# Patient Record
Sex: FEMALE | Race: WHITE | NOT HISPANIC OR LATINO | Employment: OTHER | URBAN - METROPOLITAN AREA
[De-identification: names, ages, dates, MRNs, and addresses within clinical notes are randomized per-mention and may not be internally consistent; named-entity substitution may affect disease eponyms.]

---

## 2017-05-03 ENCOUNTER — GENERIC CONVERSION - ENCOUNTER (OUTPATIENT)
Dept: OTHER | Facility: OTHER | Age: 62
End: 2017-05-03

## 2017-10-18 ENCOUNTER — ALLSCRIPTS OFFICE VISIT (OUTPATIENT)
Dept: OTHER | Facility: OTHER | Age: 62
End: 2017-10-18

## 2017-10-18 DIAGNOSIS — Z12.31 ENCOUNTER FOR SCREENING MAMMOGRAM FOR MALIGNANT NEOPLASM OF BREAST: ICD-10-CM

## 2017-10-18 DIAGNOSIS — Z00.00 ENCOUNTER FOR GENERAL ADULT MEDICAL EXAMINATION WITHOUT ABNORMAL FINDINGS: ICD-10-CM

## 2017-10-18 PROCEDURE — G0143 SCR C/V CYTO,THINLAYER,RESCR: HCPCS | Performed by: OBSTETRICS & GYNECOLOGY

## 2017-10-18 PROCEDURE — 88342 IMHCHEM/IMCYTCHM 1ST ANTB: CPT | Performed by: OBSTETRICS & GYNECOLOGY

## 2017-10-18 PROCEDURE — 88341 IMHCHEM/IMCYTCHM EA ADD ANTB: CPT | Performed by: OBSTETRICS & GYNECOLOGY

## 2017-10-18 PROCEDURE — 88305 TISSUE EXAM BY PATHOLOGIST: CPT | Performed by: OBSTETRICS & GYNECOLOGY

## 2017-10-18 PROCEDURE — 87624 HPV HI-RISK TYP POOLED RSLT: CPT | Performed by: OBSTETRICS & GYNECOLOGY

## 2017-10-19 ENCOUNTER — LAB REQUISITION (OUTPATIENT)
Dept: LAB | Facility: HOSPITAL | Age: 62
End: 2017-10-19
Payer: COMMERCIAL

## 2017-10-19 DIAGNOSIS — Z01.419 ENCOUNTER FOR GYNECOLOGICAL EXAMINATION WITHOUT ABNORMAL FINDING: ICD-10-CM

## 2017-10-19 DIAGNOSIS — R93.89 ABNORMAL FINDINGS ON DIAGNOSTIC IMAGING OF OTHER SPECIFIED BODY STRUCTURES: ICD-10-CM

## 2017-10-19 NOTE — PROGRESS NOTES
Plan  Encounter for gynecological examination without abnormal finding    · (1) THIN PREP PAP FOLLOW UP WITH IMAGING; Status: In Progress - Specimen/Data  Collected,Retrospective By Protocol Authorization;   Done: [id number]   Perform:[facility] In Office Collection; DBQ:[id number]; Last Updated By:Nadia Sanchez; 10/18/2017 11:16:34 AM;Ordered;For:Encounter for gynecological examination without abnormal finding; Ordered By:Julio C Robin;  Maturation index required? : No  :   HPV? : Regardless of Interpretation  Health Maintenance    · 1 Tara Brunner MD, [facility]  *  Status: Active -  Retrospective By Protocol Authorization  Requested for: [id number]   Ordered; For: Health Maintenance; Ordered By: Yany Collins Performed:  Due: [id number]; Last Updated By: Sadia Mahoney; 10/18/2017 11:19:26 AM  Care Summary provided  : Yes   · (1) HEP C ANTIBODY; Status:Active - Retrospective By Protocol Authorization; Requested  [facility]:[id number];    Perform:[facility]; Due:92Hov3871; Last Updated By:Nadia Sanchez; 10/18/2017 11:19:50 AM;Ordered;For:Health Maintenance; Ordered By:Octavia Robin;    Discussion/Summary  Discussion Summary: Thickened endometrial stripe with no postmenopausal bleeding  Patient has a colonoscopy pending  4 pass endometrial biopsy was performed  Short interval follow-up  Bilateral screening mammogram  Screening for hepatitis C  Chief Complaint  Chief Complaint Free Text Note Form: 58year old female here for a follow up appointment   History of Present Illness  HPI: Patient is a 59-year-old female with a one month history of left lower quadrant pain  Her primary care doctor suggested she go to the emergency room where CAT scan was performed and there was a finding of a thickened endometrial stripe at 1 3 cm and a 1 4 centimeter ovarian cyst  Taking Osphina  Diagnosis of the left lower quadrant pain was not made   She is scheduled for colonoscopy    She is here for a follow-up on her thickened endometrial stripe  Patient denies postmenopausal bleeding  Past medical history is significant for diabetes  Past surgical history is significant for breast reduction, transvaginal mesh, rotator cuff, and knee surgery  Current medications are Osphina  No known drug allergies  Family history significant for maternal grandmother with breast cancer  Review of Systems   Female ROS: no postmenopausal bleeding  Focused-Female:   Constitutional: not feeling poorly  Breasts: no complaints of breast pain, breast lump or nipple discharge  Gastrointestinal: abdominal pain  Active Problems  1  Encounter for gynecological examination without abnormal finding (V72 31) (Z01 419)   2  Encounter for mammogram to establish baseline mammogram (V76 12) (Z12 31)    Vitals  Vital Signs    Recorded: 86QQJ7057 65:98RF   Systolic 664, LUE, Sitting   Diastolic 82, LUE, Sitting   Height 5 ft 6 in   LMP      Physical Exam    Constitutional   General appearance: No acute distress, well appearing and well nourished  Neck   Neck: Normal, supple, trachea midline, no masses  Thyroid: Normal, no thyromegaly  Pulmonary   Respiratory effort: No increased work of breathing or signs of respiratory distress  Auscultation of lungs: Clear to auscultation  Cardiovascular   Auscultation of heart: Normal rate and rhythm, normal S1 and S2, no murmurs  Peripheral vascular exam: Normal pulses Throughout  Genitourinary   External genitalia: Normal and no lesions appreciated  Vagina: Normal, no lesions or dryness appreciated  Urethra: Normal     Urethral meatus: Normal     Bladder: Normal, soft, non-tender and no prolapse or masses appreciated  Cervix: Normal, no palpable masses  Uterus: Normal, non-tender, not enlarged, and no palpable masses  Adnexa/parametria: Normal, non-tender and no fullness or masses appreciated      Chest   Breasts: Normal and no dimpling or skin changes noted  -- Bilateral breast reduction scars  Abdomen   Abdomen: Normal, non-tender, and no organomegaly noted  Psychiatric   Orientation to person, place, and time: Normal     Mood and affect: Normal        Procedure    Procedure: Endometrial biopsy  Indication: abnormal ultrasound findings  Procedure Note:   The cervix was prepped with betadine  Anesthesia: none  The cervix allowed easy passage of a uterine sound without dilation  The uterus was anteverted-- and-- sounded to 9 cm  A Pipelle endometrial suction curette was inserted into the uterine cavity  Using a combination of suction and rotation, samples were obtained from  A moderate amount of blood and tissue were obtained  the specimen was placed in buffered formalin and sent for pathology  Patient Status: the patient tolerated the procedure well  Complications: there were no complications  The patient was instructed to follow up to review results  Future Appointments    Date/Time Provider Specialty Site   11/02/2017 11:20 AM KRYSTLE Butt   Obstetrics/Gynecology [facility]     Signatures   Electronically signed by : KRYSTLE Guzman ; Oct 18 2017 11:48AM EST                       (Author)

## 2017-10-21 ENCOUNTER — LAB CONVERSION - ENCOUNTER (OUTPATIENT)
Dept: OTHER | Facility: OTHER | Age: 62
End: 2017-10-21

## 2017-10-21 LAB
HEPATITIS C ANTIBODY (HISTORICAL): NORMAL
SIGNAL TO CUT-OFF (HISTORICAL): 0.89

## 2017-10-24 LAB — HPV RRNA GENITAL QL NAA+PROBE: NORMAL

## 2017-10-26 ENCOUNTER — GENERIC CONVERSION - ENCOUNTER (OUTPATIENT)
Dept: OTHER | Facility: OTHER | Age: 62
End: 2017-10-26

## 2017-10-26 PROCEDURE — 88305 TISSUE EXAM BY PATHOLOGIST: CPT | Performed by: OBSTETRICS & GYNECOLOGY

## 2017-10-28 LAB
LAB AP GYN PRIMARY INTERPRETATION: NORMAL
Lab: NORMAL

## 2017-10-30 ENCOUNTER — LAB REQUISITION (OUTPATIENT)
Dept: LAB | Facility: HOSPITAL | Age: 62
End: 2017-10-30
Payer: COMMERCIAL

## 2017-10-30 ENCOUNTER — GENERIC CONVERSION - ENCOUNTER (OUTPATIENT)
Dept: OTHER | Facility: OTHER | Age: 62
End: 2017-10-30

## 2017-10-30 DIAGNOSIS — Z00.00 ENCOUNTER FOR GENERAL ADULT MEDICAL EXAMINATION WITHOUT ABNORMAL FINDINGS: ICD-10-CM

## 2017-11-02 ENCOUNTER — GENERIC CONVERSION - ENCOUNTER (OUTPATIENT)
Dept: OTHER | Facility: OTHER | Age: 62
End: 2017-11-02

## 2017-11-20 RX ORDER — OMEPRAZOLE 20 MG/1
20 CAPSULE, DELAYED RELEASE ORAL DAILY
COMMUNITY

## 2017-11-20 RX ORDER — FENOFIBRATE 145 MG/1
145 TABLET, COATED ORAL DAILY
COMMUNITY

## 2017-11-20 NOTE — PRE-PROCEDURE INSTRUCTIONS
Pre-Surgery Instructions:   Medication Instructions    Diphenhydramine-APAP, sleep, (EXCEDRIN PM PO) Instructed patient per Anesthesia Guidelines   fenofibrate (TRICOR) 145 mg tablet Instructed patient per Anesthesia Guidelines   Multiple Vitamins-Minerals (EYE VITAMINS PO) Instructed patient per Anesthesia Guidelines   omeprazole (PriLOSEC) 20 mg delayed release capsule Instructed patient per Anesthesia Guidelines   Ospemifene (OSPHENA) 60 MG TABS Instructed patient per Anesthesia Guidelines  Pre procedure instructions given,verbalizes understanding

## 2017-11-30 ENCOUNTER — ANESTHESIA EVENT (OUTPATIENT)
Dept: PERIOP | Facility: HOSPITAL | Age: 62
End: 2017-11-30
Payer: COMMERCIAL

## 2017-11-30 NOTE — H&P
H&P Exam - Gynecology   Mp Mace 58 y o  female MRN: [id number]  Unit/Bed#:  Encounter: [id number]    Assessment/Plan     Assessment:  19-year-old Female with a thickened endometrial stripe  Plan:  Hysteroscopy, D&C    History of Present Illness    57 YO  female with an incidental finding of a thickened endometrial stripe  Patient has been on [address] [place] [address]  This thickened endometrium was discovered because the patient was having left lower quadrant pain  Patient denies history of postmenopausal bleeding  Endometrial biopsy showed focal cytologic atypia  Review of Systems   Respiratory: Negative for shortness of breath  Cardiovascular: Negative for chest pain  Historical Information   Past Medical History:   Diagnosis Date    CPAP (continuous positive airway pressure) dependence     does not use machine    GERD (gastroesophageal reflux disease)     History of transfusion     Hyperlipidemia     Sleep apnea      Past Surgical History:   Procedure Laterality Date    COLONOSCOPY      JOINT REPLACEMENT Bilateral     ROTATOR CUFF REPAIR Left     TUBAL LIGATION       OB/GYN History: Incidental finding of thickened endometrial stripe, uterus sounded to 9 cm  , Pap smear normal  HPV negative  History reviewed  No pertinent family history  Social History   History   Alcohol Use No     History   Drug Use No     History   Smoking Status    Never Smoker   Smokeless Tobacco    Never Used       Meds/Allergies   all current active meds have been reviewed  No Known Allergies    Objective   Vitals: Height 5' 8" (1 727 m), weight 111 kg (244 lb)  No intake or output data in the 24 hours ending 11/30/17 0902    Invasive Devices: Invasive Devices          No matching active lines, drains, or airways          Physical Exam   Chest: Clear to auscultation  Heart: Regular rhythm no murmurs  Abdomen: Soft, nontender  Pelvic: Anteverted mobile uterus, no adnexal masses      Lab Results: I have personally reviewed pertinent reports  Imaging: I have personally reviewed pertinent reports  EKG, Pathology, and Other Studies: I have personally reviewed pertinent reports  Code Status: No Order  Advance Directive and Living Will:      Power of :    POLST:      Counseling / Coordination of Care  Total floor / unit time spent today 20 minutes  Greater than 50% of total time was spent with the patient and / or family counseling and / or coordination of care  A description of the counseling / coordination of care: Thickened endometrial stripe

## 2017-12-01 ENCOUNTER — HOSPITAL ENCOUNTER (OUTPATIENT)
Facility: HOSPITAL | Age: 62
Setting detail: OUTPATIENT SURGERY
Discharge: HOME | End: 2017-12-01
Attending: OBSTETRICS & GYNECOLOGY | Admitting: OBSTETRICS & GYNECOLOGY
Payer: COMMERCIAL

## 2017-12-01 ENCOUNTER — ANESTHESIA (OUTPATIENT)
Dept: PERIOP | Facility: HOSPITAL | Age: 62
End: 2017-12-01
Payer: COMMERCIAL

## 2017-12-01 VITALS
HEART RATE: 76 BPM | OXYGEN SATURATION: 96 % | HEIGHT: 68 IN | DIASTOLIC BLOOD PRESSURE: 77 MMHG | WEIGHT: 242 LBS | TEMPERATURE: 96.5 F | RESPIRATION RATE: 18 BRPM | SYSTOLIC BLOOD PRESSURE: 130 MMHG | BODY MASS INDEX: 36.68 KG/M2

## 2017-12-01 DIAGNOSIS — R93.89 THICKENED ENDOMETRIUM: ICD-10-CM

## 2017-12-01 PROBLEM — N84.0 UTERINE POLYP: Status: ACTIVE | Noted: 2017-12-01

## 2017-12-01 PROBLEM — Z78.0 POSTMENOPAUSAL: Status: ACTIVE | Noted: 2017-12-01

## 2017-12-01 LAB
ALBUMIN SERPL BCP-MCNC: 4 G/DL (ref 3.5–5)
ALP SERPL-CCNC: 61 U/L (ref 46–116)
ALT SERPL W P-5'-P-CCNC: 21 U/L (ref 12–78)
ANION GAP SERPL CALCULATED.3IONS-SCNC: 5 MMOL/L (ref 4–13)
AST SERPL W P-5'-P-CCNC: 16 U/L (ref 5–45)
ATRIAL RATE: 74 BPM
BILIRUB SERPL-MCNC: 0.35 MG/DL (ref 0.2–1)
BUN SERPL-MCNC: 15 MG/DL (ref 5–25)
CALCIUM SERPL-MCNC: 9.3 MG/DL (ref 8.3–10.1)
CHLORIDE SERPL-SCNC: 106 MMOL/L (ref 100–108)
CO2 SERPL-SCNC: 29 MMOL/L (ref 21–32)
CREAT SERPL-MCNC: 0.77 MG/DL (ref 0.6–1.3)
GFR SERPL CREATININE-BSD FRML MDRD: 83 ML/MIN/1.73SQ M
GLUCOSE P FAST SERPL-MCNC: 102 MG/DL (ref 65–99)
GLUCOSE SERPL-MCNC: 102 MG/DL (ref 65–140)
P AXIS: 39 DEGREES
POTASSIUM SERPL-SCNC: 4.2 MMOL/L (ref 3.5–5.3)
PR INTERVAL: 146 MS
PROT SERPL-MCNC: 8.4 G/DL (ref 6.4–8.2)
QRS AXIS: -3 DEGREES
QRSD INTERVAL: 96 MS
QT INTERVAL: 420 MS
QTC INTERVAL: 466 MS
SODIUM SERPL-SCNC: 140 MMOL/L (ref 136–145)
T WAVE AXIS: 58 DEGREES
VENTRICULAR RATE: 74 BPM

## 2017-12-01 PROCEDURE — 80053 COMPREHEN METABOLIC PANEL: CPT | Performed by: OBSTETRICS & GYNECOLOGY

## 2017-12-01 PROCEDURE — 88305 TISSUE EXAM BY PATHOLOGIST: CPT | Performed by: OBSTETRICS & GYNECOLOGY

## 2017-12-01 PROCEDURE — 93005 ELECTROCARDIOGRAM TRACING: CPT | Performed by: OBSTETRICS & GYNECOLOGY

## 2017-12-01 RX ORDER — ONDANSETRON 2 MG/ML
4 INJECTION INTRAMUSCULAR; INTRAVENOUS ONCE AS NEEDED
Status: DISCONTINUED | OUTPATIENT
Start: 2017-12-01 | End: 2017-12-01 | Stop reason: HOSPADM

## 2017-12-01 RX ORDER — MEPERIDINE HYDROCHLORIDE 25 MG/ML
12.5 INJECTION INTRAMUSCULAR; INTRAVENOUS; SUBCUTANEOUS ONCE AS NEEDED
Status: DISCONTINUED | OUTPATIENT
Start: 2017-12-01 | End: 2017-12-01 | Stop reason: HOSPADM

## 2017-12-01 RX ORDER — FENTANYL CITRATE/PF 50 MCG/ML
50 SYRINGE (ML) INJECTION
Status: DISCONTINUED | OUTPATIENT
Start: 2017-12-01 | End: 2017-12-01 | Stop reason: HOSPADM

## 2017-12-01 RX ORDER — LIDOCAINE HYDROCHLORIDE 10 MG/ML
INJECTION, SOLUTION INFILTRATION; PERINEURAL AS NEEDED
Status: DISCONTINUED | OUTPATIENT
Start: 2017-12-01 | End: 2017-12-01 | Stop reason: SURG

## 2017-12-01 RX ORDER — PROPOFOL 10 MG/ML
INJECTION, EMULSION INTRAVENOUS AS NEEDED
Status: DISCONTINUED | OUTPATIENT
Start: 2017-12-01 | End: 2017-12-01 | Stop reason: SURG

## 2017-12-01 RX ORDER — ONDANSETRON 2 MG/ML
INJECTION INTRAMUSCULAR; INTRAVENOUS AS NEEDED
Status: DISCONTINUED | OUTPATIENT
Start: 2017-12-01 | End: 2017-12-01 | Stop reason: SURG

## 2017-12-01 RX ORDER — SODIUM CHLORIDE, SODIUM LACTATE, POTASSIUM CHLORIDE, CALCIUM CHLORIDE 600; 310; 30; 20 MG/100ML; MG/100ML; MG/100ML; MG/100ML
50 INJECTION, SOLUTION INTRAVENOUS CONTINUOUS
Status: DISCONTINUED | OUTPATIENT
Start: 2017-12-01 | End: 2017-12-01 | Stop reason: HOSPADM

## 2017-12-01 RX ORDER — METOCLOPRAMIDE HYDROCHLORIDE 5 MG/ML
10 INJECTION INTRAMUSCULAR; INTRAVENOUS ONCE AS NEEDED
Status: DISCONTINUED | OUTPATIENT
Start: 2017-12-01 | End: 2017-12-01 | Stop reason: HOSPADM

## 2017-12-01 RX ORDER — KETOROLAC TROMETHAMINE 30 MG/ML
INJECTION, SOLUTION INTRAMUSCULAR; INTRAVENOUS AS NEEDED
Status: DISCONTINUED | OUTPATIENT
Start: 2017-12-01 | End: 2017-12-01 | Stop reason: SURG

## 2017-12-01 RX ORDER — IBUPROFEN 600 MG/1
600 TABLET ORAL EVERY 6 HOURS PRN
Status: DISCONTINUED | OUTPATIENT
Start: 2017-12-01 | End: 2017-12-01 | Stop reason: HOSPADM

## 2017-12-01 RX ORDER — IBUPROFEN 600 MG/1
600 TABLET ORAL EVERY 6 HOURS PRN
Qty: 30 TABLET | Refills: 0 | Status: CANCELLED
Start: 2017-12-01

## 2017-12-01 RX ORDER — SODIUM CHLORIDE, SODIUM LACTATE, POTASSIUM CHLORIDE, CALCIUM CHLORIDE 600; 310; 30; 20 MG/100ML; MG/100ML; MG/100ML; MG/100ML
125 INJECTION, SOLUTION INTRAVENOUS CONTINUOUS
Status: DISCONTINUED | OUTPATIENT
Start: 2017-12-01 | End: 2017-12-01 | Stop reason: HOSPADM

## 2017-12-01 RX ORDER — MAGNESIUM HYDROXIDE 1200 MG/15ML
LIQUID ORAL AS NEEDED
Status: DISCONTINUED | OUTPATIENT
Start: 2017-12-01 | End: 2017-12-01 | Stop reason: HOSPADM

## 2017-12-01 RX ADMIN — IBUPROFEN 600 MG: 600 TABLET, FILM COATED ORAL at 10:56

## 2017-12-01 RX ADMIN — SODIUM CHLORIDE, SODIUM LACTATE, POTASSIUM CHLORIDE, AND CALCIUM CHLORIDE: .6; .31; .03; .02 INJECTION, SOLUTION INTRAVENOUS at 08:58

## 2017-12-01 RX ADMIN — FENTANYL CITRATE 50 MCG: 50 INJECTION INTRAMUSCULAR; INTRAVENOUS at 10:12

## 2017-12-01 RX ADMIN — ONDANSETRON 4 MG: 2 INJECTION INTRAMUSCULAR; INTRAVENOUS at 09:14

## 2017-12-01 RX ADMIN — SODIUM CHLORIDE, SODIUM LACTATE, POTASSIUM CHLORIDE, AND CALCIUM CHLORIDE 125 ML/HR: .6; .31; .03; .02 INJECTION, SOLUTION INTRAVENOUS at 08:41

## 2017-12-01 RX ADMIN — DEXAMETHASONE SODIUM PHOSPHATE 10 MG: 10 INJECTION INTRAMUSCULAR; INTRAVENOUS at 09:14

## 2017-12-01 RX ADMIN — KETOROLAC TROMETHAMINE 30 MG: 30 INJECTION, SOLUTION INTRAMUSCULAR at 09:30

## 2017-12-01 RX ADMIN — LIDOCAINE HYDROCHLORIDE 50 MG: 10 INJECTION, SOLUTION INFILTRATION; PERINEURAL at 09:03

## 2017-12-01 RX ADMIN — PROPOFOL 250 MG: 10 INJECTION, EMULSION INTRAVENOUS at 09:03

## 2017-12-01 NOTE — DISCHARGE INSTRUCTIONS
Hysteroscopy   WHAT YOU SHOULD KNOW:   A hysteroscopy is a procedure to look at the inside of your uterus  Other procedures may also be done during the hysteroscopy  AFTER YOU LEAVE:   Medicines:   · Acetaminophen  decreases pain  It is available without a doctor's order  Ask how much to take and how often to take it  Follow directions  Acetaminophen can cause liver damage if not taken correctly  · NSAIDs  help decrease swelling and pain  This medicine is available with or without a doctor's order  NSAIDs can cause stomach bleeding or kidney problems in certain people  If you take blood thinner medicine, always ask your primary healthcare provider (PHP) if NSAIDs are safe for you  Always read the medicine label and follow directions  · Take your medicine as directed  Contact your PHP if you think your medicine is not helping or if you have side effects  Tell him if you are allergic to any medicine  Keep a list of the medicines, vitamins, and herbs you take  Include the amounts, and when and why you take them  Bring the list or the pill bottles to follow-up visits  Carry your medicine list with you in case of an emergency  Follow up with your PHP or gynecologist as directed:  Write down your questions so you remember to ask them during your visits  Activity guidelines: You may feel like resting more after the procedure  Slowly start to do more each day  Rest when you feel it is needed  Ask your PHP when you can return to your daily activities  Self-care:   · Do not put anything into your vagina until your PHP says it is okay  Do not have sex, douche, or use tampons  · You may need to continue to wear a sanitary pad for up to a week  You may have light bleeding or spotting  Contact your PHP if:   · You have a fever  · You have to change your sanitary pad every hour  · You have chills, a cough, or feel weak and achy  · You have abdominal pain that does not go away      · You have abnormal or foul-smelling vaginal discharge  · Your skin is itchy, swollen, or you have a rash  · You have questions or concerns about your condition or care  © 2014 [address] is for End User's use only and may not be sold, redistributed or otherwise used for commercial purposes  All illustrations and images included in CareNotes® are the copyrighted property of A D A M , Inc  or Leonel Wang  The above information is an  only  It is not intended as medical advice for individual conditions or treatments  Talk to your doctor, nurse or pharmacist before following any medical regimen to see if it is safe and effective for you

## 2017-12-01 NOTE — OP NOTE
OPERATIVE REPORT  PATIENT NAME: Presley Boggs    :  1955  MRN: [id number]  Pt Location: BE OR ROOM 03    SURGERY DATE: 2017    Surgeon(s) and Role:     * Trudy Vargas MD - Primary     * Alex Noel MD - Assisting    Preop Diagnosis: Thickened endometrium [R93 8]    Post-Op Diagnosis Codes: * Thickened endometrium [r93 8]  Uterine polyp x2    Procedure(s) (LRB):  DILATATION AND CURETTAGE (D&C) WITH HYSTEROSCOPY; POLYPECTOMY (N/A)    Specimen(s):  ID Type Source Tests Collected by Time Destination   1 : emc with polyp Tissue Endometrium TISSUE EXAM Trudy Vargas MD 2017 0381        Estimated Blood Loss:   Minimal    Drains:  None     Anesthesia Type:   General LMA    Operative Indications: Thickened endometrium [R93 8]  Post menopausal bleeidng    Operative Findings:  1  Grossly normal size uterus with no adnexal masses palpated of by bimanual exam  2  Atrophic endometrium  3  Two 1 cm polyps at fundus and 1 cm away from fundus at 9 o'clock obliterating right ostia  4   Left ostia visualized and patent    Complications:   None    Procedure and Technique:  Brief History  58-year-old postmenopausal female for hysteroscopy D & C for a finding of thickened endometrium and on Osphena  All risks, benefits, and alternatives to the procedure were discussed with the patient and she had the opportunity to ask questions  Informed consent was obtained  Description of Procedure    Patient was taken to the operating room were a time out was performed to confirm correct patient and correct procedure  General LMA anesthesia (LMA) was administered and the patient was positioned on the OR table in the dorsal lithotomy position  All pressure points were padded and a jonatan hugger was placed to maintain control of core body temperature   A bimanual exam was performed and the uterus was noted to be midposition, normal in size and consistency with no palpable adnexal masses or fullness  The patient was vaginally prepped with Betadine and draped in the usual sterile fashion  Operative Technique    A Forbes retractor posteriorly and Divide retractor anteriorly was used to visualize the anterior lip of the cervix, which was then grasped with a single toothed tenaculum  The cervix was serially dilated to using Briggs dilators to allow size of hysteroscope  Hysteroscope was introduced under direct visualization using normal saline solution as the distention media  Hysteroscope was advanced to the uterine fundus and the entire uterine cavity was inspected in a systematic manner  There was noted to be a patent left ostia, atrophic endometrium, and two 1 cm polyps noted at the fundus at 9 o'clock  Hysteroscope was withdrawn and sharp curetting was performed, starting at the 12'oclock position and rotating a total of 360 degrees to cover all surfaces  Endometrial tissue and polyps were obtained and sent for pathology  The single toothed tenaculum was removed from the anterior lip of the cervix  Good hemostasis was confirmed at the tenaculum puncture sites  Weighted speculum was then removed from the vagina  At the conclusion of the procedure, all needle, sponge, and instrument counts were noted to be correct x2  Patient tolerated the procedure well and was transferred to PACU in stable condition prior to discharge with follow up in 1-2 weeks  Dr Maciel Culver was present and participated in all key portions of the case      Patient Disposition:  extubated and stable in PACU    SIGNATURE: Loretta Ang MD  DATE: December 1, 2017  TIME: 9:40 AM

## 2017-12-20 ENCOUNTER — GENERIC CONVERSION - ENCOUNTER (OUTPATIENT)
Dept: OTHER | Facility: OTHER | Age: 62
End: 2017-12-20

## 2018-01-11 ENCOUNTER — ALLSCRIPTS OFFICE VISIT (OUTPATIENT)
Dept: OTHER | Facility: OTHER | Age: 63
End: 2018-01-11

## 2018-01-11 ENCOUNTER — GENERIC CONVERSION - ENCOUNTER (OUTPATIENT)
Dept: OTHER | Facility: OTHER | Age: 63
End: 2018-01-11

## 2018-01-13 NOTE — CONSULTS
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 who was found to have thickened endometrium by imaging  She underwent multiple endometrial biopsies and ultimately a D&C in December 2017  initial endometrial biopsy in October of 2017 revealed focal cytologic atypia with aberrant P 53 staining in the foci of cytologic atypia  Subsequent evaluations were benign  Her performance status is 0  Plan   Atypical endometrial hyperplasia    · Schedule Surgery Treatment  Procedure  Status: Hold For - Scheduling  Requested for:    50IYP7512   Ordered; For: Atypical endometrial hyperplasia; Ordered By: Sandoval Pugh Performed:  Due: 89VZG8360         1  I discussed the risks and benefits of robotic assisted total laparoscopic hysterectomy, bilateral salpingo-oophorectomy, possible lymph node dissection and staging, possible exploratory laparotomy and all other indicated procedures  She understands the risks and benefits of the operation and agrees to proceed  Consent was obtained by me in the office  She understands that the best possible outcome would be no evidence of malignancy or hyperplasia in the endometrium but that the initial endometrial biopsy was concerning enough for underlying malignancy that hysterectomy is warranted  2   Stop osphena  Will reconsider hormone replacement therapy once final pathology is available  I discussed barrier protection on the vulva to reduce irritation from incontinence  Thank you for the courtesy of this consultation  All questions were answered by the end of the visit  Chief Complaint   Chief Complaint Free Text Note Form: Endometrial biopsy with atypical endometrial cells, aberrant P 53 staining      History of Present Illness   Problem [place]:    Endometrial biopsy with atypical endometrial cells, aberrant P 53 staining      Previous Therapy: 1  endometrial biopsy performed on 10/18/2017 for thickened endometrium revealing foci of cytologic atypia with aberrant P 53 staining  endometrial biopsy 10/26/2017-benign endometrium with metaplastic features no hyperplasia or malignancy D and C on 2017 with endometrial polyp, no carcinoma or hyperplasia  Free Text HPI: 79-year-old with obesity, BMI of 38 kilograms/meter squared who was found incidentally to have thickened endometrium on a CT scan performed for left lower quadrant pain  She then underwent 2 endometrial biopsies and a D and C as the 1st endometrial biopsy revealed atypical cells were with aberrant P 53 staining  She does not have postmenopausal bleeding or pelvic pain  She has been taking osphena for vulvodynia  She is referred as a consultation by Dr Jose Sánchez to discuss surgical treatment options for her initial endometrial biopsy demonstrating atypical cells  Review of Systems   Complete-Female Gyn Onc:      Eyes: dryness of the eyes  ENT: nosebleeds      Genitourinary: incontinence  Psychiatric: sleep disturbances  Endocrine: hot flashes  Other Symptoms: The remainder of the 14 point review of systems is negative  ROS Reviewed:    ROS reviewed  Active Problems   1  Anxiety (300 00) (F41 9)   2  Encounter for gynecological examination without abnormal finding (V72 31) (Z01 419)   3  Encounter for mammogram to establish baseline mammogram (V76 12) (Z12 31)   4  Thickened endometrium (793 5) (R93 8)   5  Vaginal burning (625 8) (N94 9)    Past Medical History   1  History of H/O colonoscopy (V45 89) (W83 891)   2  History of mammogram (V15 89) (Z92 89)  GYN Onc Past GYN History [facility]:      Patient GYN History:  2,-- Para 2,-- menopause at age 61-- and-- currently taking osphena for hormone replacement therapy, but-- no abnormal pap smears in the past-- and-- no history of STD(s)      Active Problems And Past Medical History Reviewed: The active problems and past medical history were reviewed and updated today  Surgical History   1  History of Breast Surgery Reduction Procedure   2  History of Cataract Surgery   3  History of Knee Replacement   4  History of Rotator Cuff Repair   5  History of Tubal Ligation   6  History of Vaginal Surg Insertion Of Mesh For Pelvic Floor Repair  Surgical History Reviewed: The surgical history was reviewed and updated today  Family History   Maternal Grandmother    1  Family history of malignant neoplasm of breast (V16 3) (Z80 3)  Maternal Uncle    2  Family history of malignant neoplasm of prostate (V16 42) (Z80 45)  Family History Reviewed: The family history was reviewed and updated today  Social History    · Never smoked   · No alcohol use   · Retired  Social History Reviewed: The social history was reviewed and updated today  Current Meds    1  Osphena 60 MG Oral Tablet; Take 1 tablet daily; Therapy: (Recorded:11Jan2018) to Recorded   2  Tricor 145 MG Oral Tablet; Therapy: (Recorded:11Jan2018) to Recorded   3  Zolpidem Tartrate 5 MG Oral Tablet; Take 1 tablet twice daily; Therapy: (Recorded:11Jan2018) to Recorded  Medication List Reviewed: The medication list was reviewed and updated today  Allergies   1  No Known Drug Allergies  2  No Known Environmental Allergies   3  No Known Food Allergies    Vitals   Vital Signs    Recorded: 94IXZ8516 10:55AM   Temperature 98 3 F, Oral   Heart Rate 82, R Radial   Pulse Quality Normal, R Radial   Respiration Quality Normal   Respiration 16   Systolic 272, LUE, Sitting   Diastolic 78, LUE, Sitting   Height 5 ft 6 5 in   Weight 238 lb 5 oz   BMI Calculated 37 89   BSA Calculated 2 17     Physical Exam        Constitutional      General appearance: No acute distress, well appearing and well nourished  Neck      Neck: Normal, supple, trachea midline, no masses  Thyroid: Normal, no thyromegaly         Pulmonary Respiratory effort: No increased work of breathing or signs of respiratory distress  Auscultation of lungs: Clear to auscultation  Cardiovascular      Auscultation of heart: Normal rate and rhythm, normal S1 and S2, no murmurs  Peripheral vascular exam: Normal pulses throughout  No edema noted in lower extremities  Genitourinary      External genitalia: Normal and no lesions appreciated  Vagina: Normal, no lesions or dryness appreciated  Urethra: Normal        Urethral meatus: Normal        Bladder: Normal, soft, non-tender and no prolapse or masses appreciated  Cervix: Normal, no palpable masses  Uterus: Abnormal  -- Nine weeks in size  Mobile  Adnexa/parametria: Normal, non-tender and no fullness or masses appreciated  Abdomen      Abdomen: Normal, soft, non-tender, and no organomegaly noted  Liver and spleen: No hepatomegaly or splenomegaly  Examination for hernias: No hernias appreciated  Lymphatic      Palpation of lymph nodes in neck, axillae, groin and/or other locations: No lymphadenopathy or masses noted  Skin      Skin and subcutaneous tissue: Normal skin turgor and no rashes         Palpation of skin and subcutaneous tissue: Normal        Psychiatric      Orientation to person, place, and time: Normal        Mood and affect: Normal        GOG performance status is: 0      Signatures    Electronically signed by : [place] MD; Jan 11 2018  7:28PM EST                       (Author)

## 2018-01-14 VITALS — DIASTOLIC BLOOD PRESSURE: 82 MMHG | HEIGHT: 66 IN | SYSTOLIC BLOOD PRESSURE: 138 MMHG

## 2018-01-15 ENCOUNTER — GENERIC CONVERSION - ENCOUNTER (OUTPATIENT)
Dept: OTHER | Facility: OTHER | Age: 63
End: 2018-01-15

## 2018-01-15 DIAGNOSIS — N85.02 ENDOMETRIAL INTRAEPITHELIAL NEOPLASIA (EIN): ICD-10-CM

## 2018-01-18 ENCOUNTER — TRANSCRIBE ORDERS (OUTPATIENT)
Dept: LAB | Facility: CLINIC | Age: 63
End: 2018-01-18

## 2018-01-18 ENCOUNTER — OFFICE VISIT (OUTPATIENT)
Dept: LAB | Facility: CLINIC | Age: 63
End: 2018-01-18
Payer: COMMERCIAL

## 2018-01-18 ENCOUNTER — GENERIC CONVERSION - ENCOUNTER (OUTPATIENT)
Dept: GYNECOLOGIC ONCOLOGY | Facility: CLINIC | Age: 63
End: 2018-01-18

## 2018-01-18 ENCOUNTER — ANESTHESIA EVENT (OUTPATIENT)
Dept: PERIOP | Facility: HOSPITAL | Age: 63
End: 2018-01-18
Payer: COMMERCIAL

## 2018-01-18 ENCOUNTER — APPOINTMENT (OUTPATIENT)
Dept: RADIOLOGY | Facility: CLINIC | Age: 63
End: 2018-01-18
Payer: COMMERCIAL

## 2018-01-18 DIAGNOSIS — N85.02 ENDOMETRIAL INTRAEPITHELIAL NEOPLASIA (EIN): ICD-10-CM

## 2018-01-18 DIAGNOSIS — N85.02 ENDOMETRIAL HYPERPLASIA WITH ATYPIA: ICD-10-CM

## 2018-01-18 DIAGNOSIS — N85.02 ENDOMETRIAL HYPERPLASIA WITH ATYPIA: Primary | ICD-10-CM

## 2018-01-18 PROCEDURE — 71046 X-RAY EXAM CHEST 2 VIEWS: CPT

## 2018-01-18 PROCEDURE — 93005 ELECTROCARDIOGRAM TRACING: CPT

## 2018-01-18 NOTE — PRE-PROCEDURE INSTRUCTIONS
Pre-Surgery Instructions:   Medication Instructions    DiphenhydrAMINE HCl, Sleep, (UNISOM SLEEPGELS PO) Instructed patient per Anesthesia Guidelines   fenofibrate (TRICOR) 145 mg tablet Instructed patient per Anesthesia Guidelines   LUTEIN PO Instructed patient per Anesthesia Guidelines   omeprazole (PriLOSEC) 20 mg delayed release capsule Instructed patient per Anesthesia Guidelines

## 2018-01-18 NOTE — PROGRESS NOTES
Preliminary Nursing Report                Patient Information    Initial Encounter Entry Date:   10/30/2017 1:47 PM EST (Automated Transmission Automated Transmission)       Last Modified:   {MoisesH  ModifiedOn}              Legal Name: Isacc Boles        Social Security Number:        YOB: 1955        Age (years): 58        Gender: F        Body Mass Index (BMI): 39 kg/m2        Height: 66 in  Weight: 244 lbs (111 kgs)           Address:   [address] [place] [id number]               Phone: [phone]   (consent to leave messages)        Email:        Ethnicity: Decline to State        Confucianism:        Marital Status:        Preferred Language: English        Race: Other Race                    Patient Insurance Information        Primary Insurance Information Carrier Name: {Primary  CarrierName}           Carrier Address:   {Primary  CarrierAddress}              Carrier Phone: {Primary  CarrierPhone}          Group Number: {Primary  GroupNumber}          Policy Number: {Primary  PolicyNumber}          Insured Name: {Primary  InsuredName}          Insured : {Primary  InsuredDOB}          Relationship to Insured: {Primary  RelationshiptoInsured}           Secondary Insurance Information Carrier Name: {Secondary  CarrierName}           Carrier Address:   {Secondary  CarrierAddress}              Carrier Phone: {Secondary  CarrierPhone}          Group Number: {Secondary  GroupNumber}          Policy Number: {Secondary  PolicyNumber}          Insured Name: {Secondary  InsuredName}          Insured : {Secondary  InsuredDOB}          Relationship to Insured: {Secondary  RelationshiptoInsured}                       Health Profile   Booking #:   Reginold Clink #: [phone]               DOS: 2017    Surgery : HYSTEROSCOPY BIOPSY    Add'l Procedures/Notes:     Surgery Risk: Minor          Precautions     BMI                   Allergies    Not Known       Clinical Comments: Reaction Type: , Reaction: , Severity:              Medications    None               Conditions    Encounter for gynecological examination without abnormal finding       Encounter for mammogram to establish baseline mammogram       Thickened endometrium               Family History    None             Surgical History    None             Social History    None                               Patient Instructions       Medical Procedure Risk  NPO Instructions   The day before surgery it is recommended to have a light dinner at your usual time and you are allowed a light snack early in the evening  Do not eat anything heavy or eat a big meal after 7pm  Do not eat or drink anything after midnight prior to your surgery  If you are supposed to take any of your medications, do so with a sip of water  Failure to follow these instructions can lead to an increased risk of lung complications and may result in a delay or cancellation of your procedure  If you have any questions, contact your institution for further instructions  No candy, no gum, no mints, no chewing tobacco                Testing Considerations       ? Pregnancy Test t  Consider a urine pregnancy test on the morning of surgery  Triggered by: Medical Procedure               Consultations       No recommendations for this classification  Miscellaneous Questions         Question: Are you able to walk up a flight of stairs, walk up a hill or do heavy housework WITHOUT having chest pain or shortness of breath? Answer: YES                   Allergies/Conditions/Medications Not Found        The following were not recognized by our system when generating the recommendations  Please consider if this would impact any preoperative protocols  ?  Encounter for mammogram to establish baseline mammogram                  Appointment Information         Date:    12/01/2017        Location:    [facility]        Address:           Directions: Footnotes revision 14      ?? Denotes a free-text entry  Legal Disclaimer: Any and all recommendations and services provided herein are designed to assist in the preoperative care of the patient  Nothing contained herein is designed to replace, eliminate or alleviate the responsibility of the attending physician to supervise and determine the patient?s preoperative care and course of treatment  Failure to provide complete, accurate information may negatively impact the system?s ability to recommend the proper preoperative protocol  THE ATTENDING PHYSICIAN IS RESPONSIBLE TO REVIEW THE SUGGESTED PREOPERATIVE PROTOCOLS/COURSE OF TREATMENT AND PRESCRIBE THE FINAL COURSE OF PREOPERATIVE TREATMENT IN CONSULTATION WITH THE PATIENT  THE ePREOP SYSTEM AND ITS MATERIALS ARE PROVIDED ? AS IS? WITHOUT WARRANTY OF ANY KIND, EXPRESS OR IMPLIED, INCLUDING, BUT NOT LIMITED TO, WARRANTIES OF PERFORMANCE OR MERCHANTABILITY OR FITNESS FOR A PARTICULAR PURPOSE  PATIENT AND PHYSICIANS HEREBY AGREE THAT THEIR USE OF THE MATERIALS AND RESOURCES ACT AS A CONSENT TO RELEASE AND WAIVE ePREOP FROM ANY AND ALL CLAIMS OF WARRANTY, TORT OR CONTRACT LAW OF ANY KIND

## 2018-01-19 ENCOUNTER — LAB CONVERSION - ENCOUNTER (OUTPATIENT)
Dept: OTHER | Facility: OTHER | Age: 63
End: 2018-01-19

## 2018-01-19 LAB
A/G RATIO (HISTORICAL): 1.3 (CALC) (ref 1–2.5)
AB SCRN, RBC W/RFLX ID,TITER,AG (HISTORICAL): NORMAL
ALBUMIN SERPL BCP-MCNC: 4.3 G/DL (ref 3.6–5.1)
ALP SERPL-CCNC: 54 U/L (ref 33–130)
ALT SERPL W P-5'-P-CCNC: 12 U/L (ref 6–29)
APTT PPP: 25 SEC (ref 22–34)
AST SERPL W P-5'-P-CCNC: 15 U/L (ref 10–35)
ATRIAL RATE: 73 BPM
BASOPHILS # BLD AUTO: 0.9 %
BASOPHILS # BLD AUTO: 84 CELLS/UL (ref 0–200)
BILIRUB SERPL-MCNC: 0.3 MG/DL (ref 0.2–1.2)
BUN SERPL-MCNC: 13 MG/DL (ref 7–25)
BUN/CREA RATIO (HISTORICAL): ABNORMAL (CALC) (ref 6–22)
CALCIUM SERPL-MCNC: 9.7 MG/DL (ref 8.6–10.4)
CHLORIDE SERPL-SCNC: 106 MMOL/L (ref 98–110)
CO2 SERPL-SCNC: 28 MMOL/L (ref 20–31)
CREAT SERPL-MCNC: 0.78 MG/DL (ref 0.5–0.99)
DEPRECATED RDW RBC AUTO: 13.1 % (ref 11–15)
EGFR AFRICAN AMERICAN (HISTORICAL): 94 ML/MIN/1.73M2
EGFR-AMERICAN CALC (HISTORICAL): 81 ML/MIN/1.73M2
EOSINOPHIL # BLD AUTO: 2.3 %
EOSINOPHIL # BLD AUTO: 214 CELLS/UL (ref 15–500)
GAMMA GLOBULIN (HISTORICAL): 3.2 G/DL (CALC) (ref 1.9–3.7)
GLUCOSE (HISTORICAL): 106 MG/DL (ref 65–99)
HBA1C MFR BLD HPLC: 6 % OF TOTAL HGB
HCT VFR BLD AUTO: 39.3 % (ref 35–45)
HGB BLD-MCNC: 12.5 G/DL (ref 11.7–15.5)
INR PPP: 1
LYMPHOCYTES # BLD AUTO: 2027 CELLS/UL (ref 850–3900)
LYMPHOCYTES # BLD AUTO: 21.8 %
MCH RBC QN AUTO: 28.1 PG (ref 27–33)
MCHC RBC AUTO-ENTMCNC: 31.8 G/DL (ref 32–36)
MCV RBC AUTO: 88.3 FL (ref 80–100)
MONOCYTES # BLD AUTO: 763 CELLS/UL (ref 200–950)
MONOCYTES (HISTORICAL): 8.2 %
NEUTROPHILS # BLD AUTO: 6212 CELLS/UL (ref 1500–7800)
NEUTROPHILS # BLD AUTO: 66.8 %
P AXIS: 29 DEGREES
PLATELET # BLD AUTO: 357 THOUSAND/UL (ref 140–400)
PMV BLD AUTO: 10.8 FL (ref 7.5–12.5)
POTASSIUM SERPL-SCNC: 4.1 MMOL/L (ref 3.5–5.3)
PR INTERVAL: 150 MS
PROTHROMBIN TIME: 10.4 SEC (ref 9–11.5)
QRS AXIS: 0 DEGREES
QRSD INTERVAL: 90 MS
QT INTERVAL: 402 MS
QTC INTERVAL: 442 MS
RBC # BLD AUTO: 4.45 MILLION/UL (ref 3.8–5.1)
SODIUM SERPL-SCNC: 142 MMOL/L (ref 135–146)
T WAVE AXIS: 43 DEGREES
TOTAL PROTEIN (HISTORICAL): 7.5 G/DL (ref 6.1–8.1)
VENTRICULAR RATE: 73 BPM
WBC # BLD AUTO: 9.3 THOUSAND/UL (ref 3.8–10.8)

## 2018-01-22 VITALS
BODY MASS INDEX: 39.05 KG/M2 | SYSTOLIC BLOOD PRESSURE: 130 MMHG | DIASTOLIC BLOOD PRESSURE: 70 MMHG | HEIGHT: 66 IN | WEIGHT: 243 LBS

## 2018-01-22 VITALS
WEIGHT: 244 LBS | RESPIRATION RATE: 16 BRPM | DIASTOLIC BLOOD PRESSURE: 80 MMHG | SYSTOLIC BLOOD PRESSURE: 132 MMHG | BODY MASS INDEX: 39.21 KG/M2 | HEIGHT: 66 IN

## 2018-01-23 ENCOUNTER — HOSPITAL ENCOUNTER (OUTPATIENT)
Facility: HOSPITAL | Age: 63
Setting detail: OBSERVATION
Discharge: HOME | End: 2018-01-24
Attending: OBSTETRICS & GYNECOLOGY | Admitting: OBSTETRICS & GYNECOLOGY
Payer: COMMERCIAL

## 2018-01-23 ENCOUNTER — ANESTHESIA (OUTPATIENT)
Dept: PERIOP | Facility: HOSPITAL | Age: 63
End: 2018-01-23
Payer: COMMERCIAL

## 2018-01-23 VITALS
SYSTOLIC BLOOD PRESSURE: 118 MMHG | WEIGHT: 238.31 LBS | HEIGHT: 67 IN | HEART RATE: 82 BPM | DIASTOLIC BLOOD PRESSURE: 78 MMHG | BODY MASS INDEX: 37.4 KG/M2 | RESPIRATION RATE: 16 BRPM | TEMPERATURE: 98.3 F

## 2018-01-23 DIAGNOSIS — N85.02 ENDOMETRIAL INTRAEPITHELIAL NEOPLASIA (EIN): ICD-10-CM

## 2018-01-23 LAB
ABO GROUP BLD: NORMAL
BLD GP AB SCN SERPL QL: NEGATIVE
GLUCOSE SERPL-MCNC: 116 MG/DL (ref 65–140)
RH BLD: POSITIVE
SPECIMEN EXPIRATION DATE: NORMAL

## 2018-01-23 PROCEDURE — 86900 BLOOD TYPING SEROLOGIC ABO: CPT | Performed by: OBSTETRICS & GYNECOLOGY

## 2018-01-23 PROCEDURE — 88112 CYTOPATH CELL ENHANCE TECH: CPT | Performed by: OBSTETRICS & GYNECOLOGY

## 2018-01-23 PROCEDURE — 88331 PATH CONSLTJ SURG 1 BLK 1SPC: CPT | Performed by: PATHOLOGY

## 2018-01-23 PROCEDURE — 88332 PATH CONSLTJ SURG EA ADD BLK: CPT | Performed by: PATHOLOGY

## 2018-01-23 PROCEDURE — 88305 TISSUE EXAM BY PATHOLOGIST: CPT | Performed by: OBSTETRICS & GYNECOLOGY

## 2018-01-23 PROCEDURE — 86850 RBC ANTIBODY SCREEN: CPT | Performed by: OBSTETRICS & GYNECOLOGY

## 2018-01-23 PROCEDURE — 82948 REAGENT STRIP/BLOOD GLUCOSE: CPT

## 2018-01-23 PROCEDURE — 88309 TISSUE EXAM BY PATHOLOGIST: CPT | Performed by: OBSTETRICS & GYNECOLOGY

## 2018-01-23 PROCEDURE — 86901 BLOOD TYPING SEROLOGIC RH(D): CPT | Performed by: OBSTETRICS & GYNECOLOGY

## 2018-01-23 RX ORDER — PROPOFOL 10 MG/ML
INJECTION, EMULSION INTRAVENOUS AS NEEDED
Status: DISCONTINUED | OUTPATIENT
Start: 2018-01-23 | End: 2018-01-23 | Stop reason: SURG

## 2018-01-23 RX ORDER — ROCURONIUM BROMIDE 10 MG/ML
INJECTION, SOLUTION INTRAVENOUS AS NEEDED
Status: DISCONTINUED | OUTPATIENT
Start: 2018-01-23 | End: 2018-01-23 | Stop reason: SURG

## 2018-01-23 RX ORDER — KETAMINE HYDROCHLORIDE 50 MG/ML
INJECTION, SOLUTION, CONCENTRATE INTRAMUSCULAR; INTRAVENOUS AS NEEDED
Status: DISCONTINUED | OUTPATIENT
Start: 2018-01-23 | End: 2018-01-23 | Stop reason: SURG

## 2018-01-23 RX ORDER — OXYCODONE HYDROCHLORIDE AND ACETAMINOPHEN 5; 325 MG/1; MG/1
1 TABLET ORAL EVERY 6 HOURS PRN
Qty: 28 TABLET | Refills: 0 | Status: SHIPPED | OUTPATIENT
Start: 2018-01-23 | End: 2018-01-30

## 2018-01-23 RX ORDER — FENTANYL CITRATE/PF 50 MCG/ML
25 SYRINGE (ML) INJECTION
Status: DISCONTINUED | OUTPATIENT
Start: 2018-01-23 | End: 2018-01-23 | Stop reason: HOSPADM

## 2018-01-23 RX ORDER — OXYCODONE HYDROCHLORIDE AND ACETAMINOPHEN 5; 325 MG/1; MG/1
1 TABLET ORAL EVERY 4 HOURS PRN
Status: DISCONTINUED | OUTPATIENT
Start: 2018-01-23 | End: 2018-01-24 | Stop reason: HOSPADM

## 2018-01-23 RX ORDER — GLYCOPYRROLATE 0.2 MG/ML
INJECTION INTRAMUSCULAR; INTRAVENOUS AS NEEDED
Status: DISCONTINUED | OUTPATIENT
Start: 2018-01-23 | End: 2018-01-23 | Stop reason: SURG

## 2018-01-23 RX ORDER — MAGNESIUM HYDROXIDE 1200 MG/15ML
LIQUID ORAL AS NEEDED
Status: DISCONTINUED | OUTPATIENT
Start: 2018-01-23 | End: 2018-01-23 | Stop reason: HOSPADM

## 2018-01-23 RX ORDER — SODIUM CHLORIDE, SODIUM LACTATE, POTASSIUM CHLORIDE, CALCIUM CHLORIDE 600; 310; 30; 20 MG/100ML; MG/100ML; MG/100ML; MG/100ML
125 INJECTION, SOLUTION INTRAVENOUS CONTINUOUS
Status: CANCELLED | OUTPATIENT
Start: 2018-01-23

## 2018-01-23 RX ORDER — EPHEDRINE SULFATE 50 MG/ML
INJECTION, SOLUTION INTRAVENOUS AS NEEDED
Status: DISCONTINUED | OUTPATIENT
Start: 2018-01-23 | End: 2018-01-23 | Stop reason: SURG

## 2018-01-23 RX ORDER — SODIUM CHLORIDE, SODIUM LACTATE, POTASSIUM CHLORIDE, CALCIUM CHLORIDE 600; 310; 30; 20 MG/100ML; MG/100ML; MG/100ML; MG/100ML
125 INJECTION, SOLUTION INTRAVENOUS CONTINUOUS
Status: DISCONTINUED | OUTPATIENT
Start: 2018-01-23 | End: 2018-01-23

## 2018-01-23 RX ORDER — ACETAMINOPHEN 325 MG/1
650 TABLET ORAL EVERY 6 HOURS SCHEDULED
Status: DISCONTINUED | OUTPATIENT
Start: 2018-01-23 | End: 2018-01-24 | Stop reason: HOSPADM

## 2018-01-23 RX ORDER — LIDOCAINE HYDROCHLORIDE 10 MG/ML
INJECTION, SOLUTION INFILTRATION; PERINEURAL AS NEEDED
Status: DISCONTINUED | OUTPATIENT
Start: 2018-01-23 | End: 2018-01-23 | Stop reason: SURG

## 2018-01-23 RX ORDER — SODIUM CHLORIDE 9 MG/ML
125 INJECTION, SOLUTION INTRAVENOUS CONTINUOUS
Status: DISCONTINUED | OUTPATIENT
Start: 2018-01-23 | End: 2018-01-24

## 2018-01-23 RX ORDER — ONDANSETRON 2 MG/ML
4 INJECTION INTRAMUSCULAR; INTRAVENOUS EVERY 6 HOURS PRN
Status: CANCELLED | OUTPATIENT
Start: 2018-01-23

## 2018-01-23 RX ORDER — PANTOPRAZOLE SODIUM 20 MG/1
20 TABLET, DELAYED RELEASE ORAL
Status: DISCONTINUED | OUTPATIENT
Start: 2018-01-24 | End: 2018-01-24 | Stop reason: HOSPADM

## 2018-01-23 RX ORDER — BUPIVACAINE HYDROCHLORIDE 2.5 MG/ML
INJECTION, SOLUTION INFILTRATION; PERINEURAL AS NEEDED
Status: DISCONTINUED | OUTPATIENT
Start: 2018-01-23 | End: 2018-01-23 | Stop reason: HOSPADM

## 2018-01-23 RX ORDER — IBUPROFEN 600 MG/1
600 TABLET ORAL EVERY 6 HOURS PRN
Status: DISCONTINUED | OUTPATIENT
Start: 2018-01-23 | End: 2018-01-24 | Stop reason: HOSPADM

## 2018-01-23 RX ORDER — ONDANSETRON 2 MG/ML
4 INJECTION INTRAMUSCULAR; INTRAVENOUS EVERY 4 HOURS PRN
Status: DISCONTINUED | OUTPATIENT
Start: 2018-01-23 | End: 2018-01-24 | Stop reason: HOSPADM

## 2018-01-23 RX ORDER — ONDANSETRON 2 MG/ML
INJECTION INTRAMUSCULAR; INTRAVENOUS AS NEEDED
Status: DISCONTINUED | OUTPATIENT
Start: 2018-01-23 | End: 2018-01-23 | Stop reason: SURG

## 2018-01-23 RX ORDER — FENOFIBRATE 145 MG/1
145 TABLET, COATED ORAL DAILY
Status: DISCONTINUED | OUTPATIENT
Start: 2018-01-24 | End: 2018-01-24 | Stop reason: HOSPADM

## 2018-01-23 RX ORDER — ONDANSETRON 2 MG/ML
4 INJECTION INTRAMUSCULAR; INTRAVENOUS ONCE AS NEEDED
Status: COMPLETED | OUTPATIENT
Start: 2018-01-23 | End: 2018-01-23

## 2018-01-23 RX ORDER — SODIUM CHLORIDE 9 MG/ML
INJECTION, SOLUTION INTRAVENOUS CONTINUOUS PRN
Status: DISCONTINUED | OUTPATIENT
Start: 2018-01-23 | End: 2018-01-23

## 2018-01-23 RX ORDER — FENTANYL CITRATE 50 UG/ML
INJECTION, SOLUTION INTRAMUSCULAR; INTRAVENOUS AS NEEDED
Status: DISCONTINUED | OUTPATIENT
Start: 2018-01-23 | End: 2018-01-23 | Stop reason: SURG

## 2018-01-23 RX ORDER — IBUPROFEN 200 MG
600 TABLET ORAL EVERY 6 HOURS PRN
Refills: 0
Start: 2018-01-23 | End: 2018-10-08

## 2018-01-23 RX ADMIN — SODIUM CHLORIDE 125 ML/HR: 0.9 INJECTION, SOLUTION INTRAVENOUS at 17:00

## 2018-01-23 RX ADMIN — EPHEDRINE SULFATE 5 MG: 50 INJECTION, SOLUTION INTRAMUSCULAR; INTRAVENOUS; SUBCUTANEOUS at 12:38

## 2018-01-23 RX ADMIN — ONDANSETRON 4 MG: 2 INJECTION INTRAMUSCULAR; INTRAVENOUS at 11:34

## 2018-01-23 RX ADMIN — KETAMINE HYDROCHLORIDE 30 MG: 50 INJECTION, SOLUTION INTRAMUSCULAR; INTRAVENOUS at 12:13

## 2018-01-23 RX ADMIN — HYDROMORPHONE HYDROCHLORIDE 0.2 MG: 1 INJECTION, SOLUTION INTRAMUSCULAR; INTRAVENOUS; SUBCUTANEOUS at 16:54

## 2018-01-23 RX ADMIN — ONDANSETRON 4 MG: 2 INJECTION INTRAMUSCULAR; INTRAVENOUS at 19:07

## 2018-01-23 RX ADMIN — LIDOCAINE HYDROCHLORIDE 80 MG: 10 INJECTION, SOLUTION INFILTRATION; PERINEURAL at 11:39

## 2018-01-23 RX ADMIN — FENTANYL CITRATE 25 MCG: 50 INJECTION, SOLUTION INTRAMUSCULAR; INTRAVENOUS at 14:43

## 2018-01-23 RX ADMIN — ONDANSETRON 4 MG: 2 INJECTION INTRAMUSCULAR; INTRAVENOUS at 13:54

## 2018-01-23 RX ADMIN — GLYCOPYRROLATE 0.2 MG: 0.2 INJECTION, SOLUTION INTRAMUSCULAR; INTRAVENOUS at 13:54

## 2018-01-23 RX ADMIN — ONDANSETRON 4 MG: 2 INJECTION INTRAMUSCULAR; INTRAVENOUS at 15:27

## 2018-01-23 RX ADMIN — PROPOFOL 180 MG: 10 INJECTION, EMULSION INTRAVENOUS at 11:39

## 2018-01-23 RX ADMIN — FENTANYL CITRATE 25 MCG: 50 INJECTION, SOLUTION INTRAMUSCULAR; INTRAVENOUS at 14:55

## 2018-01-23 RX ADMIN — NEOSTIGMINE METHYLSULFATE 4 MG: 1 INJECTION, SOLUTION INTRAMUSCULAR; INTRAVENOUS; SUBCUTANEOUS at 13:54

## 2018-01-23 RX ADMIN — CEFAZOLIN SODIUM 2000 MG: 2 SOLUTION INTRAVENOUS at 11:50

## 2018-01-23 RX ADMIN — ROCURONIUM BROMIDE 20 MG: 10 INJECTION INTRAVENOUS at 12:41

## 2018-01-23 RX ADMIN — FENTANYL CITRATE 25 MCG: 50 INJECTION, SOLUTION INTRAMUSCULAR; INTRAVENOUS at 15:30

## 2018-01-23 RX ADMIN — SODIUM CHLORIDE, SODIUM LACTATE, POTASSIUM CHLORIDE, AND CALCIUM CHLORIDE 125 ML/HR: .6; .31; .03; .02 INJECTION, SOLUTION INTRAVENOUS at 10:10

## 2018-01-23 RX ADMIN — HYDROMORPHONE HYDROCHLORIDE 0.2 MG: 1 INJECTION, SOLUTION INTRAMUSCULAR; INTRAVENOUS; SUBCUTANEOUS at 17:30

## 2018-01-23 RX ADMIN — ROCURONIUM BROMIDE 50 MG: 10 INJECTION INTRAVENOUS at 11:39

## 2018-01-23 RX ADMIN — ENOXAPARIN SODIUM 40 MG: 40 INJECTION SUBCUTANEOUS at 10:16

## 2018-01-23 RX ADMIN — ACETAMINOPHEN 650 MG: 325 TABLET, FILM COATED ORAL at 23:48

## 2018-01-23 RX ADMIN — FENTANYL CITRATE 100 MCG: 50 INJECTION, SOLUTION INTRAMUSCULAR; INTRAVENOUS at 11:39

## 2018-01-23 RX ADMIN — SODIUM CHLORIDE: 0.9 INJECTION, SOLUTION INTRAVENOUS at 11:40

## 2018-01-23 RX ADMIN — GLYCOPYRROLATE 0.2 MG: 0.2 INJECTION, SOLUTION INTRAMUSCULAR; INTRAVENOUS at 11:34

## 2018-01-23 RX ADMIN — FENTANYL CITRATE 25 MCG: 50 INJECTION, SOLUTION INTRAMUSCULAR; INTRAVENOUS at 15:04

## 2018-01-23 RX ADMIN — DEXAMETHASONE SODIUM PHOSPHATE 10 MG: 10 INJECTION, SOLUTION INTRAMUSCULAR; INTRAVENOUS at 12:11

## 2018-01-23 RX ADMIN — KETAMINE HYDROCHLORIDE 20 MG: 50 INJECTION, SOLUTION INTRAMUSCULAR; INTRAVENOUS at 13:11

## 2018-01-23 NOTE — DISCHARGE INSTRUCTIONS
Laparoscopic Hysterectomy   WHAT YOU SHOULD KNOW:   Laparoscopic hysterectomy [facility]) is surgery to remove your uterus only (partial hysterectomy), or your uterus and cervix (total hysterectomy)  Other organs, such as your ovaries and fallopian tubes, may also be removed  AFTER YOU LEAVE:   Medicines:   · Medicines  may be given to decrease pain or to treat or prevent a bacterial infection  Ask your primary healthcare provider [facility]) how to take prescription pain medicine safely  You may also need to take hormone medicine, such as estrogen  · Take your medicine as directed  Contact your PHP if you think your medicine is not helping or if you have side effects  Tell him if you are allergic to any medicine  Keep a list of the medicines, vitamins, and herbs you take  Include the amounts, and when and why you take them  Bring the list or the pill bottles to follow-up visits  Carry your medicine list with you in case of an emergency  Activity guidelines: You may feel like resting more after surgery  Slowly start to do more each day  Rest when you feel it is needed  Ask when you can return to your usual activities  What to expect after surgery:   · Ask your gynecologist or PHP about routine tests that you will need  · It is normal to have some bleeding from your vagina after certain types of hysterectomy surgery  Ask your gynecologist or PHP if you should expect bleeding, and how much bleeding to expect  Contact your gynecologist or PHP if:   · You have a fever  · You have pain that gets worse or does not decrease or go away with treatment  · You notice pus or a foul-smelling drainage coming from an incision, or it is red or swollen  · You have new or more bright red bleeding from your vagina or your incisions  · You have yellow, green, or foul-smelling discharge coming from your vagina  · You have trouble urinating, burning when you urinate, or feel a need to urinate often      · You have trouble having a bowel movement  · Your skin is itchy, swollen, or has a rash  · You have questions or concerns about your condition or care  Seek care immediately or call 911 if:   · You are bleeding from your vagina, or bleeding more than you were told to expect  · Your arm or leg feels warm, tender, and painful  It may look swollen and red  · You feel lightheaded, short of breath, and have chest pain  · You cough up blood  © 2014 [address] is for End User's use only and may not be sold, redistributed or otherwise used for commercial purposes  All illustrations and images included in CareNotes® are the copyrighted property of [employer]  or [facility]  The above information is an  only  It is not intended as medical advice for individual conditions or treatments  Talk to your doctor, nurse or pharmacist before following any medical regimen to see if it is safe and effective for you  You will have pain post-operatively while you recover  Please take the pain meds as needed  Nothing in the vagina for 4-6 weeks until cleared by your physician  This includes no intercourse or sexual activity  Do not carry anything heavier than a gallon of a milk for 1-2 weeks  No driving while requiring pain meds  You may have light spotting, but any heavy bleeding requiring 1 pad/hour is not normal   You have multiple small incisions on your abdomen  Daily soap and water will suffice for cleaning  Please monitor signs of infection like redness, pain, white discharge, bleeding from incision sites  Please call your Gyn for any worsening pain, fevers, nausea/vomiting, or signs of infection  Thank you

## 2018-01-23 NOTE — PERIOPERATIVE NURSING NOTE
Spoke with Dr Fazal Allan earlier regarding pts sleepiness and pain  Pt now being admitted overnight   in to see pt and is aware

## 2018-01-23 NOTE — ANESTHESIA POSTPROCEDURE EVALUATION
Post-Op Assessment Note      CV Status:  Stable    Mental Status:  Alert and awake    Hydration Status:  Euvolemic    PONV Controlled:  Controlled    Airway Patency:  Patent    Post Op Vitals Reviewed: Yes          Staff: Anesthesiologist, CRNA           BP   134/66   Temp   97 2   Pulse  68   Resp   12   SpO2   97%

## 2018-01-23 NOTE — ANESTHESIA PREPROCEDURE EVALUATION
Review of Systems/Medical History  Patient summary reviewed  Chart reviewed      Cardiovascular  EKG reviewed, Hyperlipidemia,    Pulmonary  Sleep apnea ,        GI/Hepatic    GERD well controlled,        Negative  ROS        Endo/Other  Diabetes ,   Comment: Pre DM    GYN      Comment: S/P BLT     Hematology  Negative hematology ROS      Musculoskeletal    Comment: S/P b/l TKR Arthritis     Neurology  Negative neurology ROS      Psychology   Negative psychology ROS              Physical Exam    Airway    Mallampati score: III  TM Distance: <3 FB  Neck ROM: full     Dental   Comment: BOTTOM TEETH LOST, upper dentures,     Cardiovascular  Cardiovascular exam normal    Pulmonary  Pulmonary exam normal     Other Findings        Anesthesia Plan  ASA Score- 3     Anesthesia Type- general with ASA Monitors  Additional Monitors:   Airway Plan: ETT  Comment: CAN NOT FIND cbc  Plan Factors-  Patient did not smoke on day of surgery  Induction- intravenous  Postoperative Plan-     Informed Consent- Anesthetic plan and risks discussed with patient  I personally reviewed this patient with the CRNA  Discussed and agreed on the Anesthesia Plan with the VIRGINIA Serra

## 2018-01-23 NOTE — PERIOPERATIVE NURSING NOTE
Pt c/o 7/10 upon arousing  VSS  Pt snoaring soundly  GYN resident paged to inform of pts post op status

## 2018-01-23 NOTE — OP NOTE
OPERATIVE REPORT  PATIENT NAME: Gricelda Zuniga    :  1955  MRN: [id number]  Pt Location: [facility] OR ROOM 14    SURGERY DATE: 2018    Surgeon(s) and Role:     * Saray Vidal MD - Assisting     * Queen Chad MD - Primary     * Adriana Newby, [address] [place] [address],[address] [facility], MD - Assisting    Preop Diagnosis:  Endometrial intraepithelial neoplasia (EIN) [N85 02]    Post-Op Diagnosis Codes:     * Endometrial intraepithelial neoplasia (EIN) [N85 02]    Procedure(s) (LRB):  ROBOTIC ASSISTED TOTAL LAPAROSCOPIC HYSTERECTOMY; BILATERAL SALPINGOOPHERECTOMY (N/A)  CYSTOSCOPY (N/A)    Specimen(s):  ID Type Source Tests Collected by Time Destination   1 :  Washing Pelvic Washing NON-GYNECOLOGIC CYTOLOGY Adriana Newby PA-C 2018 1228    2 : with cervix Tissue Uterus w/Bilateral Ovaries and Fallopian Tubes TISSUE EXAM Queen Chad MD 2018 1310        Estimated Blood Loss:   Minimal    Drains:       Anesthesia Type:   General    Operative Indications:  Endometrial intraepithelial neoplasia (EIN) []  58year-old with endometrial biopsy demonstrating atypical cells with abnormal P 53 staining  She presented for definitive operative management and potential surgical staging  Operative Findings:  1  Grossly normal cervix, mobile uterus measuring approximately 9 weeks  2  On laparoscopy, no evidence of upper abdominal disease  No peritoneal disease  Ovaries grossly normal bilaterally  Frozen section of the endometrial cavity revealed atypical hyperplasia within a polyp  There was no evidence of malignancy  3   Cystoscopy with bilateral jets and no bladder injury  Complications:   None    Procedure and Technique:  After informed consent was obtained, the patient was taken the operating room where general endotracheal anesthesia was administered without incident    She was then prepped and draped in normal sterile fashion in the low dorsal lithotomy position  Examination under anesthesia revealed the above-mentioned findings  A speculum was placed in the patient's vagina  The anterior lip of the cervix was grasped with single-tooth tenaculum  The cervix was dilated with Angel Netters dilators  A 0 Vicryl stay suture was placed at 3 o'clock on the cervix  This was used to secure the OSBALDO uterine manipulator and koh cup  A Brunner catheter was inserted  Tension was then turned to the abdomen  0 25% Marcaine was used to infiltrate the skin prior to placement of any trocar  The 1st insertion site was approximately 4 cm superior to the umbilicus in the midline  5 mm skin incision was made using 11 blade scalpel  Through this was passed a 5 mm trocar under direct visualization into the abdominal cavity  The abdomen is then insufflated to 15 mm of mercury using CO2 gas  Findings are noted as above  Additional trocars then placed under direct visualization  Two 8 mm trocars were placed on left side 1 on the right side  The 5 mm port the midline was exchanged for an 8 mm robotic trocar  A 12 mm trocar placed in the right upper quadrant as an assistant port  Peritoneal cytology was obtained  The retroperitoneal spaces were opened bilaterally using monopolar cautery  The round ligaments were cauterized and transected  The ureters were identified coursing normally within the medial leaf of the broad ligament  The infundibulopelvic ligaments were then skeletonized, cauterized and transected  The vesicovaginal space was opened using monopolar cautery as well as sharp dissection  The bladder was taken down below the level of the external os of the cervix  The uterine vessels were skeletonized they were cauterized and transected  The cardinal ligaments were then cauterized and transected  A circumferential colpotomy was then performed using monopolar cautery  The uterus, cervix, bilateral tubes and ovaries were then removed transvaginally  Frozen section results are as above  The vaginal apex was then secured using a running 2-0 stratafix suture with excellent hemostasis noted  The pelvis was irrigated  The pressure in the pelvis was brought down to 5 mm of mercury  There was a small amount of bleeding noted in the rectosigmoid mesentery that was controlled using a figure-of-eight 3-0 Vicryl suture  Hemostasis was excellent  The robot was undocked  The gas was removed from the abdominal cavity  The ports were removed under direct visualization  The skin was closed at all sites using 4-0 Monocryl followed by histoacryl  The Brunner catheter was removed  The suction irrigation device was used insufflate the bladder with 200 cc of normal saline  The 5 mm laparoscoped was then used as a cystoscope with findings noted as above  The Brunner catheter was then replaced to drain the bladder  It was then removed  The vagina was inspected  There was no evidence of bleeding noted  The patient was then awakened and transferred to recovery room stable condition  All instrument counts were correct x2 for the procedure  No complications  Estimated blood loss is 50 mL     I was present for the entire procedure    Patient Disposition:  PACU     SIGNATURE: Aubrey Pablo MD  DATE: January 23, 2018  TIME: 2:16 PM

## 2018-01-24 VITALS
HEART RATE: 77 BPM | HEIGHT: 68 IN | SYSTOLIC BLOOD PRESSURE: 124 MMHG | RESPIRATION RATE: 16 BRPM | DIASTOLIC BLOOD PRESSURE: 60 MMHG | BODY MASS INDEX: 36.68 KG/M2 | WEIGHT: 242 LBS | TEMPERATURE: 98.3 F | OXYGEN SATURATION: 93 %

## 2018-01-24 VITALS
SYSTOLIC BLOOD PRESSURE: 124 MMHG | HEIGHT: 66 IN | DIASTOLIC BLOOD PRESSURE: 80 MMHG | WEIGHT: 243.6 LBS | RESPIRATION RATE: 14 BRPM | BODY MASS INDEX: 39.15 KG/M2

## 2018-01-24 LAB
ANION GAP SERPL CALCULATED.3IONS-SCNC: 8 MMOL/L (ref 4–13)
BASOPHILS # BLD AUTO: 0.01 THOUSANDS/ΜL (ref 0–0.1)
BASOPHILS NFR BLD AUTO: 0 % (ref 0–1)
BUN SERPL-MCNC: 8 MG/DL (ref 5–25)
CALCIUM SERPL-MCNC: 8.7 MG/DL (ref 8.3–10.1)
CHLORIDE SERPL-SCNC: 108 MMOL/L (ref 100–108)
CO2 SERPL-SCNC: 25 MMOL/L (ref 21–32)
CREAT SERPL-MCNC: 0.73 MG/DL (ref 0.6–1.3)
EOSINOPHIL # BLD AUTO: 0 THOUSAND/ΜL (ref 0–0.61)
EOSINOPHIL NFR BLD AUTO: 0 % (ref 0–6)
ERYTHROCYTE [DISTWIDTH] IN BLOOD BY AUTOMATED COUNT: 13.7 % (ref 11.6–15.1)
GFR SERPL CREATININE-BSD FRML MDRD: 89 ML/MIN/1.73SQ M
GLUCOSE SERPL-MCNC: 129 MG/DL (ref 65–140)
HCT VFR BLD AUTO: 35.3 % (ref 34.8–46.1)
HGB BLD-MCNC: 10.9 G/DL (ref 11.5–15.4)
LYMPHOCYTES # BLD AUTO: 1 THOUSANDS/ΜL (ref 0.6–4.47)
LYMPHOCYTES NFR BLD AUTO: 7 % (ref 14–44)
MCH RBC QN AUTO: 28.1 PG (ref 26.8–34.3)
MCHC RBC AUTO-ENTMCNC: 30.9 G/DL (ref 31.4–37.4)
MCV RBC AUTO: 91 FL (ref 82–98)
MONOCYTES # BLD AUTO: 0.93 THOUSAND/ΜL (ref 0.17–1.22)
MONOCYTES NFR BLD AUTO: 6 % (ref 4–12)
NEUTROPHILS # BLD AUTO: 12.78 THOUSANDS/ΜL (ref 1.85–7.62)
NEUTS SEG NFR BLD AUTO: 87 % (ref 43–75)
NRBC BLD AUTO-RTO: 0 /100 WBCS
PLATELET # BLD AUTO: 338 THOUSANDS/UL (ref 149–390)
PMV BLD AUTO: 10.4 FL (ref 8.9–12.7)
POTASSIUM SERPL-SCNC: 3.9 MMOL/L (ref 3.5–5.3)
RBC # BLD AUTO: 3.88 MILLION/UL (ref 3.81–5.12)
SODIUM SERPL-SCNC: 141 MMOL/L (ref 136–145)
WBC # BLD AUTO: 14.77 THOUSAND/UL (ref 4.31–10.16)

## 2018-01-24 PROCEDURE — 85025 COMPLETE CBC W/AUTO DIFF WBC: CPT | Performed by: OBSTETRICS & GYNECOLOGY

## 2018-01-24 PROCEDURE — 99024 POSTOP FOLLOW-UP VISIT: CPT | Performed by: OBSTETRICS & GYNECOLOGY

## 2018-01-24 PROCEDURE — 80048 BASIC METABOLIC PNL TOTAL CA: CPT | Performed by: OBSTETRICS & GYNECOLOGY

## 2018-01-24 RX ORDER — DOCUSATE SODIUM 100 MG/1
100 CAPSULE, LIQUID FILLED ORAL 2 TIMES DAILY
Status: DISCONTINUED | OUTPATIENT
Start: 2018-01-24 | End: 2018-01-24 | Stop reason: HOSPADM

## 2018-01-24 RX ORDER — OXYCODONE HYDROCHLORIDE AND ACETAMINOPHEN 5; 325 MG/1; MG/1
1 TABLET ORAL EVERY 4 HOURS PRN
Refills: 0
Start: 2018-01-24 | End: 2018-02-03

## 2018-01-24 RX ADMIN — IBUPROFEN 600 MG: 600 TABLET ORAL at 04:51

## 2018-01-24 RX ADMIN — PANTOPRAZOLE SODIUM 20 MG: 20 TABLET, DELAYED RELEASE ORAL at 05:52

## 2018-01-24 RX ADMIN — DOCUSATE SODIUM 100 MG: 100 CAPSULE, LIQUID FILLED ORAL at 08:49

## 2018-01-24 RX ADMIN — ACETAMINOPHEN 650 MG: 325 TABLET, FILM COATED ORAL at 05:52

## 2018-01-24 RX ADMIN — ENOXAPARIN SODIUM 40 MG: 40 INJECTION SUBCUTANEOUS at 07:05

## 2018-01-24 RX ADMIN — SODIUM CHLORIDE 125 ML/HR: 0.9 INJECTION, SOLUTION INTRAVENOUS at 00:56

## 2018-01-24 RX ADMIN — FENOFIBRATE 145 MG: 145 TABLET ORAL at 08:49

## 2018-01-24 NOTE — CASE MANAGEMENT
Thank you,  [address] [facility] in the [facility] by Leonel Wang for 2017  Network Utilization Review Department  Phone: [phone]; Fax [phone]  ATTENTION: The Network Utilization Review Department is now centralized for our 7 Facilities  Make a note that we have a new phone and fax numbers for our Department  Please call with any questions or concerns to [phone] and carefully follow the prompts so that you are directed to the right person  All voicemails are confidential  Fax any determinations, approvals, denials, and requests for initial or continue stay review clinical to [phone]  Due to HIGH CALL volume, it would be easier if you could please send faxed requests to expedite your requests and in part, help us provide discharge notifications faster     =========================================================================    Initial Clinical Review    Age/Sex: 58 y o  female    Surgery Date: 01/23/2018    Procedure: ROBOTIC ASSISTED TOTAL LAPAROSCOPIC HYSTERECTOMY; BILATERAL SALPINGOOPHERECTOMY (N/A)  CYSTOSCOPY (N/A)    Anesthesia: General    Admission Orders: Date/Time/Statement: 01/23/2018 @ 1639  Orders Placed This Encounter   Procedures    Place in Observation     Standing Status:   Standing     Number of Occurrences:   1     Order Specific Question:   Admitting Physician     Answer:   Rochelle Hebert     Order Specific Question:   Level of Care     Answer:   Med Surg [16]       Vital Signs: /60 (BP Location: Right arm)   Pulse 77   Temp 98 3 °F (36 8 °C) (Oral)   Resp 16   Ht 5' 8" (1 727 m)   Wt 110 kg (242 lb)   SpO2 93%   BMI 36 80 kg/m²     Diet:        Diet Orders            Start     Ordered    01/23/18 1936  Diet Regular; Regular House;  Lo Cholesterol  Diet effective now     Question Answer Comment   Diet Type Regular    Regular Regular House    Other Restriction(s): Lo Cholesterol    RD to adjust diet per protocol?  Yes        01/23/18 1935          Mobility: Up & OOB as tolerated    DVT Prophylaxis: Ministerio SCDs    Pain Control:   Pain Medications            1x ibuprofen (MOTRIN) 200 mg tablet Take 3 tablets by mouth every 6 (six) hours as needed for mild pain    oxyCODONE-acetaminophen (PERCOCET) 5-325 mg per tablet Take 1 tablet by mouth every 6 (six) hours as needed for moderate pain for up to 7 days Max Daily Amount: 4 tablets    oxyCODONE-acetaminophen (PERCOCET) 5-325 mg per tablet Take 1 tablet by mouth every 4 (four) hours as needed for moderate pain for up to 10 days Max Daily Amount: 6 tablets        Scheduled Meds:  acetaminophen 650 mg Oral Q6H [address]   docusate sodium 100 mg Oral BID   fenofibrate 145 mg Oral Daily   pantoprazole 20 mg Oral Early Morning     Continuous Infusions: IV flds NSS @ 125 > DCed 1/24  PRN Meds: ibuprofen    ondansetron    oxyCODONE-acetaminophen

## 2018-01-24 NOTE — PROGRESS NOTES
Gyn Oncology Progress note   Michael Guadalupe 58 y o  female MRN: [id number]  Unit/Bed#: [facility] 345-01 Encounter: [id number]    Michael Guadalupe has no current complaints  Pain is present - adequately treated  Patient is currently voiding  She is ambulating  Patient is currently passing flatus and has had no bowel movement  She is tolerating PO, and denies nausea or vomitting  Patient denies fever, chills, chest pain, shortness of breath, or calf tenderness  /67 (BP Location: Left arm)   Pulse 65   Temp 98 2 °F (36 8 °C) (Oral)   Resp 18   Ht 5' 8" (1 727 m)   Wt 110 kg (242 lb)   SpO2 92%   BMI 36 80 kg/m²     I/O last 3 completed shifts: In: 2000 [I V :2000]  Out: 150 [Urine:150]  I/O this shift: In: 5 [P O :420]  Out: -     Lab Results   Component Value Date    WBC 14 77 (H) 01/24/2018    HGB 10 9 (L) 01/24/2018    HCT 35 3 01/24/2018    MCV 91 01/24/2018     01/24/2018       Lab Results   Component Value Date    GLUCOSE 102 12/01/2017    CALCIUM 9 3 12/01/2017     12/01/2017    K 4 2 12/01/2017    CO2 29 12/01/2017     12/01/2017    BUN 15 12/01/2017    CREATININE 0 77 12/01/2017       No results found for: MG    Lab Results   Component Value Date/Time    POCGLU 116 01/23/2018 02:23 PM       Physical Exam  Gen: AAOx3, NAD, comfortable in bed   CVS:  RRR, no murmurs or gallops  Lungs: CTA B/L, no rales or rhonchi,  normal respiratory effort and rate  Abdomen: soft, non tender, incisions C/D/I  Extremities: SCDs on and on, non tender, no edema, negative Homans       A/P: Michael Guadalupe s/p RATLH + BSO and cysto POD# 1, in stable condition   1)Surgery: frozen with benign findings, f/u final path   2) Pain: well controlled on current regimen, continue   3) FEN: will DC fluids, regular ADAT  4) Bowel regimen: colace PRN  5) PPX: SCD's, Encouraged incentive spirometry to reduce atelectasis and pneumonia risk, will give 1 dose of lovenox while inpatient,     Dispo: anticipate DC home today

## 2018-01-31 PROBLEM — Z98.890 POSTOPERATIVE STATE: Status: ACTIVE | Noted: 2018-01-31

## 2018-02-01 ENCOUNTER — OFFICE VISIT (OUTPATIENT)
Dept: GYNECOLOGIC ONCOLOGY | Facility: CLINIC | Age: 63
End: 2018-02-01

## 2018-02-01 VITALS
BODY MASS INDEX: 36.07 KG/M2 | TEMPERATURE: 97.9 F | SYSTOLIC BLOOD PRESSURE: 124 MMHG | DIASTOLIC BLOOD PRESSURE: 80 MMHG | WEIGHT: 238 LBS | HEIGHT: 68 IN | HEART RATE: 97 BPM | RESPIRATION RATE: 16 BRPM

## 2018-02-01 DIAGNOSIS — G89.18 POST-OPERATIVE PAIN: ICD-10-CM

## 2018-02-01 DIAGNOSIS — Z98.890 POSTOPERATIVE STATE: Primary | ICD-10-CM

## 2018-02-01 DIAGNOSIS — N85.02 COMPLEX ATYPICAL ENDOMETRIAL HYPERPLASIA: ICD-10-CM

## 2018-02-01 PROCEDURE — 99024 POSTOP FOLLOW-UP VISIT: CPT | Performed by: PHYSICIAN ASSISTANT

## 2018-02-01 RX ORDER — HYDROCODONE BITARTRATE AND ACETAMINOPHEN 5; 325 MG/1; MG/1
TABLET ORAL
Qty: 20 TABLET | Refills: 0
Start: 2018-02-01 | End: 2018-10-08

## 2018-02-01 NOTE — PROGRESS NOTES
Assessment/Plan:    Problem List Items Addressed This Visit        Genitourinary    Complex atypical endometrial hyperplasia       Other    Postoperative state - Primary     S/p robotic TLH, BSO on 1/23/18 for complex atypical hyperplasia of the endometrium  No malignancy identified  Counseled to refrain from heavy lifting, baths, vaginal intercourse  Return to the office in 4 weeks for continued post-operative evaluation and vaginal cuff exam           Post-operative pain     Persistent post-operative pain surrounding her umbilicus and mid-abdomen  No improvement in pain  Continues to require narcotic therapy as well as NSAIDs  Physical exam benign  Plan for CT abd/pelvis as well as cbc/diff and CMP  Prescribed additional prescription for norco 5/325 #20/0, as the patient felt percocet was too strong  The patient will call the office immediately with worsening pain, n/v or fever  Relevant Medications    HYDROcodone-acetaminophen (NORCO) 5-325 mg per tablet    Other Relevant Orders    CT abdomen pelvis w contrast    Comprehensive metabolic panel    CBC and differential            CHIEF COMPLAINT:  Post-operative evaluation    Problem: Thickened endometrium, atypical complex hyperplasia      Previous therapy:  1  Robotic assisted total laparoscopic hysterectomy, bilateral salpingo-oophorectomy and cystoscopy on 1/23/2018  A  Simplex and focal complex atypical hyperplasia involving a polyp  No malignancy  Patient ID: Eder Mcarthur is a 58 y o  female  Who underwent the above mentioned procedure and presents for initial post-operative evaluation  She has been afebrile  The patient is voiding and moving her bowels without difficulty  She is using colace  The patient notes persistent abdominal pain around her umbilicus, which is "deep and pulling"  She continues to use narcotics, as well as ibuprofen  She notes there has been no improvement in her pain  She denies nausea or vomiting   She is without vaginal bleeding  Review of Systems   Constitutional: Negative  Respiratory: Negative  Cardiovascular: Negative  Gastrointestinal: Positive for abdominal pain  Negative for constipation, diarrhea, nausea and vomiting  Genitourinary: Negative  Skin: Negative  Current Outpatient Prescriptions:     DiphenhydrAMINE HCl, Sleep, (UNISOM SLEEPGELS PO), Take by mouth daily at bedtime, Disp: , Rfl:     fenofibrate (TRICOR) 145 mg tablet, Take 145 mg by mouth daily, Disp: , Rfl:     ibuprofen (MOTRIN) 200 mg tablet, Take 3 tablets by mouth every 6 (six) hours as needed for mild pain, Disp: , Rfl: 0    LUTEIN PO, Take by mouth daily, Disp: , Rfl:     omeprazole (PriLOSEC) 20 mg delayed release capsule, Take 20 mg by mouth daily, Disp: , Rfl:     oxyCODONE-acetaminophen (PERCOCET) 5-325 mg per tablet, Take 1 tablet by mouth every 4 (four) hours as needed for moderate pain for up to 10 days Max Daily Amount: 6 tablets, Disp: , Rfl: 0    Objective:    Blood pressure 124/80, pulse 97, temperature 97 9 °F (36 6 °C), temperature source Oral, resp  rate 16, height 5' 8" (1 727 m), weight 108 kg (238 lb)  Physical Exam   Constitutional: She is oriented to person, place, and time  She appears well-developed and well-nourished  Pulmonary/Chest: Effort normal    Abdominal: Soft  She exhibits no distension  There is no tenderness  There is no rebound  Neurological: She is alert and oriented to person, place, and time  Skin: Skin is warm and dry  Surgical trocar sites are intact, clean and dry  No evidence of induration, erythema or purulent drainage  Psychiatric: She has a normal mood and affect   Her behavior is normal        Pathology Report:    Case Report   Surgical Pathology Report                         Case: [id number]                                    Authorizing Provider: Abner Hernandez MD        Collected:           01/23/2018 1310               Ordering Location:     [facility]      Received:            01/23/2018 1319                                      Hospital Operating Room                                                       Pathologist:           Celeste Verduzco MD                                                         Intraop:               Jazzy Coelho MD                                                               Specimen:    Uterus w/Bilateral Ovaries and Fallopian Tubes, with cervix                                Final Diagnosis   A  Uterus with cervix (85 5 g) and bilateral ovaries and fallopian tubes:     - Simple and focal complex atypical hyperplasia, involving an endometrial polyp and focally adjacent        endometrium (see note)  - Inactive endometrium with cystic atrophy  - Adenomyosis  - Leiomyomata (0 5 cm in greatest dimension)  - Right ovary: Benign serous cyst      - Left ovary: Corpora albicantia  - Bilateral fallopian tubes: No histologic abnormality  Electronically signed by Celeste Verduzco MD on 1/26/2018 at  2:28 PM   Comments: This is an appended report  These results have been appended to a previously preliminary verified report  Comments: This is an appended report  These results have been appended to a previously preliminary verified report  Note   Grossly a 2 cm endometrial polyp was identified  The entire polyp as well as endometrium are submitted for   histologic evaluation  Simple and focal atypical complex hyperplasia are seen focally involving the polyp as   well as focally the adjacent endometrium  No definite carcinoma is identified      Intradepartmental consultation is in agreement      Interpretation performed at [facility]      Comments: This is an appended report  These results have been appended to a previously preliminary verified report

## 2018-02-01 NOTE — ASSESSMENT & PLAN NOTE
Persistent post-operative pain surrounding her umbilicus and mid-abdomen  No improvement in pain  Continues to require narcotic therapy as well as NSAIDs  Physical exam benign  Plan for CT abd/pelvis as well as cbc/diff and CMP  Prescribed additional prescription for norco 5/325 #20/0, as the patient felt percocet was too strong  The patient will call the office immediately with worsening pain, n/v or fever

## 2018-02-01 NOTE — ASSESSMENT & PLAN NOTE
S/p robotic TLH, BSO on 1/23/18 for complex atypical hyperplasia of the endometrium  No malignancy identified  Counseled to refrain from heavy lifting, baths, vaginal intercourse       Return to the office in 4 weeks for continued post-operative evaluation and vaginal cuff exam

## 2018-02-14 LAB
ALBUMIN SERPL-MCNC: 4.3 G/DL (ref 3.6–5.1)
ALBUMIN/GLOB SERPL: 1.5 (CALC) (ref 1–2.5)
ALP SERPL-CCNC: 62 U/L (ref 33–130)
ALT SERPL-CCNC: 16 U/L (ref 6–29)
AST SERPL-CCNC: 16 U/L (ref 10–35)
BASOPHILS # BLD AUTO: 86 CELLS/UL (ref 0–200)
BASOPHILS NFR BLD AUTO: 1.1 %
BILIRUB SERPL-MCNC: 0.2 MG/DL (ref 0.2–1.2)
BUN SERPL-MCNC: 10 MG/DL (ref 7–25)
BUN/CREAT SERPL: ABNORMAL (CALC) (ref 6–22)
CALCIUM SERPL-MCNC: 9.5 MG/DL (ref 8.6–10.4)
CHLORIDE SERPL-SCNC: 106 MMOL/L (ref 98–110)
CO2 SERPL-SCNC: 27 MMOL/L (ref 20–31)
CREAT SERPL-MCNC: 0.76 MG/DL (ref 0.5–0.99)
EOSINOPHIL # BLD AUTO: 242 CELLS/UL (ref 15–500)
EOSINOPHIL NFR BLD AUTO: 3.1 %
ERYTHROCYTE [DISTWIDTH] IN BLOOD BY AUTOMATED COUNT: 12.7 % (ref 11–15)
GLOBULIN SER CALC-MCNC: 2.9 G/DL (CALC) (ref 1.9–3.7)
GLUCOSE SERPL-MCNC: 125 MG/DL (ref 65–99)
HCT VFR BLD AUTO: 36.2 % (ref 35–45)
HGB BLD-MCNC: 12 G/DL (ref 11.7–15.5)
LYMPHOCYTES # BLD AUTO: 1981 CELLS/UL (ref 850–3900)
LYMPHOCYTES NFR BLD AUTO: 25.4 %
MCH RBC QN AUTO: 28.7 PG (ref 27–33)
MCHC RBC AUTO-ENTMCNC: 33.1 G/DL (ref 32–36)
MCV RBC AUTO: 86.6 FL (ref 80–100)
MONOCYTES # BLD AUTO: 764 CELLS/UL (ref 200–950)
MONOCYTES NFR BLD AUTO: 9.8 %
NEUTROPHILS # BLD AUTO: 4727 CELLS/UL (ref 1500–7800)
NEUTROPHILS NFR BLD AUTO: 60.6 %
PLATELET # BLD AUTO: 327 THOUSAND/UL (ref 140–400)
PMV BLD REES-ECKER: 10.8 FL (ref 7.5–12.5)
POTASSIUM SERPL-SCNC: 4.1 MMOL/L (ref 3.5–5.3)
PROT SERPL-MCNC: 7.2 G/DL (ref 6.1–8.1)
RBC # BLD AUTO: 4.18 MILLION/UL (ref 3.8–5.1)
SL AMB EGFR AFRICAN AMERICAN: 97 ML/MIN/1.73M2
SL AMB EGFR NON AFRICAN AMERICAN: 84 ML/MIN/1.73M2
SODIUM SERPL-SCNC: 142 MMOL/L (ref 135–146)
WBC # BLD AUTO: 7.8 THOUSAND/UL (ref 3.8–10.8)

## 2018-02-21 ENCOUNTER — HOSPITAL ENCOUNTER (OUTPATIENT)
Dept: CT IMAGING | Facility: HOSPITAL | Age: 63
Discharge: HOME | End: 2018-02-21
Payer: COMMERCIAL

## 2018-02-21 DIAGNOSIS — G89.18 POST-OPERATIVE PAIN: ICD-10-CM

## 2018-02-21 PROCEDURE — 74177 CT ABD & PELVIS W/CONTRAST: CPT

## 2018-02-21 RX ADMIN — IOHEXOL 100 ML: 350 INJECTION, SOLUTION INTRAVENOUS at 08:54

## 2018-03-01 ENCOUNTER — OFFICE VISIT (OUTPATIENT)
Dept: GYNECOLOGIC ONCOLOGY | Facility: CLINIC | Age: 63
End: 2018-03-01

## 2018-03-01 VITALS
HEIGHT: 68 IN | DIASTOLIC BLOOD PRESSURE: 82 MMHG | SYSTOLIC BLOOD PRESSURE: 162 MMHG | HEART RATE: 78 BPM | TEMPERATURE: 98.2 F | WEIGHT: 242 LBS | RESPIRATION RATE: 18 BRPM | BODY MASS INDEX: 36.68 KG/M2

## 2018-03-01 DIAGNOSIS — Z98.890 POSTOPERATIVE STATE: ICD-10-CM

## 2018-03-01 DIAGNOSIS — R30.0 DYSURIA: Primary | ICD-10-CM

## 2018-03-01 DIAGNOSIS — R93.5 ABNORMAL CT OF THE ABDOMEN: ICD-10-CM

## 2018-03-01 PROCEDURE — 99024 POSTOP FOLLOW-UP VISIT: CPT | Performed by: PHYSICIAN ASSISTANT

## 2018-03-01 RX ORDER — ZOLPIDEM TARTRATE 5 MG/1
TABLET ORAL
COMMUNITY
Start: 2018-02-27 | End: 2019-07-27

## 2018-03-01 NOTE — ASSESSMENT & PLAN NOTE
Hazy small bowel mesentery without associated lymphadenopathy on post-op CT scan  Plan for repeat in 6 months to document stability

## 2018-03-01 NOTE — ASSESSMENT & PLAN NOTE
S/p robotic TLH, BSO on 1/23/18 for complex atypical hyperplasia  No malignancy identified  Post-op pain resolved  Vaginal cuff is well healed  New dysuria  Check urinalysis with C&S  The patient will return to our office on an as needed basis  Follow-up with Dr Hollis Gasca for routine gynecologic care

## 2018-03-01 NOTE — PROGRESS NOTES
Assessment/Plan:    Problem List Items Addressed This Visit        Other    Postoperative state     S/p robotic TLH, BSO on 1/23/18 for complex atypical hyperplasia  No malignancy identified  Post-op pain resolved  Vaginal cuff is well healed  New dysuria  Check urinalysis with C&S  The patient will return to our office on an as needed basis  Follow-up with Dr Fran Forde for routine gynecologic care  Abnormal CT of the abdomen     Hazy small bowel mesentery without associated lymphadenopathy on post-op CT scan  Plan for repeat in 6 months to document stability  Relevant Orders    CT abdomen pelvis w contrast    BUN    Creatinine, serum      Other Visit Diagnoses     Dysuria    -  Primary    Relevant Orders    UA (URINE) with reflex to Microscopic            CHIEF COMPLAINT:  Post-operative evaluation     Problem:  1  Thickened endometrium, complex atypical hyperplasia  2  Abnormal CT abdomen on 2/21/18 with hazy appearance of small bowel mesentery without associated lymphadenopathy  Previous therapy:  1  Robotic assisted total laparoscopic hysterectomy, bilateral salpingo-oophorectomy and cystoscopy on 1/23/2018  A  Simplex and focal complex atypical hyperplasia involving a polyp  No malignancy  Patient ID: Nena Cash is a 58 y o  female  who presents to the office for continued post-operative evaluation  She has been afebrile  The patient is moving her bowels without difficulty  She notes new onset dysuria  The patient notes her post-operative abdominal pain has completely resolved  She denies vaginal bleeding  She underwent CT imaging on 2/21/18 to evaluate post-operative pain  No post-operative complications noted  Hazy small bowel mesentery noted, which was non-specific  The following portions of the patient's history were reviewed and updated as appropriate: allergies, current medications and problem list     Review of Systems   Constitutional: Negative      Respiratory: Negative  Cardiovascular: Negative  Gastrointestinal: Negative  Genitourinary: Positive for dysuria  Negative for vaginal bleeding  Skin: Negative  Current Outpatient Prescriptions:     fenofibrate (TRICOR) 145 mg tablet, Take 145 mg by mouth daily, Disp: , Rfl:     HYDROcodone-acetaminophen (NORCO) 5-325 mg per tablet, Take 1 tablet PO every 4-6 hours prn post-operative pain, Disp: 20 tablet, Rfl: 0    ibuprofen (MOTRIN) 200 mg tablet, Take 3 tablets by mouth every 6 (six) hours as needed for mild pain, Disp: , Rfl: 0    LUTEIN PO, Take by mouth daily, Disp: , Rfl:     omeprazole (PriLOSEC) 20 mg delayed release capsule, Take 20 mg by mouth daily, Disp: , Rfl:     zolpidem (AMBIEN) 5 mg tablet, , Disp: , Rfl:     Objective:    Blood pressure 162/82, pulse 78, temperature 98 2 °F (36 8 °C), resp  rate 18, height 5' 8" (1 727 m), weight 110 kg (242 lb)  Body mass index is 36 8 kg/m²  Body surface area is 2 22 meters squared  Physical Exam   Constitutional: She is oriented to person, place, and time  She appears well-developed and well-nourished  Pulmonary/Chest: Effort normal    Abdominal: Soft  She exhibits no mass  There is no tenderness  Genitourinary:   Genitourinary Comments: The external female genitalia is normal  The urethral meatus is normal  Speculum exam reveals a grossly normal vagina  The vaginal cuff is intact  No active bleeding or discharge  Neurological: She is alert and oriented to person, place, and time  Skin: Skin is warm and dry  No rash noted  Surgical trocar sites are well healed  Psychiatric: She has a normal mood and affect  Her behavior is normal  Thought content normal        IMAGING:     Study Result     CT ABDOMEN AND PELVIS WITH IV CONTRAST     INDICATION:  Pain status post hysterectomy  G89 18: Other acute postprocedural pain     COMPARISON: None      TECHNIQUE:  CT examination of the abdomen and pelvis was performed   Axial, sagittal, and coronal 2D reformatted images were created from the source data and submitted for interpretation      Radiation dose length product (DLP) for this visit:  1183 mGy-cm   This examination, like all CT scans performed in the [facility], was performed utilizing techniques to minimize radiation dose exposure, including the use of iterative   reconstruction and automated exposure control      IV Contrast:  100 mL of iohexol (OMNIPAQUE)  Enteric Contrast:  Enteric contrast was administered      FINDINGS:     ABDOMEN     LOWER CHEST:  There is a small hiatal hernia      LIVER/BILIARY TREE:  There is mild hepatic steatosis      GALLBLADDER:  There are gallstone(s) within the gallbladder, without pericholecystic inflammatory changes      SPLEEN:  Unremarkable      PANCREAS:  Unremarkable      ADRENAL GLANDS:  Unremarkable      KIDNEYS/URETERS:  Unremarkable  No hydronephrosis      STOMACH AND BOWEL:  Unremarkable      APPENDIX:  No findings to suggest appendicitis      ABDOMINOPELVIC CAVITY:  No ascites or free intraperitoneal air  No lymphadenopathy  There is mild haziness in the small bowel mesentery without lymphadenopathy      VESSELS:  Unremarkable for patient's age      PELVIS     REPRODUCTIVE ORGANS:  Unremarkable for patient's age      URINARY BLADDER:  Unremarkable      ABDOMINAL WALL/INGUINAL REGIONS:  Unremarkable      OSSEOUS STRUCTURES:  No acute fracture or destructive osseous lesion      IMPRESSION:     1  Unremarkable post hysterectomy appearance without acute abnormality      2  Cholelithiasis      3  Hepatic steatosis      4    Hazy appearance of the small bowel mesentery without associated lymphadenopathy  This is nonspecific and 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    However, early lymphoma can have a similar appearance and follow-up CT in 6 months is recommended   to confirm stability            Workstation performed: [id number]      Imaging     CT abdomen pelvis w contrast (Order #[id number]) on 2/21/2018 - Imaging Information   Result History     CT abdomen pelvis w contrast (Order #[id number]) on 2/25/2018 - Order Result History Report   Result Notes     Notes Recorded by Zack Reinoso PA-C on 2/27/2018 at 3:43 PM EST  Reviewed with NT  Plan to f/u imaging  LMOM for patient 2/27/18, 1543   ------    Notes Recorded by Alejandro Beck MD on 2/26/2018 at 7:23 PM EST  Lizabeth mesentery  Is she on any NSAIDs?  Yes, we should follow it up            Signed by     Signed Date/Time  Phone Pager   [facility], [address] [place] [address] 2/25/2018 12:00 [phone]

## 2018-03-19 ENCOUNTER — HOSPITAL ENCOUNTER (OUTPATIENT)
Dept: CT IMAGING | Facility: HOSPITAL | Age: 63
Discharge: HOME | End: 2018-03-19
Payer: COMMERCIAL

## 2018-03-19 DIAGNOSIS — R10.84 ABDOMINAL PAIN, ACUTE, GENERALIZED: ICD-10-CM

## 2018-03-19 DIAGNOSIS — R10.84 ABDOMINAL PAIN, ACUTE, GENERALIZED: Primary | ICD-10-CM

## 2018-03-19 PROCEDURE — 74177 CT ABD & PELVIS W/CONTRAST: CPT

## 2018-03-19 RX ADMIN — IOHEXOL 50 ML: 240 INJECTION, SOLUTION INTRATHECAL; INTRAVASCULAR; INTRAVENOUS; ORAL at 12:00

## 2018-03-19 RX ADMIN — IOHEXOL 100 ML: 350 INJECTION, SOLUTION INTRAVENOUS at 13:55

## 2018-03-20 DIAGNOSIS — K65.4 MESENTERIC PANNICULITIS (HCC): Primary | ICD-10-CM

## 2018-03-20 DIAGNOSIS — R10.84 DIFFUSE ABDOMINAL PAIN: ICD-10-CM

## 2018-03-21 LAB
ALBUMIN SERPL BCP-MCNC: 4.1 G/DL (ref 3.5–5.7)
ALP SERPL-CCNC: 54 IU/L (ref 55–165)
ALT SERPL W P-5'-P-CCNC: 38 IU/L (ref 10–30)
AMYLASE (HISTORICAL): 23 U/L (ref 29–103)
ANION GAP SERPL CALCULATED.3IONS-SCNC: 14 MM/L
AST SERPL W P-5'-P-CCNC: 34 U/L (ref 7–26)
BACTERIA UR QL AUTO: ABNORMAL /HPF
BASOPHILS # BLD AUTO: 0.1 X3/UL (ref 0–0.3)
BASOPHILS # BLD AUTO: 1.1 % (ref 0–2)
BILIRUB SERPL-MCNC: 0.4 MG/DL (ref 0.3–1)
BILIRUB UR QL STRIP: NEGATIVE
BUN SERPL-MCNC: 14 MG/DL (ref 7–25)
CALCIUM SERPL-MCNC: 9.1 MG/DL (ref 8.6–10.5)
CHLORIDE SERPL-SCNC: 108 MM/L (ref 98–107)
CLARITY UR: CLEAR
CO2 SERPL-SCNC: 25 MM/L (ref 21–31)
COLOR UR: YELLOW
CREAT SERPL-MCNC: 0.67 MG/DL (ref 0.6–1.2)
DEPRECATED RDW RBC AUTO: 13.9 %
EGFR (HISTORICAL): > 60 GFR
EGFR AFRICAN AMERICAN (HISTORICAL): > 60 GFR
EOSINOPHIL # BLD AUTO: 0.2 X3/UL (ref 0–0.5)
EOSINOPHIL NFR BLD AUTO: 2.7 % (ref 0–5)
GLUCOSE (HISTORICAL): 77 MG/DL (ref 65–99)
GLUCOSE UR STRIP-MCNC: NEGATIVE MG/DL
HCT VFR BLD AUTO: 38.7 % (ref 37–47)
HGB BLD-MCNC: 12.8 G/DL (ref 12–16)
HGB UR QL STRIP.AUTO: NEGATIVE
HYALINE CASTS #/AREA URNS LPF: ABNORMAL /LPF
KETONES UR STRIP-MCNC: NEGATIVE MG/DL
LEUKOCYTE ESTERASE UR QL STRIP: ABNORMAL
LIPASE SERPL-CCNC: 34 U/L (ref 11–82)
LYMPHOCYTES # BLD AUTO: 1.5 X3/UL (ref 1.2–4.2)
LYMPHOCYTES NFR BLD AUTO: 25.3 % (ref 20.5–51.1)
MAGNESIUM SERPL-MCNC: 2 MG/DL (ref 1.9–2.7)
MCH RBC QN AUTO: 28.1 PG (ref 26–34)
MCHC RBC AUTO-ENTMCNC: 33.1 G/DL (ref 31–37)
MCV RBC AUTO: 85 FL (ref 81–99)
MONOCYTES # BLD AUTO: 0.8 X3/UL (ref 0–1)
MONOCYTES NFR BLD AUTO: 14 % (ref 1.7–12)
MUCUS THREADS (HISTORICAL): PRESENT
NEUTROPHILS # BLD AUTO: 3.4 X3/UL (ref 1.4–6.5)
NEUTS SEG NFR BLD AUTO: 56.9 % (ref 42.2–75.2)
NITRITE UR QL STRIP: NEGATIVE
NON-SQ EPI CELLS URNS QL MICRO: ABNORMAL /LPF
OSMOLALITY, SERUM (HISTORICAL): 284 MOSM (ref 262–291)
PH UR STRIP.AUTO: 5.5 [PH] (ref 4.5–8)
PHOSPHATE SERPL-MCNC: 3.2 MG/DL (ref 3–5.5)
PLATELET # BLD AUTO: 296 X3/UL (ref 130–400)
PMV BLD AUTO: 9.4 FL
POTASSIUM SERPL-SCNC: 4 MM/L (ref 3.5–5.5)
PROT UR STRIP-MCNC: NEGATIVE MG/DL
RBC # BLD AUTO: 4.55 X6/UL (ref 3.9–5.2)
RBC #/AREA URNS AUTO: ABNORMAL /HPF
SODIUM SERPL-SCNC: 143 MM/L (ref 134–143)
SP GR UR STRIP.AUTO: 1.02 (ref 1–1.03)
TOTAL PROTEIN (HISTORICAL): 7.1 G/DL (ref 6.4–8.9)
UROBILINOGEN UR QL STRIP.AUTO: 0.2 EU/DL (ref 0.2–8)
WBC # BLD AUTO: 6 X3/UL (ref 4.8–10.8)
WBC #/AREA URNS AUTO: ABNORMAL /HPF
YEAST (HISTORICAL): ABNORMAL

## 2018-03-22 LAB
C-REACTIVE PROTEIN (HISTORICAL): 9.9 MG/L
ERYTHROCYTE SEDIMENTATION RATE (HISTORICAL): 22 MM/HR

## 2018-06-01 ENCOUNTER — OFFICE VISIT (OUTPATIENT)
Dept: PHYSICAL THERAPY | Facility: CLINIC | Age: 63
End: 2018-06-01
Payer: COMMERCIAL

## 2018-09-07 ENCOUNTER — HOSPITAL ENCOUNTER (OUTPATIENT)
Dept: CT IMAGING | Facility: HOSPITAL | Age: 63
Discharge: HOME | End: 2018-09-07
Payer: COMMERCIAL

## 2018-09-07 DIAGNOSIS — R93.5 ABNORMAL CT OF THE ABDOMEN: ICD-10-CM

## 2018-09-07 PROCEDURE — 74177 CT ABD & PELVIS W/CONTRAST: CPT

## 2018-09-07 RX ADMIN — IOHEXOL 100 ML: 350 INJECTION, SOLUTION INTRAVENOUS at 19:45

## 2018-09-07 RX ADMIN — IOHEXOL 50 ML: 240 INJECTION, SOLUTION INTRATHECAL; INTRAVASCULAR; INTRAVENOUS; ORAL at 19:45

## 2018-10-08 ENCOUNTER — OFFICE VISIT (OUTPATIENT)
Dept: OBGYN CLINIC | Facility: MEDICAL CENTER | Age: 63
End: 2018-10-08
Payer: COMMERCIAL

## 2018-10-08 VITALS — DIASTOLIC BLOOD PRESSURE: 68 MMHG | SYSTOLIC BLOOD PRESSURE: 124 MMHG | WEIGHT: 241 LBS | BODY MASS INDEX: 36.64 KG/M2

## 2018-10-08 DIAGNOSIS — N89.8 VAGINAL IRRITATION: ICD-10-CM

## 2018-10-08 DIAGNOSIS — B96.89 BACTERIAL VAGINITIS: Primary | ICD-10-CM

## 2018-10-08 DIAGNOSIS — N76.0 BACTERIAL VAGINITIS: Primary | ICD-10-CM

## 2018-10-08 PROCEDURE — 87660 TRICHOMONAS VAGIN DIR PROBE: CPT | Performed by: PHYSICIAN ASSISTANT

## 2018-10-08 PROCEDURE — 99214 OFFICE O/P EST MOD 30 MIN: CPT | Performed by: PHYSICIAN ASSISTANT

## 2018-10-08 PROCEDURE — 87510 GARDNER VAG DNA DIR PROBE: CPT | Performed by: PHYSICIAN ASSISTANT

## 2018-10-08 PROCEDURE — 87480 CANDIDA DNA DIR PROBE: CPT | Performed by: PHYSICIAN ASSISTANT

## 2018-10-08 RX ORDER — METRONIDAZOLE 7.5 MG/G
1 GEL VAGINAL
Qty: 70 G | Refills: 0 | Status: SHIPPED | OUTPATIENT
Start: 2018-10-08 | End: 2018-10-13

## 2018-10-08 NOTE — ASSESSMENT & PLAN NOTE
BV on wet mount, affirm sent out r/o co-infection of yeast  rx metrogel sent via EMR  Call or RTO if symptoms worsen or do not improve

## 2018-10-08 NOTE — PROGRESS NOTES
ASSESSMENT/PLAN:  Problem List Items Addressed This Visit     Bacterial vaginitis - Primary     BV on wet mount, affirm sent out r/o co-infection of yeast  rx metrogel sent via EMR  Call or RTO if symptoms worsen or do not improve         Relevant Medications    metroNIDAZOLE (METROGEL) 0 75 % vaginal gel      Other Visit Diagnoses     Vaginal irritation        Relevant Orders    VAGINOSIS DNA PROBE (AFFIRM)          SUBJECTIVE:   Yelena Portillo is a 61 y o  female who presents for evaluation of vaginal symptoms of burning and itching  Symptoms have been present for 2 weeks  Symptoms include burning, local irritation and vulvar itching  Patient denies discharge, odor, fever, chills, dysuria, or abdominal pain  She was on amoxil last week for UTI but symptoms started before that  She was on lotrisone in the past with some relief then on osphena but dx with endometrial hyperplasia with atypia so Dr Madan Colindres discontinues  She s/p robotic assisted MANNY 1/2018  She denies any aggravating or alleviating factors and has not used any OTC products  The following portions of the patient's history were reviewed and updated as appropriate: allergies, current medications, past family history, past medical history, past social history, past surgical history and problem list     REVIEW OF SYSTEMS:  Review of Systems   Constitutional: Negative for chills and fever  Gastrointestinal: Negative for abdominal pain  Genitourinary: Positive for vaginal pain  Negative for dysuria, genital sores, vaginal bleeding and vaginal discharge  OBJECTIVE:  /68 (BP Location: Right arm)   Wt 109 kg (241 lb)   BMI 36 64 kg/m²     Physical Exam   Constitutional: She is oriented to person, place, and time  She appears well-developed and well-nourished  No distress  Abdominal: Soft  She exhibits no distension  There is no tenderness  Genitourinary: There is no rash, tenderness or lesion on the right labia   There is no rash, tenderness or lesion on the left labia  No bleeding in the vagina  Vaginal discharge (thin white d/c, some clue cells on wet mount, no yeast, pH 6 0; affirm sent out) found  Neurological: She is alert and oriented to person, place, and time  Psychiatric: She has a normal mood and affect

## 2018-10-09 LAB
CANDIDA RRNA VAG QL PROBE: NEGATIVE
G VAGINALIS RRNA GENITAL QL PROBE: NEGATIVE
T VAGINALIS RRNA GENITAL QL PROBE: NEGATIVE

## 2018-10-16 ENCOUNTER — TELEPHONE (OUTPATIENT)
Dept: OBGYN CLINIC | Facility: MEDICAL CENTER | Age: 63
End: 2018-10-16

## 2018-10-16 NOTE — TELEPHONE ENCOUNTER
Called pt back - advised her since the affirm was negative to use the cream that was prescribed to her - will take a month of using to feel the relief   Advised advised her melvin recommended she use Desitin cream or Lotrisone - stated she will try the Lotrisone cream

## 2018-10-16 NOTE — TELEPHONE ENCOUNTER
Patient daughter Garth Poisson called - pt is stating that her "vagina is on fire"  Advised pt daughter that pt could be seen in a ER or urgent care  Tried to call pt back - it went to [facility] for pt to cb       Ext: [phone]

## 2018-10-16 NOTE — TELEPHONE ENCOUNTER
Affirm was negative for BV and yeast, likely from atrophy rec estrogen cream but will take a month of using for benefit, rec desitin or lotrisone cream until it works

## 2018-10-16 NOTE — TELEPHONE ENCOUNTER
Pt called stating she used the Metrogel that was prescribed to her on 10/08  She said her bacterial vaginitis still never went away  Please advise

## 2018-10-17 ENCOUNTER — TELEPHONE (OUTPATIENT)
Dept: OBGYN CLINIC | Facility: CLINIC | Age: 63
End: 2018-10-17

## 2018-10-17 ENCOUNTER — TELEPHONE (OUTPATIENT)
Dept: OBGYN CLINIC | Facility: MEDICAL CENTER | Age: 63
End: 2018-10-17

## 2018-10-17 DIAGNOSIS — N95.2 ATROPHIC VAGINITIS: Primary | ICD-10-CM

## 2018-10-17 NOTE — TELEPHONE ENCOUNTER
There is another note about this - I was unaware - I did personally speak with LS  PT HAD COMPLEX ENDOMETRIAL HYPERPLASIA AND HAD A HYSTERECTOMY WITH Kyle Daily 1 YR AGO   cAN SHE EVEN HAVE ESTRACE CREAM

## 2018-10-17 NOTE — TELEPHONE ENCOUNTER
There are 2 tasks on this pt  I spoke to pt for a long time  She was crying and said this is ruining her life  She cannot have sex and  very unhappy  Pt said she is exactly the same - sx wise - as before she used the metronidazole  I spoke with LS - pt may use estrogen cream - 1 gm hs for 2 weeks and then 2 to 3 x wk  Use coconut oil as lubricant and desitin on outside as lotrisone too drying  I am to call daughter Linda Alaniz about [phone]  This pt saw a previous doc and he did not help her at all  I spoke with daughter ( pt said to call her) and explained in detail about how to use estrace cream , desitin on outside and coconut oil  No sex for 2 weeks  Daughter also said pt did try estrace cream over 1 yr ago - no help  I suggested to daughter, Mom might want to see pcp for depression

## 2018-10-17 NOTE — TELEPHONE ENCOUNTER
Patient's daughter called and had questions about a prescription for vaginal get prescribed for the patient  Patient is still having a burning sensation  Patient states she was told to use Desitin, but does not want to put Desitin in her vagina area

## 2018-10-18 DIAGNOSIS — N95.2 ATROPHIC VAGINITIS: Primary | ICD-10-CM

## 2018-10-18 RX ORDER — ESTRADIOL 0.1 MG/G
CREAM VAGINAL
Qty: 42.5 G | Refills: 3 | Status: ON HOLD | OUTPATIENT
Start: 2018-10-18 | End: 2019-01-19

## 2018-10-18 NOTE — TELEPHONE ENCOUNTER
Pts daughter called - no rx at pharm for estrace vag cream  I called in estrace vag cream, 1 tube 42 5 gm sig 1 gm hs into vagina every night for 2 weeks, then 1 gm hs 2 to 3 x wk thereafter  I tube and 3 refills

## 2018-10-23 RX ORDER — ESTRADIOL 0.1 MG/G
CREAM VAGINAL
Qty: 42.5 G | Refills: 2 | Status: ON HOLD | OUTPATIENT
Start: 2018-10-23 | End: 2019-01-19

## 2018-11-29 ENCOUNTER — OFFICE VISIT (OUTPATIENT)
Dept: OBGYN CLINIC | Facility: MEDICAL CENTER | Age: 63
End: 2018-11-29
Payer: COMMERCIAL

## 2018-11-29 VITALS
RESPIRATION RATE: 14 BRPM | DIASTOLIC BLOOD PRESSURE: 80 MMHG | SYSTOLIC BLOOD PRESSURE: 140 MMHG | HEIGHT: 68 IN | WEIGHT: 237 LBS | BODY MASS INDEX: 35.92 KG/M2

## 2018-11-29 DIAGNOSIS — N95.2 ATROPHIC VAGINITIS: Primary | ICD-10-CM

## 2018-11-29 PROCEDURE — 99213 OFFICE O/P EST LOW 20 MIN: CPT | Performed by: OBSTETRICS & GYNECOLOGY

## 2018-11-29 RX ORDER — ESTRADIOL 0.1 MG/G
1 CREAM VAGINAL 2 TIMES WEEKLY
Qty: 42.5 G | Refills: 3 | Status: SHIPPED | OUTPATIENT
Start: 2018-11-29 | End: 2020-01-30

## 2018-11-29 NOTE — PROGRESS NOTES
Assessment/Plan:      Diagnoses and all orders for this visit:    Atrophic vaginitis  -     estradiol (ESTRACE) 0 1 mg/g vaginal cream; Insert 1 g into the vagina 2 (two) times a week          Subjective:     Patient ID: Reina Gillespie is a 61 y o  female  Patient is a 59-year-old female who recently underwent a robotic hysterectomy for endometrial atypia on an endometrial biopsy  Patient stopped taking Shirlie Maria Alejandra with her diagnosis  Patient also recently was seen for vaginal irritation and affirm was done which was negative  Patient complains of vaginal burning  Patient has a history of mesh placed in her anterior compartment of her vagina  Vaginal Pain         Review of Systems   Genitourinary: Positive for vaginal pain  Negative for vaginal bleeding  Objective:     Physical Exam   Genitourinary:   Genitourinary Comments: Vaginal erythema, loss of rugae,  no erosions noted

## 2019-01-19 ENCOUNTER — APPOINTMENT (EMERGENCY)
Dept: RADIOLOGY | Facility: HOSPITAL | Age: 64
DRG: 195 | End: 2019-01-19
Payer: COMMERCIAL

## 2019-01-19 ENCOUNTER — HOSPITAL ENCOUNTER (EMERGENCY)
Facility: HOSPITAL | Age: 64
DRG: 195 | End: 2019-01-19
Attending: FAMILY MEDICINE | Admitting: FAMILY MEDICINE
Payer: COMMERCIAL

## 2019-01-19 ENCOUNTER — APPOINTMENT (EMERGENCY)
Dept: CT IMAGING | Facility: HOSPITAL | Age: 64
DRG: 195 | End: 2019-01-19
Payer: COMMERCIAL

## 2019-01-19 ENCOUNTER — HOSPITAL ENCOUNTER (INPATIENT)
Facility: HOSPITAL | Age: 64
LOS: 6 days | Discharge: HOME | DRG: 195 | End: 2019-01-25
Attending: FAMILY MEDICINE | Admitting: INTERNAL MEDICINE
Payer: COMMERCIAL

## 2019-01-19 VITALS
HEART RATE: 70 BPM | SYSTOLIC BLOOD PRESSURE: 102 MMHG | RESPIRATION RATE: 17 BRPM | DIASTOLIC BLOOD PRESSURE: 41 MMHG | OXYGEN SATURATION: 98 % | TEMPERATURE: 100 F | HEIGHT: 68 IN | WEIGHT: 240 LBS | BODY MASS INDEX: 36.37 KG/M2

## 2019-01-19 DIAGNOSIS — K52.9 GASTROENTERITIS: ICD-10-CM

## 2019-01-19 DIAGNOSIS — J18.9 PNEUMONIA DUE TO INFECTIOUS ORGANISM: Primary | ICD-10-CM

## 2019-01-19 DIAGNOSIS — J18.9 PNEUMONIA: Primary | ICD-10-CM

## 2019-01-19 DIAGNOSIS — R53.1 WEAKNESS: ICD-10-CM

## 2019-01-19 LAB
ANION GAP SERPL CALCULATED.3IONS-SCNC: 11 MMOL/L (ref 4–13)
BASOPHILS # BLD AUTO: 0.1 THOUSANDS/ΜL (ref 0–0.1)
BASOPHILS NFR BLD AUTO: 1 % (ref 0–1)
BUN SERPL-MCNC: 21 MG/DL (ref 7–25)
CALCIUM SERPL-MCNC: 8.8 MG/DL (ref 8.6–10.5)
CHLORIDE SERPL-SCNC: 105 MMOL/L (ref 98–107)
CO2 SERPL-SCNC: 24 MMOL/L (ref 21–31)
CREAT SERPL-MCNC: 1.07 MG/DL (ref 0.6–1.2)
EOSINOPHIL # BLD AUTO: 0.1 THOUSAND/ΜL (ref 0–0.61)
EOSINOPHIL NFR BLD AUTO: 1 % (ref 0–6)
ERYTHROCYTE [DISTWIDTH] IN BLOOD BY AUTOMATED COUNT: 15.1 % (ref 11.6–15.1)
FLUAV AG SPEC QL IA: NEGATIVE
FLUAV AG SPEC QL: NORMAL
FLUBV AG SPEC QL IA: NEGATIVE
FLUBV AG SPEC QL: NORMAL
GFR SERPL CREATININE-BSD FRML MDRD: 55 ML/MIN/1.73SQ M
GLUCOSE SERPL-MCNC: 109 MG/DL (ref 65–140)
HCT VFR BLD AUTO: 39 % (ref 37–47)
HGB BLD-MCNC: 12.7 G/DL (ref 11.5–15.4)
INR PPP: 1.28 (ref 0.9–1.5)
L PNEUMO1 AG UR QL IA.RAPID: NEGATIVE
LACTATE SERPL-SCNC: 1.1 MMOL/L (ref 0.5–2)
LIPASE SERPL-CCNC: 13 U/L (ref 11–82)
LYMPHOCYTES # BLD AUTO: 0.8 THOUSANDS/ΜL (ref 0.6–4.47)
LYMPHOCYTES NFR BLD AUTO: 7 % (ref 14–44)
MCH RBC QN AUTO: 27.6 PG (ref 26.8–34.3)
MCHC RBC AUTO-ENTMCNC: 32.6 G/DL (ref 31.4–37.4)
MCV RBC AUTO: 85 FL (ref 82–98)
MONOCYTES # BLD AUTO: 1.1 THOUSAND/ΜL (ref 0.17–1.22)
MONOCYTES NFR BLD AUTO: 10 % (ref 4–12)
NEUTROPHILS # BLD AUTO: 9.9 THOUSANDS/ΜL (ref 1.85–7.62)
NEUTS SEG NFR BLD AUTO: 83 % (ref 43–75)
NRBC BLD AUTO-RTO: 0 /100 WBCS
PLATELET # BLD AUTO: 256 THOUSANDS/UL (ref 149–390)
PMV BLD AUTO: 8.8 FL (ref 8.9–12.7)
POTASSIUM SERPL-SCNC: 3.6 MMOL/L (ref 3.5–5.5)
PROTHROMBIN TIME: 14.7 SECONDS (ref 10.2–13)
RBC # BLD AUTO: 4.61 MILLION/UL (ref 3.81–5.12)
RSV B RNA SPEC QL NAA+PROBE: NORMAL
SODIUM SERPL-SCNC: 140 MMOL/L (ref 134–143)
WBC # BLD AUTO: 12 THOUSAND/UL (ref 4.31–10.16)

## 2019-01-19 PROCEDURE — C9113 INJ PANTOPRAZOLE SODIUM, VIA: HCPCS | Performed by: FAMILY MEDICINE

## 2019-01-19 PROCEDURE — 87493 C DIFF AMPLIFIED PROBE: CPT | Performed by: FAMILY MEDICINE

## 2019-01-19 PROCEDURE — 87631 RESP VIRUS 3-5 TARGETS: CPT | Performed by: FAMILY MEDICINE

## 2019-01-19 PROCEDURE — 96365 THER/PROPH/DIAG IV INF INIT: CPT

## 2019-01-19 PROCEDURE — 83605 ASSAY OF LACTIC ACID: CPT | Performed by: FAMILY MEDICINE

## 2019-01-19 PROCEDURE — 87449 NOS EACH ORGANISM AG IA: CPT | Performed by: FAMILY MEDICINE

## 2019-01-19 PROCEDURE — 96361 HYDRATE IV INFUSION ADD-ON: CPT

## 2019-01-19 PROCEDURE — 80048 BASIC METABOLIC PNL TOTAL CA: CPT | Performed by: FAMILY MEDICINE

## 2019-01-19 PROCEDURE — 83690 ASSAY OF LIPASE: CPT | Performed by: FAMILY MEDICINE

## 2019-01-19 PROCEDURE — 74177 CT ABD & PELVIS W/CONTRAST: CPT

## 2019-01-19 PROCEDURE — 87040 BLOOD CULTURE FOR BACTERIA: CPT | Performed by: FAMILY MEDICINE

## 2019-01-19 PROCEDURE — 85025 COMPLETE CBC W/AUTO DIFF WBC: CPT | Performed by: FAMILY MEDICINE

## 2019-01-19 PROCEDURE — 96375 TX/PRO/DX INJ NEW DRUG ADDON: CPT

## 2019-01-19 PROCEDURE — 85610 PROTHROMBIN TIME: CPT | Performed by: FAMILY MEDICINE

## 2019-01-19 PROCEDURE — 71045 X-RAY EXAM CHEST 1 VIEW: CPT

## 2019-01-19 PROCEDURE — 36415 COLL VENOUS BLD VENIPUNCTURE: CPT | Performed by: FAMILY MEDICINE

## 2019-01-19 PROCEDURE — 99285 EMERGENCY DEPT VISIT HI MDM: CPT

## 2019-01-19 RX ORDER — LEVOFLOXACIN 5 MG/ML
750 INJECTION, SOLUTION INTRAVENOUS ONCE
Status: COMPLETED | OUTPATIENT
Start: 2019-01-19 | End: 2019-01-19

## 2019-01-19 RX ORDER — PANTOPRAZOLE SODIUM 20 MG/1
20 TABLET, DELAYED RELEASE ORAL
Status: DISCONTINUED | OUTPATIENT
Start: 2019-01-20 | End: 2019-01-19

## 2019-01-19 RX ORDER — GUAIFENESIN 100 MG/5ML
200 SOLUTION ORAL EVERY 4 HOURS PRN
Status: DISCONTINUED | OUTPATIENT
Start: 2019-01-19 | End: 2019-01-25 | Stop reason: HOSPADM

## 2019-01-19 RX ORDER — PANTOPRAZOLE SODIUM 40 MG/1
40 TABLET, DELAYED RELEASE ORAL
Status: DISCONTINUED | OUTPATIENT
Start: 2019-01-20 | End: 2019-01-25 | Stop reason: HOSPADM

## 2019-01-19 RX ORDER — ONDANSETRON 2 MG/ML
4 INJECTION INTRAMUSCULAR; INTRAVENOUS ONCE
Status: COMPLETED | OUTPATIENT
Start: 2019-01-19 | End: 2019-01-19

## 2019-01-19 RX ORDER — LEVOFLOXACIN 5 MG/ML
500 INJECTION, SOLUTION INTRAVENOUS EVERY 24 HOURS
Status: DISCONTINUED | OUTPATIENT
Start: 2019-01-19 | End: 2019-01-19 | Stop reason: SDUPTHER

## 2019-01-19 RX ORDER — NAPROXEN 500 MG/1
500 TABLET ORAL EVERY 12 HOURS SCHEDULED
Status: DISCONTINUED | OUTPATIENT
Start: 2019-01-19 | End: 2019-01-19

## 2019-01-19 RX ORDER — ESTRADIOL 0.1 MG/G
1 CREAM VAGINAL 2 TIMES WEEKLY
Status: DISCONTINUED | OUTPATIENT
Start: 2019-01-20 | End: 2019-01-25 | Stop reason: HOSPADM

## 2019-01-19 RX ORDER — ZOLPIDEM TARTRATE 5 MG/1
5 TABLET ORAL
Status: DISCONTINUED | OUTPATIENT
Start: 2019-01-19 | End: 2019-01-25 | Stop reason: HOSPADM

## 2019-01-19 RX ORDER — PANTOPRAZOLE SODIUM 40 MG/1
40 INJECTION, POWDER, FOR SOLUTION INTRAVENOUS ONCE
Status: COMPLETED | OUTPATIENT
Start: 2019-01-19 | End: 2019-01-19

## 2019-01-19 RX ORDER — LEVOFLOXACIN 5 MG/ML
500 INJECTION, SOLUTION INTRAVENOUS EVERY 24 HOURS
Status: DISCONTINUED | OUTPATIENT
Start: 2019-01-20 | End: 2019-01-25

## 2019-01-19 RX ORDER — DEXTROSE, SODIUM CHLORIDE, AND POTASSIUM CHLORIDE 5; .2; .15 G/100ML; G/100ML; G/100ML
125 INJECTION INTRAVENOUS CONTINUOUS
Status: DISCONTINUED | OUTPATIENT
Start: 2019-01-19 | End: 2019-01-19 | Stop reason: SDUPTHER

## 2019-01-19 RX ORDER — ACETAMINOPHEN 325 MG/1
650 TABLET ORAL EVERY 6 HOURS PRN
Status: DISCONTINUED | OUTPATIENT
Start: 2019-01-19 | End: 2019-01-25 | Stop reason: HOSPADM

## 2019-01-19 RX ORDER — FENOFIBRATE 145 MG/1
145 TABLET, COATED ORAL DAILY
Status: DISCONTINUED | OUTPATIENT
Start: 2019-01-19 | End: 2019-01-25 | Stop reason: HOSPADM

## 2019-01-19 RX ORDER — KETOROLAC TROMETHAMINE 30 MG/ML
30 INJECTION, SOLUTION INTRAMUSCULAR; INTRAVENOUS ONCE
Status: COMPLETED | OUTPATIENT
Start: 2019-01-19 | End: 2019-01-19

## 2019-01-19 RX ORDER — NAPROXEN 500 MG/1
500 TABLET ORAL 2 TIMES DAILY PRN
Status: DISCONTINUED | OUTPATIENT
Start: 2019-01-19 | End: 2019-01-21

## 2019-01-19 RX ORDER — ACETAMINOPHEN 325 MG/1
650 TABLET ORAL ONCE
Status: COMPLETED | OUTPATIENT
Start: 2019-01-19 | End: 2019-01-19

## 2019-01-19 RX ADMIN — ACETAMINOPHEN 650 MG: 325 TABLET ORAL at 23:58

## 2019-01-19 RX ADMIN — LEVOFLOXACIN 750 MG: 5 INJECTION, SOLUTION INTRAVENOUS at 11:31

## 2019-01-19 RX ADMIN — IOHEXOL 100 ML: 350 INJECTION, SOLUTION INTRAVENOUS at 10:11

## 2019-01-19 RX ADMIN — KETOROLAC TROMETHAMINE 30 MG: 30 INJECTION, SOLUTION INTRAMUSCULAR; INTRAVENOUS at 09:34

## 2019-01-19 RX ADMIN — ACETAMINOPHEN 650 MG: 325 TABLET ORAL at 09:30

## 2019-01-19 RX ADMIN — VANCOMYCIN HYDROCHLORIDE 1250 MG: 1 INJECTION, POWDER, LYOPHILIZED, FOR SOLUTION INTRAVENOUS at 16:05

## 2019-01-19 RX ADMIN — ONDANSETRON 4 MG: 2 INJECTION INTRAMUSCULAR; INTRAVENOUS at 09:30

## 2019-01-19 RX ADMIN — FENOFIBRATE 145 MG: 145 TABLET, FILM COATED ORAL at 16:14

## 2019-01-19 RX ADMIN — POTASSIUM CHLORIDE: 2 INJECTION, SOLUTION, CONCENTRATE INTRAVENOUS at 16:15

## 2019-01-19 RX ADMIN — PANTOPRAZOLE SODIUM 40 MG: 40 INJECTION, POWDER, FOR SOLUTION INTRAVENOUS at 09:32

## 2019-01-19 RX ADMIN — ZOLPIDEM TARTRATE 5 MG: 5 TABLET, FILM COATED ORAL at 22:15

## 2019-01-19 RX ADMIN — SODIUM CHLORIDE 1000 ML: 0.9 INJECTION, SOLUTION INTRAVENOUS at 09:28

## 2019-01-19 NOTE — PLAN OF CARE
Problem: PAIN - ADULT  Goal: Verbalizes/displays adequate comfort level or baseline comfort level  Interventions:  - Encourage patient to monitor pain and request assistance  - Assess pain using appropriate pain scale  - Administer analgesics based on type and severity of pain and evaluate response  - Implement non-pharmacological measures as appropriate and evaluate response  - Consider cultural and social influences on pain and pain management  - Notify physician/advanced practitioner if interventions unsuccessful or patient reports new pain  Outcome: Progressing  Pt rates her pain at a 7/10 in her abdomen, non-pharmacological measures initiated  Will continue to monitor

## 2019-01-19 NOTE — EMTALA/ACUTE CARE TRANSFER
[facility] EMERGENCY DEPARTMENT  [address] [place] [id number]  [phone]  Dept: [facility] CONSENT    NAME Jenny Larsen                                         1955                              MRN [id number]    I have been informed of my rights regarding examination, treatment, and transfer   by Dr Weston Lopez MD    Benefits: Specialized equipment and/or services available at the receiving facility (Include comment)________________________    Risks: Potential for delay in receiving treatment, Potential deterioration of medical condition, Loss of IV, Possible worsening of condition or death during transfer, Increased discomfort during transfer      Consent for Transfer:  I acknowledge that my medical condition has been evaluated and explained to me by the emergency department physician or other qualified medical person and/or my attending physician, who has recommended that I be transferred to the service of  Accepting Physician: Marilee Hamm at [address] Name, [address] : Gwen Coma   The above potential benefits of such transfer, the potential risks associated with such transfer, and the probable risks of not being transferred have been explained to me, and I fully understand them  The doctor has explained that, in my case, the benefits of transfer outweigh the risks  I agree to be transferred  I authorize the performance of emergency medical procedures and treatments upon me in both transit and upon arrival at the receiving facility  Additionally, I authorize the release of any and all medical records to the receiving facility and request they be transported with me, if possible  I understand that the safest mode of transportation during a medical emergency is an ambulance and that the Hospital advocates the use of this mode of transport   Risks of traveling to the receiving facility by car, including absence of medical control, life sustaining equipment, such as oxygen, and medical personnel has been explained to me and I fully understand them  (YI CORRECT BOX BELOW)  [  ]  I consent to the stated transfer and to be transported by ambulance/helicopter  [  ]  I consent to the stated transfer, but refuse transportation by ambulance and accept full responsibility for my transportation by car  I understand the risks of non-ambulance transfers and I exonerate the Hospital and its staff from any deterioration in my condition that results from this refusal     X___________________________________________    DATE  19  TIME________  Signature of patient or legally responsible individual signing on patient behalf           RELATIONSHIP TO PATIENT_________________________          Provider [id number] [facility] 1955                              MRN [id number]    A medical screening exam was performed on the above named patient  Based on the examination:    Condition Necessitating Transfer The primary encounter diagnosis was Pneumonia  Diagnoses of Gastroenteritis and Weakness were also pertinent to this visit      Patient Condition: The patient has been stabilized such that within reasonable medical probability, no material deterioration of the patient condition or the condition of the unborn child(gwyn) is likely to result from the transfer    Reason for Transfer: Level of Care needed not available at this facility    Transfer Requirements: [facility]    · Space available and qualified personnel available for treatment as acknowledged by    · Agreed to accept transfer and to provide appropriate medical treatment as acknowledged by       Dr Rainey  · Appropriate medical records of the examination and treatment of the patient are provided at the time of transfer   [address], [address] _______  · Transfer will be performed by qualified personnel from    and appropriate transfer equipment as required, including the use of necessary and appropriate life support measures  Provider Certification: I have examined the patient and explained the following risks and benefits of being transferred/refusing transfer to the patient/family:         Based on these reasonable risks and benefits to the patient and/or the unborn child(gwyn), and based upon the information available at the time of the patients examination, I certify that the medical benefits reasonably to be expected from the provision of appropriate medical treatments at another medical facility outweigh the increasing risks, if any, to the individuals medical condition, and in the case of labor to the unborn child, from effecting the transfer      X____________________________________________ DATE 01/19/19        TIME_______      ORIGINAL - SEND TO MEDICAL RECORDS   COPY - SEND WITH PATIENT DURING TRANSFER

## 2019-01-19 NOTE — H&P
Jennifer Mace#  [id number]  [id number]:[id number]    QKB:[id number]  Adm Date: 1/19/2019 1256  12:56 PM   ATT PHY: Candi Corona, [facility]         Chief Complaint:  Fever, Productive cough, hypoxia and left lung pneumonia    History of Presenting Illness: Mario Angulo is a(n) 61y o  year old female who was admitted through emergency department which she presented with 2 weeks of productive cough, off and on fever T-max around 101° F, generalized weakness and decreased oral intake  In the emergency department workup patient was noticed to have evidence of left lung lingular pneumonia along with dehydration  Patient was examined at bedside  Patient reports that she was having difficulty breathing mainly due to productive cough  Patient admitted that she has not eaten anything solid over the past 1 week  Patient called [facility] office on Monday and Z-Alexi was called in for the patient along with the cough medicine  Patient reports that her condition got worse as she continues to spike fever      No Known Allergies    Current Facility-Administered Medications on File Prior to Encounter   Medication Dose Route Frequency Provider Last Rate Last Dose    [COMPLETED] acetaminophen (TYLENOL) tablet 650 mg  650 mg Oral Once Ryan Johnson MD   650 mg at 01/19/19 0930    [COMPLETED] iohexol (OMNIPAQUE) 350 MG/ML injection (SINGLE-DOSE) 100 mL  100 mL Intravenous Once in imaging Immanuel Munguia MD   100 mL at 01/19/19 1011    [COMPLETED] ketorolac (TORADOL) injection 30 mg  30 mg Intravenous Once Ryan Johnson MD   30 mg at 01/19/19 0934    [COMPLETED] levofloxacin (LEVAQUIN) IVPB (premix) 750 mg  750 mg Intravenous Once Ryan Johnson MD   Stopped at 01/19/19 1202    [COMPLETED] ondansetron (ZOFRAN) injection 4 mg  4 mg Intravenous Once Ryan Johnson MD   4 mg at 01/19/19 0930    [COMPLETED] pantoprazole (PROTONIX) injection 40 mg  40 mg Intravenous Once Alex Seen, MD   40 mg at 01/19/19 0932    [COMPLETED] sodium chloride 0 9 % bolus 1,000 mL  1,000 mL Intravenous Once Alex Seen, MD   Stopped at 01/19/19 1202     Current Outpatient Prescriptions on File Prior to Encounter   Medication Sig Dispense Refill    estradiol (ESTRACE) 0 1 mg/g vaginal cream Insert 1 g into the vagina 2 (two) times a week (Patient taking differently: Insert 1 g into the vagina 2 (two) times a week Thursday and Sunday ) 42 5 g 3    fenofibrate (TRICOR) 145 mg tablet Take 145 mg by mouth daily      omeprazole (PriLOSEC) 20 mg delayed release capsule Take 20 mg by mouth daily      zolpidem (AMBIEN) 5 mg tablet       [DISCONTINUED] estradiol (ESTRACE) 0 1 mg/g vaginal cream Sig 1 gm in vagina hs for 2 weeks, then 2 to 3 x week 42 5 g 2    [DISCONTINUED] estradiol (ESTRACE) 0 1 mg/g vaginal cream Sig 1 gm hs in vagina for 14 nights, then 1 gm hs, 2 to 3 x a week 42 5 g 3       Active Ambulatory Problems     Diagnosis Date Noted    Thickened endometrium 12/01/2017    Postmenopausal 12/01/2017    Uterine polyp 12/01/2017    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    Postoperative state 01/31/2018    Post-operative pain 02/01/2018    Abnormal CT of the abdomen 03/01/2018    Bacterial vaginitis 10/08/2018     Resolved Ambulatory Problems     Diagnosis Date Noted    No Resolved Ambulatory Problems     Past Medical History:   Diagnosis Date    CPAP (continuous positive airway pressure) dependence     DJD (degenerative joint disease)     Gait abnormality     GERD (gastroesophageal reflux disease)     History of transfusion     Hyperlipidemia     Obesity (BMI 35 0-39 9 without comorbidity)     Sleep apnea        Past Surgical History:   Procedure Laterality Date    BLADDER SUSPENSION      CHOLECYSTECTOMY      COLONOSCOPY      CYSTOSCOPY N/A 1/23/2018    Procedure: CYSTOSCOPY;  Surgeon: Lien Smith MD;  [facility];  Service: Gynecology Oncology    JOINT REPLACEMENT Bilateral     AR HYSTEROSCOPY,W/ENDO BX N/A 12/1/2017    Procedure: DILATATION AND CURETTAGE (D&C) WITH HYSTEROSCOPY; POLYPECTOMY;  Surgeon: Mi Nicole MD;  [facility];  Service: Gynecology    AR LAPAROSCOPY W TOT HYSTERECTUTERUS <=250 Suzzanne Spina  W TUBE/OVARY N/A 1/23/2018    Procedure: ROBOTIC ASSISTED TOTAL LAPAROSCOPIC HYSTERECTOMY; BILATERAL SALPINGOOPHERECTOMY;  Surgeon: Malia Shay MD;  [facility];  Service: Gynecology Oncology    REPLACEMENT TOTAL KNEE BILATERAL      ROTATOR CUFF REPAIR Left     TUBAL LIGATION         Social History:   Social History     Social History    Marital status: /Civil Union     Spouse name: N/A    Number of children: N/A    Years of education: N/A     Social History Main Topics    Smoking status: Never Smoker    Smokeless tobacco: Never Used    Alcohol use No    Drug use: No    Sexual activity: Yes     Partners: Male      Comment: with      Other Topics Concern    None     Social History Narrative    None       Family History: History reviewed  No pertinent family history  Review of Systems   Respiratory: Positive for cough  Gastrointestinal: Positive for diarrhea  Musculoskeletal: Positive for arthralgias, gait problem and neck pain  Neurological: Positive for weakness  All other systems reviewed and are negative  Physical Exam   Constitutional: Awake and Alert  Well-developed and well-nourished  No distress  HENT: PERR,EOMI, conjunctiva normal  Head: Normocephalic and atraumatic  Mouth/Throat: Oropharynx is clear and moist     Eyes: Conjunctivae and EOM are normal  Pupils are equal, round, and reactive to light  Right and left eye exhibits no discharge  Neck: Neck supple  No tracheal deviation present  No thyromegaly present  Cardiovascular: Normal rate, regular rhythm and normal heart sounds  Exam reveals no friction rub  No murmur heard    Pulmonary/Chest: Effort normal and breath sounds normal  No respiratory distress  She has no wheezes  Abdominal: Soft  Bowel sounds are normal  She exhibits no distension  There is no tenderness  There is no rebound and no guarding  Neurological: Cranial Nerves grossly intact  No sensory deficit  Coordination normal    Musculoskeletal:   Nontender spine  Skin: Skin is warm and dry  No rash noted  No diaphoresis  No erythema  No edema  No cyanosis  Assessment     Darryn Schlutz is a(n) 61y o  year old female with left lung lingular pneumonia with dehydration and generalized weakness  1  Left lung lingular pneumonia with productive cough  Patient will be put on Levaquin 500 mg IV daily along with vancomycin 1500 mg IV 2 times daily  Pharmacy has been consulted to manage vancomycin dosage  I will put the patient on guaifenesin for cough  Patient is on oxygen protocol  I will send the sputum for culture and sensitivity  Patient may get Tylenol on as needed basis for fever  2  Mild dehydration  Patient has been put on D5 normal saline at 125 cc an hour  3  Gastroesophageal reflux disease  I will put the patient on Protonix  4  Acute diarrhea with recent history of Z-Alexi  I will get stool for C diff toxins to rule out C diff colitis  5  Cardiac with history of dyslipidemia  Patient is on Tricor for  6  Insomnia  Patient is on Ambien on as-needed basis  7  Degenerative joint disease/osteoarthritis with history of chronic neck pain and headaches  Patient may get naproxen every 12 hr on as-needed basis  Patient has renal insufficiency  We will keep a close watch on patient's renal functions  8  Gait disturbance with generalized weakness  PT OT has been consulted  9  History of obstructive sleep apnea  Patient does not use CPAP at home because of the mask  We will keep the patient on oxygen by nasal cannula at night  Prognosis: fair  Discharge Plan: in progress      Advanced Directives: I have discussed in detail the patient the advanced directives  Patient is , Baldev Rape is patient's legal power of  and his phone number is [phone]  Patient has no living will  On review of pulmonary and cardiac resuscitation status, The patient wishes to be FULL CODE  I have spent more than 50 minutes gathering data, doing physical examination, and discussing the advanced directives, which was witnessed by caring staff

## 2019-01-19 NOTE — PROGRESS NOTES
Vancomycin Assessment    Juanita Aranda is a 61 y o  female who is currently receiving vancomycin 1500mg IV q12 hours for Pneumonia     Relevant clinical data and objective history reviewed:  Creatinine   Date Value Ref Range Status   01/19/2019 1 07 0 60 - 1 20 mg/dL Final     Comment:     Standardized to IDMS reference method   03/21/2018 0 67 0 6 - 1 2 MG/DL Final     Comment:     IDMS method performed  The drugs Metamizole, Sulfasalazine, and Sulfapyridine may interfere and  result in false low results  01/24/2018 0 73 0 60 - 1 30 mg/dL Final     Comment:     Standardized to IDMS reference method   01/18/2018 0 78 0 50 - 0 99 mg/dL Final     Comment:     Result Comment: For patients >52years of age, the reference limit  for Creatinine is approximately 13% higher for people  identified as -American      12/01/2017 0 77 0 60 - 1 30 mg/dL Final     Comment:     Standardized to IDMS reference method     /63 (BP Location: Left arm)   Pulse 82   Temp 98 7 °F (37 1 °C) (Temporal)   Resp 18   Ht 5' 8" (1 727 m)   Wt 106 kg (234 lb)   SpO2 95%   BMI 35 58 kg/m²   No intake/output data recorded  Lab Results   Component Value Date/Time    BUN 21 01/19/2019 09:22 AM    BUN 14 03/21/2018 01:46 PM    BUN 10 02/13/2018 10:29 AM    WBC 12 00 (H) 01/19/2019 09:22 AM    WBC 6 0 03/21/2018 01:46 PM    HGB 12 7 01/19/2019 09:22 AM    HGB 12 8 03/21/2018 01:46 PM    HCT 39 0 01/19/2019 09:22 AM    HCT 38 7 03/21/2018 01:46 PM    MCV 85 01/19/2019 09:22 AM    MCV 85 0 03/21/2018 01:46 PM     01/19/2019 09:22 AM     03/21/2018 01:46 PM     Temp Readings from Last 3 Encounters:   01/19/19 98 7 °F (37 1 °C) (Temporal)   01/19/19 100 °F (37 8 °C)   03/01/18 98 2 °F (36 8 °C)     Vancomycin Days of Therapy: 1    Assessment/Plan  The patient is currently on vancomycin utilizing scheduled dosing based on adjusted body weight (due to obesity)  Baseline risks associated with therapy include: pre-existing renal impairment and advanced age  The patient is currently receiving 1500mg IV q12 hours and after clinical evaluation will be changed to 1250mg IV q12 hours  Pharmacy will also follow closely for s/sx of nephrotoxicity, infusion reactions and appropriateness of therapy  BMP and CBC will be ordered per protocol  Plan for trough as patient approaches steady state, prior to the 5th  dose at approximately 1530 on 1/21/2019  Due to infection severity, will target a trough of 15-20 (appropriate for most indications)   Pharmacy will continue to follow the patients culture results and clinical progress daily      Raymundo Jackson, Pharmacist

## 2019-01-19 NOTE — NURSING NOTE
Pt admitted to room 112 2 from the er, oriented times 4, pt oriented to the room and the callbell, all questions answered and the pt verbalized understanding, dr Jaycee Dias was in to see and assess the pt, orders noted, stool and urine sent to the lab, pt presently in bed in no acute distress watching tv, callbell in reach of the pt, encouraged to ring with any needs and the pt agrees

## 2019-01-19 NOTE — PLAN OF CARE
Problem: DISCHARGE PLANNING - CARE MANAGEMENT  Goal: Discharge to post-acute care or home with appropriate resources  INTERVENTIONS:  - Conduct assessment to determine patient/family and health care team treatment goals, and need for post-acute services based on payer coverage, community resources, and patient preferences, and barriers to discharge  - Address psychosocial, clinical, and financial barriers to discharge as identified in assessment in conjunction with the patient/family and health care team  - Arrange appropriate level of post-acute services according to patient's   needs and preference and payer coverage in collaboration with the physician and health care team  - Communicate with and update the patient/family, physician, and health care team regarding progress on the discharge plan  - Arrange appropriate transportation to post-acute venues  Discharge home with spouse without any services  Spouse will transport home    Outcome: Progressing

## 2019-01-19 NOTE — SOCIAL WORK
Evaluated the pt at the bedside  Pt states she lives with her spouse in a one story home with 3 steps outside  Pt indicated she is independent with ADL's and IADl's and is able to drive  Pt states she has a CPAP machine at home  , however has not worn it in a long time  Pt gets her medications at [facility] in [facility]  Pt states she will have no needs upon discharge  Spouse will transport home upon discharge  CM reviewed d/c planning process including the following: identifying help at home, patient preference for d/c planning needs, availability of treatment team to discuss questions or concerns patient and/or family may have regarding understanding medications and recognizing signs and symptoms once discharged  CM also encouraged patient to follow up with all recommended appointments after discharge  Patient advised of importance for patient and family to participate in managing patients medical well being

## 2019-01-19 NOTE — ED PROVIDER NOTES
History  Chief Complaint   Patient presents with    Loss of Appetite    Vomiting     on and off for over a week   Diarrhea    Cough    Weakness - Generalized     HPI  This is a 63-year-old female presented to ED with complain of cough congestion x2 weak  Patient states that she went to see her PCP was started her on Z-Alexi and promethazine for cough on Tuesday and since then she has been having nausea vomiting and diarrhea  Patient states she is having due to 3 loose watery diarrhea and 2-3 episode of vomiting  Patient states she is able to keep liquids down  She states she did not eat solid meal since Tuesday she has no appetite  Patient also complained of abdominal pain mainly located in the lower abdomen area nonradiating nothing makes the pain better or worse  She states she did not take anything for pain  She is rating her pain 7/10 at this time  She also complained of subjective fever and chills  Patient does have fever in the ED  Patient also complain of burning on urination which is chronic  Patient also complained of generalized weakness  She denies any chest pain shortness of breath at this time  Prior to Admission Medications   Prescriptions Last Dose Informant Patient Reported?  Taking?   estradiol (ESTRACE) 0 1 mg/g vaginal cream   No No   Sig: Insert 1 g into the vagina 2 (two) times a week   Patient taking differently: Insert 1 g into the vagina 2 (two) times a week Thursday and Sunday    fenofibrate (TRICOR) 145 mg tablet  Self Yes No   Sig: Take 145 mg by mouth daily   omeprazole (PriLOSEC) 20 mg delayed release capsule  Self Yes No   Sig: Take 20 mg by mouth daily   zolpidem (AMBIEN) 5 mg tablet  Self Yes No      Facility-Administered Medications: None       Past Medical History:   Diagnosis Date    CPAP (continuous positive airway pressure) dependence     does not use machine    DJD (degenerative joint disease)     Gait abnormality     GERD (gastroesophageal reflux disease)  History of transfusion     Hyperlipidemia     Obesity (BMI 35 0-39 9 without comorbidity)     Sleep apnea        Past Surgical History:   Procedure Laterality Date    BLADDER SUSPENSION      CHOLECYSTECTOMY      COLONOSCOPY      CYSTOSCOPY N/A 1/23/2018    Procedure: CYSTOSCOPY;  Surgeon: Lindsay Morocho MD;  [facility];  Service: Gynecology Oncology    JOINT REPLACEMENT Bilateral     SD HYSTEROSCOPY,W/ENDO BX N/A 12/1/2017    Procedure: DILATATION AND CURETTAGE (D&C) WITH HYSTEROSCOPY; POLYPECTOMY;  Surgeon: Teodoro Bynum MD;  [facility];  Service: Gynecology    SD LAPAROSCOPY W TOT HYSTERECTUTERUS <=250 Lenore Cramp  W TUBE/OVARY N/A 1/23/2018    Procedure: ROBOTIC ASSISTED TOTAL LAPAROSCOPIC HYSTERECTOMY; BILATERAL SALPINGOOPHERECTOMY;  Surgeon: Lindsay Morocho MD;  [facility];  Service: Gynecology Oncology    REPLACEMENT TOTAL KNEE BILATERAL      ROTATOR CUFF REPAIR Left     TUBAL LIGATION         History reviewed  No pertinent family history  I have reviewed and agree with the history as documented  Social History   Substance Use Topics    Smoking status: Never Smoker    Smokeless tobacco: Never Used    Alcohol use No        Review of Systems   Constitutional: Positive for appetite change, chills, fatigue and fever  HENT: Positive for congestion and sore throat  Eyes: Negative  Respiratory: Positive for cough  Negative for shortness of breath, wheezing and stridor  Cardiovascular: Negative  Gastrointestinal: Positive for abdominal pain, diarrhea, nausea and vomiting  Genitourinary: Negative  Musculoskeletal: Negative  Skin: Negative  Neurological: Positive for weakness  Negative for light-headedness and headaches  Psychiatric/Behavioral: Negative  Physical Exam  Physical Exam   Constitutional: She is oriented to person, place, and time  She appears well-developed and well-nourished  She appears ill     HENT:   Head: Normocephalic and atraumatic  Eyes: Pupils are equal, round, and reactive to light  EOM are normal    Neck: Normal range of motion  Neck supple  Cardiovascular: Normal rate, regular rhythm and normal heart sounds  Pulmonary/Chest: Effort normal    Decreased breath sounds   Abdominal: Soft  Bowel sounds are normal  She exhibits no mass  There is tenderness (Lower abdomen)  There is no rebound and no guarding  Musculoskeletal: Normal range of motion  Neurological: She is alert and oriented to person, place, and time  Skin: Skin is warm  Psychiatric: She has a normal mood and affect  Nursing note and vitals reviewed        Vital Signs  ED Triage Vitals [01/19/19 0901]   Temperature Pulse Respirations Blood Pressure SpO2   (!) 101 9 °F (38 8 °C) 95 16 144/68 95 %      Temp Source Heart Rate Source Patient Position - Orthostatic VS BP Location FiO2 (%)   Tympanic Monitor Sitting Left arm --      Pain Score       No Pain           Vitals:    01/19/19 0901 01/19/19 1132 01/19/19 1233   BP: 144/68 105/51 (!) 102/41   Pulse: 95 71 70   Patient Position - Orthostatic VS: Sitting Lying        Visual Acuity      ED Medications  Medications   sodium chloride 0 9 % bolus 1,000 mL (0 mL Intravenous Stopped 1/19/19 1202)   acetaminophen (TYLENOL) tablet 650 mg (650 mg Oral Given 1/19/19 0930)   pantoprazole (PROTONIX) injection 40 mg (40 mg Intravenous Given 1/19/19 0932)   ketorolac (TORADOL) injection 30 mg (30 mg Intravenous Given 1/19/19 0934)   ondansetron (ZOFRAN) injection 4 mg (4 mg Intravenous Given 1/19/19 0930)   iohexol (OMNIPAQUE) 350 MG/ML injection (SINGLE-DOSE) 100 mL (100 mL Intravenous Given 1/19/19 1011)   levofloxacin (LEVAQUIN) IVPB (premix) 750 mg (0 mg Intravenous Stopped 1/19/19 1202)       Diagnostic Studies  Results Reviewed     Procedure Component Value Units Date/Time    Lactic acid, plasma [[id number]]  (Normal) Collected:  01/19/19 0922    Lab Status:  Final result Specimen:  Blood from Arm, Left Updated:  01/19/19 0954     LACTIC ACID 1 1 mmol/L     Narrative:         Result may be elevated if tourniquet was used during collection  Lipase [[id number]]  (Normal) Collected:  01/19/19 0922    Lab Status:  Final result Specimen:  Blood from Arm, Left Updated:  01/19/19 0953     Lipase 13 u/L     Basic metabolic panel [[id number]] Collected:  01/19/19 1481    Lab Status:  Final result Specimen:  Blood from Arm, Left Updated:  01/19/19 0748     Sodium 140 mmol/L      Potassium 3 6 mmol/L      Chloride 105 mmol/L      CO2 24 mmol/L      ANION GAP 11 mmol/L      BUN 21 mg/dL      Creatinine 1 07 mg/dL      Glucose 109 mg/dL      Calcium 8 8 mg/dL      eGFR 55 ml/min/1 73sq m     Narrative:         National Kidney Disease Education Program recommendations are as follows:  GFR calculation is accurate only with a steady state creatinine  Chronic Kidney disease less than 60 ml/min/1 73 sq  meters  Kidney failure less than 15 ml/min/1 73 sq  meters  Blood culture #2 [[id number]] Collected:  01/19/19 0941    Lab Status:   In process Specimen:  Blood from Arm, Right Updated:  01/19/19 0944    Protime-INR [[id number]]  (Abnormal) Collected:  01/19/19 0922    Lab Status:  Final result Specimen:  Blood from Arm, Left Updated:  01/19/19 0943     Protime 14 7 (H) seconds      INR 1 28    CBC and differential [[id number]]  (Abnormal) Collected:  01/19/19 0922    Lab Status:  Final result Specimen:  Blood from Arm, Left Updated:  01/19/19 0935     WBC 12 00 (H) Thousand/uL      RBC 4 61 Million/uL      Hemoglobin 12 7 g/dL      Hematocrit 39 0 %      MCV 85 fL      MCH 27 6 pg      MCHC 32 6 g/dL      RDW 15 1 %      MPV 8 8 (L) fL      Platelets 799 Thousands/uL      nRBC 0 /100 WBCs      Neutrophils Relative 83 (H) %      Lymphocytes Relative 7 (L) %      Monocytes Relative 10 %      Eosinophils Relative 1 %      Basophils Relative 1 %      Neutrophils Absolute 9 90 (H) Thousands/µL      Lymphocytes Absolute 0 80 Thousands/µL      Monocytes Absolute 1 10 Thousand/µL      Eosinophils Absolute 0 10 Thousand/µL      Basophils Absolute 0 10 Thousands/µL     Rapid Influenza Screen with Reflex PCR [[id number]]  (Normal) Collected:  01/19/19 0911    Lab Status:  Final result Specimen:  Nasopharyngeal from Nasopharyngeal Swab Updated:  01/19/19 0929     Rapid Influenza A Ag Negative     Rapid Influenza B Ag Negative    INFLUENZA A/B AND RSV, PCR [[id number]] Collected:  01/19/19 0911    Lab Status: In process Specimen:  Nasopharyngeal from Nasopharyngeal Swab Updated:  01/19/19 0929    Blood culture #1 [[id number]] Collected:  01/19/19 0922    Lab Status: In process Specimen:  Blood from Arm, Left Updated:  01/19/19 0926                 CT abdomen pelvis with contrast   Final Result by Talib Alfred MD (01/19 1040)      1  No acute findings in the abdomen or pelvis  2   Patchy consolidation is noted in the left lower lobe compatible with pneumonia  There is mild patchy opacity in the lingula as well  Recommend follow up chest x-ray to resolution  3  Slight haziness to the central mesenteric fat  A few scattered mildly prominent mesenteric lymph nodes  Findings again are nonspecific and 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  This could be followed up with CT in 6 to 12 months  The study was marked in EPIC for significant notification  Workstation performed: [id number]         XR chest 1 view portable   Final Result by Papo Sanders MD (01/19 2241)      Pneumonia on the left            Workstation performed: [id number]                    Procedures  Procedures       Phone Contacts  ED Phone Contact    ED Course  ED Course as of Jan 19 1644   Sat Jan 19, 2019   1005 Result are discussed with the patient and  was by bedside, she states she is not feeling any better after the medication  Chest x-ray shows the pneumonia on the left side  pending x-ray      [phone] Patient failed outpatient therapy presented with generalized weakness found to have left lung infiltrate unable to tolerate p o  Intake will admit                                  MDM  CritCare Time    Disposition  Final diagnoses:   Pneumonia   Gastroenteritis   Weakness     Time reflects when diagnosis was documented in both MDM as applicable and the Disposition within this note     Time User Action Codes Description Comment    1/19/2019 11:27 AM Glenora Greaser Add [J18 9] Pneumonia     1/19/2019 11:28 AM Glenora Greaser Add [K52 9] Gastroenteritis     1/19/2019 11:28 AM Glenora Greaser Add [R53 1] Weakness       ED Disposition     ED Disposition Condition Comment    Transfer to Another [facility] should be transferred out to Replaced by [facility]      MD Documentation      Most Recent Value   Patient Condition  The patient has been stabilized such that within reasonable medical probability, no material deterioration of the patient condition or the condition of the unborn child(gwyn) is likely to result from the transfer   Reason for Transfer  Level of Care needed not available at this facility   Benefits of Transfer  Specialized equipment and/or services available at the receiving facility (Include comment)________________________   Risks of Transfer  Potential for delay in receiving treatment, Potential deterioration of medical condition, Loss of IV, Possible worsening of condition or death during transfer, Increased discomfort during transfer   Accepting Physician  [address] Name, Bernardo  [address] by Nabil and Unit #)  Jumana Stein   Sending MD  [address] Name, SUMMER Bassett 19    Bed Assignment  112-1   Report Given to  RN   Transport Mode  Ambulance   Transported by Nabil and Unit #)  [facility]   Level of Care  Advanced life support   Copies of Medical Records Sent  History and Physical   Patient Belongings Disposition  Sent with patient      Follow-up Information    None         Discharge Medication List as of 1/19/2019 12:35 PM      CONTINUE these medications which have NOT CHANGED    Details   !! estradiol (ESTRACE) 0 1 mg/g vaginal cream Insert 1 g into the vagina 2 (two) times a week, Starting Thu 11/29/2018, Normal      fenofibrate (TRICOR) 145 mg tablet Take 145 mg by mouth daily, Historical Med      omeprazole (PriLOSEC) 20 mg delayed release capsule Take 20 mg by mouth daily, Historical Med      zolpidem (AMBIEN) 5 mg tablet Starting Tue 2/27/2018, Historical Med      !! estradiol (ESTRACE) 0 1 mg/g vaginal cream Sig 1 gm hs in vagina for 14 nights, then 1 gm hs, 2 to 3 x a week, Phone In       !! - Potential duplicate medications found  Please discuss with provider  No discharge procedures on file      ED Provider  Electronically Signed by           Tejal Montanez MD  01/19/19 5852

## 2019-01-20 LAB
ALBUMIN SERPL BCP-MCNC: 3.4 G/DL (ref 3.5–5.7)
ALP SERPL-CCNC: 42 U/L (ref 55–165)
ALT SERPL W P-5'-P-CCNC: 25 U/L (ref 7–52)
ANION GAP SERPL CALCULATED.3IONS-SCNC: 8 MMOL/L (ref 4–13)
AST SERPL W P-5'-P-CCNC: 32 U/L (ref 13–39)
BASOPHILS # BLD AUTO: 0 THOUSANDS/ΜL (ref 0–0.1)
BASOPHILS NFR BLD AUTO: 0 % (ref 0–2)
BILIRUB SERPL-MCNC: 0.3 MG/DL (ref 0.2–1)
BUN SERPL-MCNC: 14 MG/DL (ref 7–25)
C DIFF TOX GENS STL QL NAA+PROBE: NORMAL
CALCIUM SERPL-MCNC: 8.4 MG/DL (ref 8.6–10.5)
CHLORIDE SERPL-SCNC: 106 MMOL/L (ref 98–107)
CO2 SERPL-SCNC: 23 MMOL/L (ref 21–31)
CREAT SERPL-MCNC: 0.92 MG/DL (ref 0.6–1.2)
EOSINOPHIL # BLD AUTO: 0.1 THOUSAND/ΜL (ref 0–0.61)
EOSINOPHIL NFR BLD AUTO: 1 % (ref 0–5)
ERYTHROCYTE [DISTWIDTH] IN BLOOD BY AUTOMATED COUNT: 15.5 % (ref 11.5–14.5)
GFR SERPL CREATININE-BSD FRML MDRD: 66 ML/MIN/1.73SQ M
GLUCOSE SERPL-MCNC: 96 MG/DL (ref 65–99)
HCT VFR BLD AUTO: 34.9 % (ref 34.8–46.1)
HGB BLD-MCNC: 11.4 G/DL (ref 12–16)
LYMPHOCYTES # BLD AUTO: 1.3 THOUSANDS/ΜL (ref 0.6–4.47)
LYMPHOCYTES NFR BLD AUTO: 13 % (ref 21–51)
MCH RBC QN AUTO: 27.4 PG (ref 26–34)
MCHC RBC AUTO-ENTMCNC: 32.7 G/DL (ref 31–37)
MCV RBC AUTO: 84 FL (ref 81–99)
MONOCYTES # BLD AUTO: 1 THOUSAND/ΜL (ref 0.17–1.22)
MONOCYTES NFR BLD AUTO: 11 % (ref 2–12)
NEUTROPHILS # BLD AUTO: 7.3 THOUSANDS/ΜL (ref 1.4–6.5)
NEUTS SEG NFR BLD AUTO: 75 % (ref 42–75)
NRBC BLD AUTO-RTO: 0 /100 WBCS
PLATELET # BLD AUTO: 222 THOUSANDS/UL (ref 149–390)
PMV BLD AUTO: 8.9 FL (ref 8.6–11.7)
POTASSIUM SERPL-SCNC: 3.6 MMOL/L (ref 3.5–5.5)
PROT SERPL-MCNC: 6.8 G/DL (ref 6.4–8.9)
RBC # BLD AUTO: 4.15 MILLION/UL (ref 3.9–5.2)
SODIUM SERPL-SCNC: 137 MMOL/L (ref 134–143)
WBC # BLD AUTO: 9.8 THOUSAND/UL (ref 4.8–10.8)

## 2019-01-20 PROCEDURE — G8980 MOBILITY D/C STATUS: HCPCS

## 2019-01-20 PROCEDURE — G8978 MOBILITY CURRENT STATUS: HCPCS

## 2019-01-20 PROCEDURE — 85025 COMPLETE CBC W/AUTO DIFF WBC: CPT | Performed by: FAMILY MEDICINE

## 2019-01-20 PROCEDURE — 80053 COMPREHEN METABOLIC PANEL: CPT | Performed by: FAMILY MEDICINE

## 2019-01-20 PROCEDURE — G8979 MOBILITY GOAL STATUS: HCPCS

## 2019-01-20 PROCEDURE — 97161 PT EVAL LOW COMPLEX 20 MIN: CPT

## 2019-01-20 RX ADMIN — FENOFIBRATE 145 MG: 145 TABLET, FILM COATED ORAL at 09:32

## 2019-01-20 RX ADMIN — LEVOFLOXACIN 500 MG: 5 INJECTION, SOLUTION INTRAVENOUS at 11:47

## 2019-01-20 RX ADMIN — VANCOMYCIN HYDROCHLORIDE 1250 MG: 1 INJECTION, POWDER, LYOPHILIZED, FOR SOLUTION INTRAVENOUS at 15:51

## 2019-01-20 RX ADMIN — GUAIFENESIN 200 MG: 200 SOLUTION ORAL at 09:39

## 2019-01-20 RX ADMIN — PANTOPRAZOLE SODIUM 40 MG: 40 TABLET, DELAYED RELEASE ORAL at 05:51

## 2019-01-20 RX ADMIN — ACETAMINOPHEN 650 MG: 325 TABLET ORAL at 23:22

## 2019-01-20 RX ADMIN — VANCOMYCIN HYDROCHLORIDE 1250 MG: 1 INJECTION, POWDER, LYOPHILIZED, FOR SOLUTION INTRAVENOUS at 04:22

## 2019-01-20 RX ADMIN — POTASSIUM CHLORIDE: 2 INJECTION, SOLUTION, CONCENTRATE INTRAVENOUS at 10:56

## 2019-01-20 RX ADMIN — ZOLPIDEM TARTRATE 5 MG: 5 TABLET, FILM COATED ORAL at 22:20

## 2019-01-20 NOTE — UTILIZATION REVIEW
Initial Clinical Review    TRANSFER FROM [facility]    Admission: Date/Time/Statement: 1/19/19 @ 1256   Orders Placed This Encounter   Procedures    Inpatient Admission     Standing Status:   Standing     Number of Occurrences:   1     Order Specific Question:   Admitting Physician     Answer:   Payal Wilson     Order Specific Question:   Level of Care     Answer:   Med Surg [16]     Order Specific Question:   Estimated length of stay     Answer:   More than 2 Midnights     Order Specific Question:   Certification     Answer:   I certify that inpatient services are medically necessary for this patient for a duration of greater than two midnights  See H&P and MD Progress Notes for additional information about the patient's course of treatment  Chief Complaint: No chief complaint on file  History of Illness: Craig Dubin is a(n) 61y o  year old female who was admitted through emergency department which she presented with 2 weeks of productive cough, off and on fever T-max around 101° F, generalized weakness and decreased oral intake  In the emergency department workup patient was noticed to have evidence of left lung lingular pneumonia along with dehydration      Patient was examined at bedside  Patient reports that she was having difficulty breathing mainly due to productive cough  Patient admitted that she has not eaten anything solid over the past 1 week  [facility] Z-Alexi was called in for the patient along with the cough medicine    Patient reports that her condition got worse as she continues to spike fever        ED Vital Signs:   ED Triage Vitals   Temperature Pulse Respirations Blood Pressure SpO2   01/19/19 1359 01/19/19 1359 01/19/19 1359 01/19/19 1359 01/19/19 1357   98 7 °F (37 1 °C) 82 18 100/63 95 %      Temp Source Heart Rate Source Patient Position - Orthostatic VS BP Location FiO2 (%)   01/19/19 1359 01/19/19 1359 01/19/19 1359 01/19/19 1359 --   Temporal Monitor Sitting Left arm       Pain Score       01/19/19 1307       7        Wt Readings from Last 1 Encounters:   01/19/19 106 kg (234 lb)     Vital Signs (abnormal):       Temp  Max   103 8    Pertinent Labs/Diagnostic Test Results:    CT  Abd/pelvis:    No acute findings in the abdomen or pelvis  2   Patchy consolidation is noted in the left lower lobe compatible with pneumonia   There is mild patchy opacity in the lingula as well   Recommend follow up chest x-ray to resolution  3  Slight haziness to the central mesenteric fat   A few scattered mildly prominent mesenteric lymph nodes   Findings again are nonspecific and 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   This could be followed up with CT in 6 to 12 months  CXR:    L  Sided  PNA  WBC:   12 00  Abs neutro   9 90  Albumin  3 4     Past Medical/Surgical History: Active Ambulatory Problems     Diagnosis Date Noted    Thickened endometrium 12/01/2017    Postmenopausal 12/01/2017    Uterine polyp 12/01/2017    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    Postoperative state 01/31/2018    Post-operative pain 02/01/2018    Abnormal CT of the abdomen 03/01/2018    Bacterial vaginitis 10/08/2018     Resolved Ambulatory Problems     Diagnosis Date Noted    No Resolved Ambulatory Problems     Past Medical History:   Diagnosis Date    CPAP (continuous positive airway pressure) dependence     DJD (degenerative joint disease)     Gait abnormality     GERD (gastroesophageal reflux disease)     History of transfusion     Hyperlipidemia     Obesity (BMI 35 0-39 9 without comorbidity)     Sleep apnea      Admitting Diagnosis: Pneumonia [J18 9]     Assessment/Plan: Sukumar Chandler is a(n) 61y o  year old female with left lung lingular pneumonia with dehydration and generalized weakness      1  Left lung lingular pneumonia with productive cough    Patient will be put on Levaquin 500 mg IV daily along with vancomycin 1500 mg IV 2 times daily  Pharmacy has been consulted to manage vancomycin dosage  I will put the patient on guaifenesin for cough  Patient is on oxygen protocol  I will send the sputum for culture and sensitivity  Patient may get Tylenol on as needed basis for fever  2  Mild dehydration  Patient has been put on D5 normal saline at 125 cc an hour  3  Gastroesophageal reflux disease  I will put the patient on Protonix  4  Acute diarrhea with recent history of Z-Alexi  I will get stool for C diff toxins to rule out C diff colitis  5  Cardiac with history of dyslipidemia  Patient is on Tricor for  6  Insomnia  Patient is on Ambien on as-needed basis  7  Degenerative joint disease/osteoarthritis with history of chronic neck pain and headaches  Patient may get naproxen every 12 hr on as-needed basis  Patient has renal insufficiency  We will keep a close watch on patient's renal functions  8  Gait disturbance with generalized weakness  PT OT has been consulted  9  History of obstructive sleep apnea  Patient does not use CPAP at home because of the mask  We will keep the patient on oxygen by nasal cannula at night        Admission Orders:   IP  1/19  @    1432  Scheduled Meds:   Current Facility-Administered Medications:  acetaminophen 650 mg Oral Q6H PRN Merlene Hemphill MD    estradiol 1 g Vaginal Once per day on Sun Thu Merlene Hemphill MD    fenofibrate 145 mg Oral Daily Merlene Hemphill MD    guaiFENesin 200 mg Oral Q4H PRN Merlene Hemphill MD    levofloxacin 500 mg Intravenous Q24H Merlene Hemphill MD    naproxen 500 mg Oral BID PRN Merlene Hemphill MD    pantoprazole 40 mg Oral Early Morning Merlene Hemphill MD    IV infusion builder  Intravenous Continuous Merlene Hemphill MD Last Rate: 125 mL/hr at 01/19/19 1615   vancomycin 15 mg/kg (Adjusted) Intravenous Q12H Merlene Hemphill MD Last Rate: 1,250 mg (01/20/19 0422)   zolpidem 5 mg Oral HS Merlene Hemphill MD      Continuous Infusions:   IV infusion builder Last Rate: 125 mL/hr at 01/19/19 1615     PRN Meds:   acetaminophen    guaiFENesin    naproxen     Reg diet  PT/OT  Sputum c/s  Stool c/diff  O2  3L    [address] Utilization Review Department  Phone: [phone]; Fax [phone]  [email or web address]  ATTENTION: Please call with any questions or concerns to [phone]  and carefully listen to the prompts so that you are directed to the right person  Send all requests for admission clinical reviews, approved or denied determinations and any other requests to fax [phone]   All voicemails are confidential

## 2019-01-20 NOTE — PROGRESS NOTES
Progress Note - Gissel Tan 61 y o  female MRN: [id number]    Unit/Bed#: -02 Encounter: [id number]        Subjective:   Patient seen and examined at bedside after reviewing the chart and discussing the case with the caring staff  On examination patient has no acute issues  Patient spiked a fever of 103° F last night around midnight since then patient is afebrile  Patient's Legionella test was found to be negative  Gardnerella vaginalis test is also negative  Patient's blood cultures are negative for now  Patient denied any shortness of breath and chest pain  Physical Exam   Vitals: Blood pressure 103/64, pulse 90, temperature 98 6 °F (37 °C), temperature source Temporal, resp  rate 18, height 5' 8" (1 727 m), weight 106 kg (234 lb), SpO2 93 %  ,Body mass index is 35 58 kg/m²  Constitutional: He appears well-developed  HEENT: PERR, EOMI, MMM  Cardiovascular: Normal rate and regular rhythm  Pulmonary/Chest: Effort normal and breath sounds normal    Abdomen: Soft, + BS, NT    Assessment/Plan:  Gissel Tan is a(n) 61y o  year old female with left lung lingular pneumonia with dehydration and generalized weakness      1  Left lung lingular pneumonia with productive cough  Patient will be put on Levaquin 500 mg IV daily along with vancomycin 1500 mg IV 2 times daily Day # 2/10  Pharmacy has been consulted to manage vancomycin dosage  I will put the patient on guaifenesin for cough  Patient is on oxygen protocol  I will send the sputum for culture and sensitivity  Patient may get Tylenol on as needed basis for fever  2  Mild dehydration  Patient has been put on D5 normal saline at 125 cc an hour  3  Gastroesophageal reflux disease  I will put the patient on Protonix  4  Acute diarrhea with recent history of Z-Alexi  I will get stool for C diff toxins to rule out C diff colitis  5  Cardiac with history of dyslipidemia  Patient is on Tricor for  6  Insomnia    Patient is on Ambien on as-needed basis  7  Degenerative joint disease/osteoarthritis with history of chronic neck pain and headaches  Patient may get naproxen every 12 hr on as-needed basis  Patient has renal insufficiency  We will keep a close watch on patient's renal functions  8  Gait disturbance with generalized weakness  PT OT has been consulted  9  History of obstructive sleep apnea  Patient does not use CPAP at home because of the mask  We will keep the patient on oxygen by nasal cannula at night

## 2019-01-20 NOTE — PHYSICAL THERAPY NOTE
Physical Therapy Evaluation     Patient's Name: Alok Flynn    Admitting Diagnosis  Pneumonia [J18 9]    Problem List  Patient Active Problem List   Diagnosis    Thickened endometrium    Postmenopausal    Uterine polyp    Complex atypical endometrial hyperplasia    Postoperative state    Post-operative pain    Abnormal CT of the abdomen    Bacterial vaginitis       Past Medical History  Past Medical History:   Diagnosis Date    CPAP (continuous positive airway pressure) dependence     does not use machine    DJD (degenerative joint disease)     Gait abnormality     GERD (gastroesophageal reflux disease)     History of transfusion     Hyperlipidemia     Obesity (BMI 35 0-39 9 without comorbidity)     Sleep apnea        Past Surgical History  Past Surgical History:   Procedure Laterality Date    BLADDER SUSPENSION      CHOLECYSTECTOMY      COLONOSCOPY      CYSTOSCOPY N/A 1/23/2018    Procedure: CYSTOSCOPY;  Surgeon: Juarez Maza MD;  [facility];  Service: Gynecology Oncology    JOINT REPLACEMENT Bilateral     VA HYSTEROSCOPY,W/ENDO BX N/A 12/1/2017    Procedure: DILATATION AND CURETTAGE (D&C) WITH HYSTEROSCOPY; POLYPECTOMY;  Surgeon: Vincent Melendez MD;  [facility];  Service: Gynecology    VA LAPAROSCOPY W TOT HYSTERECTUTERUS <=[address]  W TUBE/OVARY N/A 1/23/2018    Procedure: ROBOTIC ASSISTED TOTAL LAPAROSCOPIC HYSTERECTOMY; BILATERAL SALPINGOOPHERECTOMY;  Surgeon: Juarez Maza MD;  [facility];  Service: Gynecology Oncology    REPLACEMENT TOTAL KNEE BILATERAL      ROTATOR CUFF REPAIR Left     TUBAL LIGATION          01/20/19 0843   Note Type   Note type Eval only   Pain Assessment   Pain Assessment No/denies pain   Home Living   Type of [address] [place] [address] One level;Stairs to enter with rails  (3 ROSALINE)   Bathroom Shower/Tub Walk-in shower   Bathroom Equipment Grab bars in shower; Shower chair   Prior Function   Level of [place] Independent with ADLs and functional mobility  (+ )   Lives With Spouse   ADL Assistance Independent   IADLs Independent   Falls in the last 6 months 0   Restrictions/Precautions   Other Precautions O2;Multiple lines;Contact/isolation   General   Family/Caregiver Present No   Cognition   Overall Cognitive Status WFL   Arousal/Participation Alert   Orientation Level Oriented X4   Memory Within functional limits   Following Commands Follows all commands and directions without difficulty   RLE Assessment   RLE Assessment WFL   LLE Assessment   LLE Assessment WFL   Coordination   Movements are Fluid and Coordinated 1   Sensation WFL   Bed Mobility   Supine to Sit 7  Independent   Sit to Supine 7  Independent   Transfers   Sit to Stand 7  Independent   Stand to Sit 7  Independent   Ambulation/Elevation   Gait pattern WNL   Gait Assistance 7  Independent   Assistive Device None   Distance 40 ft (limited due to contact precautions)   Balance   Static Sitting Normal   Dynamic Sitting Normal   Static Standing Good   Dynamic Standing Good   Ambulatory Good   Endurance Deficit   Endurance Deficit No   Activity Tolerance   Activity Tolerance Patient tolerated treatment well   Assessment   Prognosis Excellent   Assessment Pt is 61 y o  female seen for PT evaluation s/p admit to [facility] [facility] on 1/19/2019 w/ L lung pneumonia  PT consulted to assess pt's functional mobility and d/c needs  Order placed for PT eval and tx, w/ up as tolerated order  Comorbidities affecting pt's physical performance at time of assessment include: obesity and sleep apnea  PTA, pt was ambulates unrestricted distances and all terrain, has 3 ROSALINE and lives w/  in 2 level house  Personal factors affecting pt at time of IE include: stairs to enter home  Please find objective findings from PT assessment regarding body systems outlined above with impairments and limitations including none   The following objective measures performed on IE also reveal limitations: Barthel Index: 100/100  Pt's clinical presentation is currently stable  seen in pt's presentation of lack of impairments  From PT/mobility standpoint, recommendation at time of d/c would be anticipate no needs pending progress in order to facilitate return to PLOF     Goals   Patient Goals to go home   Recommendation   Recommendation D/C PT   PT - OK to Discharge Yes   Barthel Index   Feeding 10   Bathing 5   Grooming Score 5   Dressing Score 10   Bladder Score 10   Bowels Score 10   Toilet Use Score 10   Transfers (Bed/Chair) Score 15   Mobility (Level Surface) Score 15   Stairs Score 10   Barthel Index Score 100             Bailey Irving, PT

## 2019-01-21 PROBLEM — J18.9 PNEUMONIA DUE TO INFECTIOUS ORGANISM: Status: ACTIVE | Noted: 2019-01-21

## 2019-01-21 LAB — VANCOMYCIN TROUGH SERPL-MCNC: 8.2 UG/ML (ref 5–12)

## 2019-01-21 PROCEDURE — G8989 SELF CARE D/C STATUS: HCPCS

## 2019-01-21 PROCEDURE — 97165 OT EVAL LOW COMPLEX 30 MIN: CPT

## 2019-01-21 PROCEDURE — G8987 SELF CARE CURRENT STATUS: HCPCS

## 2019-01-21 PROCEDURE — 80202 ASSAY OF VANCOMYCIN: CPT | Performed by: FAMILY MEDICINE

## 2019-01-21 PROCEDURE — G8988 SELF CARE GOAL STATUS: HCPCS

## 2019-01-21 RX ADMIN — PANTOPRAZOLE SODIUM 40 MG: 40 TABLET, DELAYED RELEASE ORAL at 06:21

## 2019-01-21 RX ADMIN — ENOXAPARIN SODIUM 40 MG: 40 INJECTION SUBCUTANEOUS at 21:38

## 2019-01-21 RX ADMIN — VANCOMYCIN HYDROCHLORIDE 1250 MG: 1 INJECTION, POWDER, LYOPHILIZED, FOR SOLUTION INTRAVENOUS at 16:28

## 2019-01-21 RX ADMIN — FENOFIBRATE 145 MG: 145 TABLET, FILM COATED ORAL at 09:02

## 2019-01-21 RX ADMIN — LEVOFLOXACIN 500 MG: 5 INJECTION, SOLUTION INTRAVENOUS at 11:48

## 2019-01-21 RX ADMIN — POTASSIUM CHLORIDE: 2 INJECTION, SOLUTION, CONCENTRATE INTRAVENOUS at 13:32

## 2019-01-21 RX ADMIN — VANCOMYCIN HYDROCHLORIDE 1250 MG: 1 INJECTION, POWDER, LYOPHILIZED, FOR SOLUTION INTRAVENOUS at 04:23

## 2019-01-21 RX ADMIN — ZOLPIDEM TARTRATE 5 MG: 5 TABLET, FILM COATED ORAL at 21:38

## 2019-01-21 RX ADMIN — POTASSIUM CHLORIDE: 2 INJECTION, SOLUTION, CONCENTRATE INTRAVENOUS at 01:30

## 2019-01-21 NOTE — PROGRESS NOTES
Vancomycin Assessment    Gissel Tan is a 61 y o  female who is currently receiving vancomycin 1250mg IV q12 hours for Pneumonia     Relevant clinical data and objective history reviewed:  Creatinine   Date Value Ref Range Status   01/20/2019 0 92 0 60 - 1 20 mg/dL Final     Comment:     Standardized to IDMS reference method   01/19/2019 1 07 0 60 - 1 20 mg/dL Final     Comment:     Standardized to IDMS reference method   03/21/2018 0 67 0 6 - 1 2 MG/DL Final     Comment:     IDMS method performed  The drugs Metamizole, Sulfasalazine, and Sulfapyridine may interfere and  result in false low results  01/24/2018 0 73 0 60 - 1 30 mg/dL Final     Comment:     Standardized to IDMS reference method   01/18/2018 0 78 0 50 - 0 99 mg/dL Final     Comment:     Result Comment: For patients >52years of age, the reference limit  for Creatinine is approximately 13% higher for people  identified as -American  /70 (BP Location: Left arm)   Pulse 72   Temp (!) 97 4 °F (36 3 °C) (Temporal)   Resp 18   Ht 5' 8" (1 727 m)   Wt 106 kg (234 lb)   SpO2 94%   BMI 35 58 kg/m²   I/O last 3 completed shifts: In: 2020 [P O :1020; I V :1000]  Out: -   Lab Results   Component Value Date/Time    BUN 14 01/20/2019 05:03 AM    BUN 14 03/21/2018 01:46 PM    BUN 10 02/13/2018 10:29 AM    WBC 9 80 01/20/2019 05:03 AM    WBC 6 0 03/21/2018 01:46 PM    HGB 11 4 (L) 01/20/2019 05:03 AM    HGB 12 8 03/21/2018 01:46 PM    HCT 34 9 01/20/2019 05:03 AM    HCT 38 7 03/21/2018 01:46 PM    MCV 84 01/20/2019 05:03 AM    MCV 85 0 03/21/2018 01:46 PM     01/20/2019 05:03 AM     03/21/2018 01:46 PM     Temp Readings from Last 3 Encounters:   01/21/19 (!) 97 4 °F (36 3 °C) (Temporal)   01/19/19 100 °F (37 8 °C)   03/01/18 98 2 °F (36 8 °C)     Vancomycin Days of Therapy: 3    Assessment/Plan  The patient is currently on vancomycin utilizing scheduled dosing  Baseline risks associated with therapy include: pre-existing renal impairment and advanced age  The patient is receiving 1250mg IV q12 hours with the most recent vancomycin level being at steady-state and sub-therapeutic based on a goal of 15-20 (appropriate for most indications) ; therefore, after clinical evaluation will be changed to 1250mg IV q8 hours   Pharmacy will continue to follow closely for s/sx of nephrotoxicity, infusion reactions and appropriateness of therapy  BMP and CBC will be ordered per protocol  Plan for trough as patient approaches steady state, prior to the 4th  dose at approximately 1400 on 1/22/2019  Pharmacy will continue to follow the patients culture results and clinical progress daily      Manisha Swann, Pharmacist

## 2019-01-21 NOTE — SOCIAL WORK
Discussed the POC with the rounding team   Pt will need a de-sat study prior to discharge if still requiring oxygen  Pt will go home with spouse upon discharge

## 2019-01-21 NOTE — NURSING NOTE
Report received from [address]  Bedside report given  Pt resting comfortably in bed, personal needs within reach, will continue to monitor

## 2019-01-21 NOTE — OCCUPATIONAL THERAPY NOTE
Occupational Therapy Evaluation      Rosemarie Mace    1/21/2019    Patient Active Problem List   Diagnosis    Thickened endometrium    Postmenopausal    Uterine polyp    Complex atypical endometrial hyperplasia    Postoperative state    Post-operative pain    Abnormal CT of the abdomen    Bacterial vaginitis       Past Medical History:   Diagnosis Date    CPAP (continuous positive airway pressure) dependence     does not use machine    DJD (degenerative joint disease)     Gait abnormality     GERD (gastroesophageal reflux disease)     History of transfusion     Hyperlipidemia     Obesity (BMI 35 0-39 9 without comorbidity)     Sleep apnea        Past Surgical History:   Procedure Laterality Date    BLADDER SUSPENSION      CHOLECYSTECTOMY      COLONOSCOPY      CYSTOSCOPY N/A 1/23/2018    Procedure: CYSTOSCOPY;  Surgeon: Curt Bell MD;  [facility];  Service: Gynecology Oncology    JOINT REPLACEMENT Bilateral     [place] HYSTEROSCOPY,W/ENDO BX N/A 12/1/2017    Procedure: DILATATION AND CURETTAGE (D&C) WITH HYSTEROSCOPY; POLYPECTOMY;  Surgeon: Davon Sims MD;  [facility];  Service: Gynecology    OK LAPAROSCOPY W TOT HYSTERECTUTERUS <=[address]  W TUBE/OVARY N/A 1/23/2018    Procedure: ROBOTIC ASSISTED TOTAL LAPAROSCOPIC HYSTERECTOMY; BILATERAL SALPINGOOPHERECTOMY;  Surgeon: Curt Bell MD;  [facility];  Service: Gynecology Oncology    REPLACEMENT TOTAL KNEE BILATERAL      ROTATOR CUFF REPAIR Left     TUBAL LIGATION        01/21/19 0819   Note Type   Note type Eval only   Restrictions/Precautions   Weight Bearing Precautions Per Order No   Other Precautions Multiple lines   Pain Assessment   Pain Assessment No/denies pain   Pain Score No Pain   Home Living   Type of [address] [place] [address] One level;Stairs to enter with rails  (3 ROSALINE)   Bathroom Shower/Tub Walk-in shower   Bathroom Equipment Grab bars in shower; Shower chair   Prior Function Level of [place] Independent with ADLs and functional mobility   Lives With Spouse   ADL Assistance Independent   IADLs Independent   Falls in the last 6 months 0   Vocational Retired   Comments I ambulation w/o AD, I Driving, 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   Psychosocial   Psychosocial (WDL) WDL   ADL   Eating Assistance 7  Independent   Grooming Assistance 7  Independent   UB Bathing Assistance 7  Independent   LB Bathing Assistance 7  Independent   UB Dressing Assistance 7  Independent   LB Dressing Assistance 7  [address] 7  Independent   Rolling L 7  Independent   Supine to Sit 7  Independent   Sit to Supine 7  Independent   Transfers   Sit to Stand 7  Independent   Stand to Sit 7  Independent   Toilet transfer 7  Independent   Functional Mobility   Functional Mobility 7  Independent   Balance   Static Sitting Normal   Dynamic Sitting Normal   Static Standing Normal   Dynamic Standing Good   Ambulatory Good   Activity Tolerance   Activity Tolerance Patient tolerated treatment well   RUE Assessment   RUE Assessment WFL  (Hx R RTC repair)   LUE Assessment   LUE Assessment WFL   Hand Function   Gross Motor Coordination Functional   Fine Motor Coordination Functional   Cognition   Overall Cognitive Status WFL   Arousal/Participation Alert; Cooperative   Attention Within functional limits   Orientation Level Oriented X4   Memory Within functional limits   Following Commands Follows all commands and directions without difficulty   Assessment   Limitation (no OT needs identified, IV pole only limitation)   Prognosis Good   Assessment Pt is a 61 y o  female seen for OT evaluation s/p admit to [facility] on 1/19/2019 w/ <principal problem not specified>  Comorbidities affecting pt's functional performance at time of assessment include: PNA presently and hx of GYN issues noted   Personal factors affecting pt at time of IE include:steps to enter environment  Prior to admission, pt was I with self care ADL's, IADL's, Ambulation w/o devices, I Driving and all daily tasks/cares for dogs  Upon evaluation, pt is essentially at baseline No OT needs were identified at [facility] time  D/C home with family w/o services indicated     Goals   Patient Goals to feel better and go home   Plan   Treatment Interventions (eval only)   Goal Expiration Date 01/21/19   Treatment Day 0   OT Frequency Eval only   Recommendation   OT Discharge Recommendation Home independent   OT - OK to Discharge Yes   Barthel Index   Feeding 10   Bathing 5   Grooming Score 5   Dressing Score 10   Bladder Score 10   Bowels Score 10   Toilet Use Score 10   Transfers (Bed/Chair) Score 15   Mobility (Level Surface) Score 15   Stairs Score 10   Barthel Index Score 100   Christiana Hudson, OT

## 2019-01-21 NOTE — PROGRESS NOTES
Progress Note Alvaro Mace 61 y o  female MRN: [id number]    Unit/Bed#: -02 Encounter: [id number]      Assessment:  1  Left lingular pneumonia  On vancomycin and Levaquin empirically  The patient is still spiking temps  Cultures negative to date  Continue empiric antibiotics  2  Dehydration on admission  Improved  DC IV fluids  Push p o  Fluids     3  Diarrhea  Questionable etiology  Improved  No complaints today  4  GERD  On Protonix         Plan:  See above    Subjective:   No new complaints    Objective:     Vitals: Blood pressure 157/70, pulse 72, temperature (!) 97 4 °F (36 3 °C), temperature source Temporal, resp  rate 18, height 5' 8" (1 727 m), weight 106 kg (234 lb), SpO2 94 %  ,Body mass index is 35 58 kg/m²  Intake/Output Summary (Last 24 hours) at 01/21/19 1850  Last data filed at 01/21/19 1300   Gross per 24 hour   Intake              720 ml   Output                0 ml   Net              720 ml       Physical Exam: /70 (BP Location: Left arm)   Pulse 72   Temp (!) 97 4 °F (36 3 °C) (Temporal)   Resp 18   Ht 5' 8" (1 727 m)   Wt 106 kg (234 lb)   SpO2 94%   BMI 35 58 kg/m²     General Appearance:    Alert, cooperative, no distress, appears stated age   Head:    Normocephalic, without obvious abnormality, atraumatic   Eyes:    Pupils are round regular equal   Ears:     Nose:    Throat:    Neck:   Supple, symmetrical, trachea midline, no adenopathy;     thyroid:  no enlargement/tenderness/nodules; no carotid    bruit or JVD   Back:      Lungs:     Bilateral and expiratory wheeze     Chest Wall:    No tenderness or deformity    Heart:    Regular rate and rhythm, S1 and S2 normal, no murmur, rub   or gallop   Breast Exam:       Abdomen:     Soft, non-tender, bowel sounds active all four quadrants,     no masses, no organomegaly   Genitalia:     Rectal:     Extremities:   Extremities normal, atraumatic, no cyanosis or edema   Pulses:   2+ and symmetric all extremities Skin:   Skin color, texture, turgor normal, no rashes or lesions   Lymph nodes:   Cervical, supraclavicular, and axillary nodes normal   Neurologic:   Cranial nerves intact        Invasive Devices     Peripheral Intravenous Line            Peripheral IV 01/21/19 Right Hand less than 1 day                Lab, Imaging and other studies: I have personally reviewed pertinent reports      VTE Pharmacologic Prophylaxis: Enoxaparin (Lovenox)  VTE Mechanical Prophylaxis: sequential compression device

## 2019-01-22 LAB — VANCOMYCIN TROUGH SERPL-MCNC: 15.8 UG/ML (ref 5–12)

## 2019-01-22 PROCEDURE — 80202 ASSAY OF VANCOMYCIN: CPT | Performed by: FAMILY MEDICINE

## 2019-01-22 RX ADMIN — VANCOMYCIN HYDROCHLORIDE 1250 MG: 1 INJECTION, POWDER, LYOPHILIZED, FOR SOLUTION INTRAVENOUS at 17:01

## 2019-01-22 RX ADMIN — PANTOPRAZOLE SODIUM 40 MG: 40 TABLET, DELAYED RELEASE ORAL at 05:12

## 2019-01-22 RX ADMIN — ACETAMINOPHEN 650 MG: 325 TABLET ORAL at 21:48

## 2019-01-22 RX ADMIN — LEVOFLOXACIN 500 MG: 5 INJECTION, SOLUTION INTRAVENOUS at 12:06

## 2019-01-22 RX ADMIN — GUAIFENESIN 200 MG: 200 SOLUTION ORAL at 21:49

## 2019-01-22 RX ADMIN — ENOXAPARIN SODIUM 40 MG: 40 INJECTION SUBCUTANEOUS at 21:35

## 2019-01-22 RX ADMIN — VANCOMYCIN HYDROCHLORIDE 1250 MG: 1 INJECTION, POWDER, LYOPHILIZED, FOR SOLUTION INTRAVENOUS at 09:10

## 2019-01-22 RX ADMIN — ZOLPIDEM TARTRATE 5 MG: 5 TABLET, FILM COATED ORAL at 21:35

## 2019-01-22 RX ADMIN — VANCOMYCIN HYDROCHLORIDE 1250 MG: 1 INJECTION, POWDER, LYOPHILIZED, FOR SOLUTION INTRAVENOUS at 00:25

## 2019-01-22 RX ADMIN — FENOFIBRATE 145 MG: 145 TABLET, FILM COATED ORAL at 09:10

## 2019-01-22 RX ADMIN — ENOXAPARIN SODIUM 40 MG: 40 INJECTION SUBCUTANEOUS at 09:10

## 2019-01-23 ENCOUNTER — APPOINTMENT (INPATIENT)
Dept: RADIOLOGY | Facility: HOSPITAL | Age: 64
DRG: 195 | End: 2019-01-23
Payer: COMMERCIAL

## 2019-01-23 PROCEDURE — 71046 X-RAY EXAM CHEST 2 VIEWS: CPT

## 2019-01-23 RX ADMIN — ENOXAPARIN SODIUM 40 MG: 40 INJECTION SUBCUTANEOUS at 08:50

## 2019-01-23 RX ADMIN — PANTOPRAZOLE SODIUM 40 MG: 40 TABLET, DELAYED RELEASE ORAL at 05:00

## 2019-01-23 RX ADMIN — FENOFIBRATE 145 MG: 145 TABLET, FILM COATED ORAL at 08:50

## 2019-01-23 RX ADMIN — LEVOFLOXACIN 500 MG: 5 INJECTION, SOLUTION INTRAVENOUS at 11:28

## 2019-01-23 RX ADMIN — ENOXAPARIN SODIUM 40 MG: 40 INJECTION SUBCUTANEOUS at 21:15

## 2019-01-23 RX ADMIN — GUAIFENESIN 200 MG: 200 SOLUTION ORAL at 21:14

## 2019-01-23 RX ADMIN — ACETAMINOPHEN 650 MG: 325 TABLET ORAL at 21:43

## 2019-01-23 RX ADMIN — ZOLPIDEM TARTRATE 5 MG: 5 TABLET, FILM COATED ORAL at 21:14

## 2019-01-23 RX ADMIN — GUAIFENESIN 200 MG: 200 SOLUTION ORAL at 08:50

## 2019-01-23 NOTE — PLAN OF CARE
DISCHARGE PLANNING     Discharge to home or other facility with appropriate resources Progressing        DISCHARGE PLANNING - CARE MANAGEMENT     Discharge to post-acute care or home with appropriate resources Progressing        INFECTION - ADULT     Absence or prevention of progression during hospitalization Progressing     Absence of fever/infection during neutropenic period Progressing        Knowledge Deficit     Patient/family/caregiver demonstrates understanding of disease process, treatment plan, medications, and discharge instructions Progressing        Nutrition/Hydration-ADULT     Nutrient/Hydration intake appropriate for improving, restoring or maintaining nutritional needs Progressing        PAIN - ADULT     Verbalizes/displays adequate comfort level or baseline comfort level Progressing        Potential for Falls     Patient will remain free of falls Progressing        SAFETY ADULT     Maintain or return to baseline ADL function Progressing     Maintain or return mobility status to optimal level Progressing     Patient will remain free of falls Progressing

## 2019-01-23 NOTE — PROGRESS NOTES
Progress Note Ary Cockayne Reuther 61 y o  female MRN: [id number]    Unit/Bed#: -02 Encounter: [id number]      Assessment:  1  Left lingular pneumonia  On vancomycin and Levaquin empirically  The patient is still spiking temps  Cultures negative to date  Continue empiric antibiotics  Repeat chest x-ray in the morning PA and lateral   Afebrile so far today  If patient continues afebrile will switch to p o  Antibiotics  Cultures are negative to date  2  Dehydration on admission  Improved  DC IV fluids  Push p o  Fluids     3  Diarrhea  Questionable etiology  Improved  No complaints today  4  GERD  On Protonix      Plan:  See above    Subjective:   See above    Objective:     Vitals: Blood pressure 155/76, pulse 74, temperature 98 6 °F (37 °C), temperature source Temporal, resp  rate 18, height 5' 8" (1 727 m), weight 106 kg (234 lb), SpO2 96 %  ,Body mass index is 35 58 kg/m²      No intake or output data in the 24 hours ending 01/22/19 2008    Physical Exam: /76 (BP Location: Right arm)   Pulse 74   Temp 98 6 °F (37 °C) (Temporal)   Resp 18   Ht 5' 8" (1 727 m)   Wt 106 kg (234 lb)   SpO2 96%   BMI 35 58 kg/m²     General Appearance:    Alert, cooperative, no distress, appears stated age   Head:    Normocephalic, without obvious abnormality, atraumatic   Eyes:    Pupils are round reviewed equal   Ears:     Nose:    Throat:    Neck:   Supple, symmetrical, trachea midline, no adenopathy;     thyroid:  no enlargement/tenderness/nodules; no carotid    bruit or JVD   Back:     Symmetric, no curvature, ROM normal, no CVA tenderness   Lungs:     Clear to auscultation bilaterally, respirations unlabored   Chest Wall:       Heart:    Regular rate and rhythm, S1 and S2 normal, no murmur, rub   or gallop   Breast Exam:    No tenderness, masses, or nipple abnormality   Abdomen:     Soft, non-tender, bowel sounds active all four quadrants,     no masses, no organomegaly   Genitalia:     Rectal: Extremities:   Extremities normal, atraumatic, no cyanosis or edema   Pulses:   2+ and symmetric all extremities   Skin:   Skin color, texture, turgor normal, no rashes or lesions   Lymph nodes:   Cervical, supraclavicular, and axillary nodes normal   Neurologic:    Cranial nerves intact        Invasive Devices     Peripheral Intravenous Line            Peripheral IV 01/21/19 Right Hand 1 day                Lab, Imaging and other studies: I have personally reviewed pertinent reports      VTE Pharmacologic Prophylaxis:  Lovenox  VTE Mechanical Prophylaxis: sequential compression device

## 2019-01-23 NOTE — PROGRESS NOTES
Vancomycin Assessment    Gissle Tan is a 61 y o  female who is currently receiving vancomycin 1250mg iv q8h for Pneumonia     Relevant clinical data and objective history reviewed:  Creatinine   Date Value Ref Range Status   01/20/2019 0 92 0 60 - 1 20 mg/dL Final     Comment:     Standardized to IDMS reference method   01/19/2019 1 07 0 60 - 1 20 mg/dL Final     Comment:     Standardized to IDMS reference method   03/21/2018 0 67 0 6 - 1 2 MG/DL Final     Comment:     IDMS method performed  The drugs Metamizole, Sulfasalazine, and Sulfapyridine may interfere and  result in false low results  01/24/2018 0 73 0 60 - 1 30 mg/dL Final     Comment:     Standardized to IDMS reference method   01/18/2018 0 78 0 50 - 0 99 mg/dL Final     Comment:     Result Comment: For patients >52years of age, the reference limit  for Creatinine is approximately 13% higher for people  identified as -American  /76 (BP Location: Right arm)   Pulse 74   Temp 98 6 °F (37 °C) (Temporal)   Resp 18   Ht 5' 8" (1 727 m)   Wt 106 kg (234 lb)   SpO2 96%   BMI 35 58 kg/m²   I/O last 3 completed shifts: In: 720 [P O :720]  Out: -   Lab Results   Component Value Date/Time    BUN 14 01/20/2019 05:03 AM    BUN 14 03/21/2018 01:46 PM    BUN 10 02/13/2018 10:29 AM    WBC 9 80 01/20/2019 05:03 AM    WBC 6 0 03/21/2018 01:46 PM    HGB 11 4 (L) 01/20/2019 05:03 AM    HGB 12 8 03/21/2018 01:46 PM    HCT 34 9 01/20/2019 05:03 AM    HCT 38 7 03/21/2018 01:46 PM    MCV 84 01/20/2019 05:03 AM    MCV 85 0 03/21/2018 01:46 PM     01/20/2019 05:03 AM     03/21/2018 01:46 PM     Temp Readings from Last 3 Encounters:   01/22/19 98 6 °F (37 °C) (Temporal)   01/19/19 100 °F (37 8 °C)   03/01/18 98 2 °F (36 8 °C)     Vancomycin Days of Therapy: 4    Assessment/Plan  The patient is currently on vancomycin utilizing scheduled dosing    Baseline risks associated with therapy include: pre-existing renal impairment, concomitant nephrotoxic medications, advanced age and dehydration  The patient is receiving 1250mg iv q8h with the most recent vancomycin level being at steady-state and therapeutic based on a goal of 15-20 (appropriate for most indications) ; therefore, is clinically appropriate and dose will be continued   Pharmacy will continue to follow closely for s/sx of nephrotoxicity, infusion reactions and appropriateness of therapy  BMP and CBC will be ordered per protocol  Plan for trough as patient approaches steady state, prior to the 4th  dose at approximately 1600 on 1/23/19  [facility] will continue to follow the patients culture results and clinical progress daily      Aliyah Terry, Pharmacist

## 2019-01-23 NOTE — SOCIAL WORK
Chart reviewed by case management, pt not stable for d/c , xray completed and pneumonia is worsening, pt does not have a nebulizer at home and if nbs tx's are needed upon d/c then a nebulizer will be needed, cm will continue to follow and assess for any additional d/c needs

## 2019-01-23 NOTE — CONSULTS
The patient's vancomycin therapy has been discontinued  Thank you for this consult  Pharmacy will sign off now

## 2019-01-24 LAB
BACTERIA BLD CULT: NORMAL
BACTERIA BLD CULT: NORMAL

## 2019-01-24 RX ADMIN — ENOXAPARIN SODIUM 40 MG: 40 INJECTION SUBCUTANEOUS at 10:28

## 2019-01-24 RX ADMIN — PANTOPRAZOLE SODIUM 40 MG: 40 TABLET, DELAYED RELEASE ORAL at 05:01

## 2019-01-24 RX ADMIN — LEVOFLOXACIN 500 MG: 5 INJECTION, SOLUTION INTRAVENOUS at 12:53

## 2019-01-24 RX ADMIN — GUAIFENESIN 200 MG: 200 SOLUTION ORAL at 21:16

## 2019-01-24 RX ADMIN — ENOXAPARIN SODIUM 40 MG: 40 INJECTION SUBCUTANEOUS at 21:16

## 2019-01-24 RX ADMIN — ZOLPIDEM TARTRATE 5 MG: 5 TABLET, FILM COATED ORAL at 21:17

## 2019-01-24 RX ADMIN — FENOFIBRATE 145 MG: 145 TABLET, FILM COATED ORAL at 10:28

## 2019-01-24 RX ADMIN — ACETAMINOPHEN 650 MG: 325 TABLET ORAL at 21:17

## 2019-01-24 NOTE — UTILIZATION REVIEW
Network Utilization Review Department  Phone: [phone]; Fax [phone]  [email or web address]  ATTENTION: Please call with any questions or concerns to [phone]  and carefully listen to the prompts so that you are directed to the right person  Send all requests for admission clinical reviews, approved or denied determinations and any other requests to fax [phone]  All voicemails are confidential     Continued Stay Review    Date: 1/24/19    Inpatient   Vital Signs: /57 (BP Location: Left arm)   Pulse 76   Temp (!) 96 6 °F (35 9 °C) (Tympanic)   Resp 18   Ht 5' 8" (1 727 m)   Wt 106 kg (234 lb)   SpO2 96%   BMI 35 58 kg/m²      Assessment/Plan: 62 yo fem initially admitted as INPT on 1/19/19 due to pneumonia with dehydration and generalized weakness  1/23: vanco d/c'd 1/22 converted to PO levaquin due to being afebrile today      4/10 neck pain     BLE edema   Has red rash on back        IVF have been dc'd, pushing PO fluids  Pneumonia is worsening per imaging  Not yet seen on 1/24 as of 1653       Limited information is available    Medications:   Scheduled Meds:   Current Facility-Administered Medications:  acetaminophen 650 mg Oral Q6H PRN  X 1 on 1/23   enoxaparin 40 mg Subcutaneous Q12H [address]   estradiol 1 g Vaginal Once per day on Sun Thu   fenofibrate 145 mg Oral Daily   guaiFENesin 200 mg Oral Q4H PRN  X 2 on 1/23   levofloxacin 500 mg Intravenous Q24H   pantoprazole 40 mg Oral Early Morning   pneumococcal 13-valent conjugate vaccine 0 5 mL Intramuscular Prior to discharge   zolpidem 5 mg Oral HS   Pertinent Labs/Diagnostic Results:   1/23 & 1/24 no new labs  1/23 CXR: Worsening multifocal/multi lobar pneumonia      Age/Sex: 61 y o  female   Discharge Plan: TBD    Awaiting disposition

## 2019-01-24 NOTE — NURSING NOTE
Pt sleeping most of shift, tylenol given 1 time for neck pain  No s/s of distress  Call bell is within reach

## 2019-01-24 NOTE — PROGRESS NOTES
Progress Note - Darryn Schultz 61 y o  female MRN: [id number]    Unit/Bed#: -02 Encounter: [id number]      Assessment:  The patient was examined today in her hospital room  She is resting in bed  She is awake alert oriented x3  She is pleasant and cooperative and able to assist in history taking  She has no subjective complaints specifically she denies any chest pain pleuritic-type chest pain denies any productive cough  She denies any fevers chills night sweats or rigors  Nursing at [facility] have no acute issues to report over the last 24 hours the patient was last examined by me  Medications reviewed laboratory studies reviewed  All medications appear to be appropriate for the patient's comorbidities  They also appear to be well tolerated by the patient  Reference is made to the H&P as well as other progress records for the remainder of the HPI past medical history past surgical history family history social history allergic history and review of systems which is centrally unchanged on today's examination  Chest x-ray now shows worsening of the left lingular infiltrate and now also noted is a right lower lobe infiltrate  Patient remains off oxygen saturating at 95%  Blood cultures remain negative after 4 days  1  Left lingular pneumonia   On vancomycin and Levaquin empirically   The patient is still spiking temps   Cultures negative to date   Continue empiric antibiotics  Repeat chest x-ray in the morning PA and lateral   Afebrile so far today  If patient continues afebrile will switch to p o  Antibiotics  Cultures are negative to date  2  Dehydration on admission   Improved   DC IV fluids   Push p o  Fluids     3  Diarrhea   Questionable etiology   Improved   No complaints today    4  GERD   On Protonix        Plan:  See above        Subjective:   See above    Objective:     Vitals: Blood pressure 132/62, pulse 65, temperature 98 8 °F (37 1 °C), temperature source Temporal, resp  rate 18, height 5' 8" (1 727 m), weight 106 kg (234 lb), SpO2 93 %  ,Body mass index is 35 58 kg/m²  Intake/Output Summary (Last 24 hours) at 01/23/19 1908  Last data filed at 01/22/19 2230   Gross per 24 hour   Intake              360 ml   Output                0 ml   Net              360 ml       Physical Exam: /62 (BP Location: Left arm)   Pulse 65   Temp 98 8 °F (37 1 °C) (Temporal)   Resp 18   Ht 5' 8" (1 727 m)   Wt 106 kg (234 lb)   SpO2 93%   BMI 35 58 kg/m²     General Appearance:    Alert, cooperative, no distress, appears stated age   Head:     Eyes:    Pupils are round regular and equal   Ears:     Nose:    Throat:    Neck:   Supple, symmetrical, trachea midline, no adenopathy;     thyroid:  no enlargement/tenderness/nodules; no carotid    bruit or JVD   Back:     Symmetric, no curvature, ROM normal, no CVA tenderness   Lungs:     Clear to auscultation bilaterally, respirations unlabored   Chest Wall:    No tenderness or deformity    Heart:    Regular rate and rhythm, S1 and S2 normal, no murmur, rub   or gallop   Breast Exam:       Abdomen:     Soft, non-tender, bowel sounds active all four quadrants,     no masses, no organomegaly   Genitalia:     Rectal:     Extremities:   Extremities normal, atraumatic, no cyanosis or edema   Pulses:   2+ and symmetric all extremities   Skin:   Skin color, texture, turgor normal, no rashes or lesions   Lymph nodes:   Cervical, supraclavicular, and axillary nodes normal   Neurologic:    Cranial nerves intact        Invasive Devices     Peripheral Intravenous Line            Peripheral IV 01/21/19 Left Hand 2 days                Lab, Imaging and other studies: I have personally reviewed pertinent reports      VTE Pharmacologic Prophylaxis: Enoxaparin (Lovenox)  VTE Mechanical Prophylaxis: sequential compression device

## 2019-01-25 VITALS
BODY MASS INDEX: 35.46 KG/M2 | WEIGHT: 234 LBS | HEIGHT: 68 IN | TEMPERATURE: 98.8 F | RESPIRATION RATE: 16 BRPM | SYSTOLIC BLOOD PRESSURE: 150 MMHG | DIASTOLIC BLOOD PRESSURE: 90 MMHG | HEART RATE: 65 BPM | OXYGEN SATURATION: 98 %

## 2019-01-25 RX ORDER — LEVOFLOXACIN 500 MG/1
500 TABLET, FILM COATED ORAL EVERY 24 HOURS
Qty: 7 TABLET | Refills: 0 | Status: SHIPPED | OUTPATIENT
Start: 2019-01-26 | End: 2019-02-02

## 2019-01-25 RX ORDER — LEVOFLOXACIN 500 MG/1
500 TABLET, FILM COATED ORAL EVERY 24 HOURS
Status: DISCONTINUED | OUTPATIENT
Start: 2019-01-25 | End: 2019-01-25 | Stop reason: HOSPADM

## 2019-01-25 RX ADMIN — PANTOPRAZOLE SODIUM 40 MG: 40 TABLET, DELAYED RELEASE ORAL at 05:10

## 2019-01-25 RX ADMIN — LEVOFLOXACIN 500 MG: 500 TABLET, FILM COATED ORAL at 10:35

## 2019-01-25 RX ADMIN — FENOFIBRATE 145 MG: 145 TABLET, FILM COATED ORAL at 08:36

## 2019-01-25 RX ADMIN — ENOXAPARIN SODIUM 40 MG: 40 INJECTION SUBCUTANEOUS at 08:36

## 2019-01-25 NOTE — PLAN OF CARE
DISCHARGE PLANNING     Discharge to home or other facility with appropriate resources Adequate for Discharge        DISCHARGE PLANNING - CARE MANAGEMENT     Discharge to post-acute care or home with appropriate resources Adequate for Discharge        INFECTION - ADULT     Absence or prevention of progression during hospitalization Adequate for Discharge     Absence of fever/infection during neutropenic period Adequate for Discharge        Knowledge Deficit     Patient/family/caregiver demonstrates understanding of disease process, treatment plan, medications, and discharge instructions Adequate for Discharge        Nutrition/Hydration-ADULT     Nutrient/Hydration intake appropriate for improving, restoring or maintaining nutritional needs Adequate for Discharge        PAIN - ADULT     Verbalizes/displays adequate comfort level or baseline comfort level Adequate for Discharge        Potential for Falls     Patient will remain free of falls Adequate for Discharge        SAFETY ADULT     Maintain or return to baseline ADL function Adequate for Discharge     Maintain or return mobility status to optimal level Adequate for Discharge     Patient will remain free of falls Adequate for Discharge

## 2019-01-25 NOTE — PROGRESS NOTES
Progress Note Fidelia Mace 61 y o  female MRN: [id number]    Unit/Bed#: -02 Encounter: [id number]      Assessment:  1  Left lingular pneumonia   On vancomycin and Levaquin empirically   The patient is still spiking temps   Cultures negative to date   Continue empiric antibiotics   Repeat chest x-ray in the morning PA and lateral   Afebrile so far today   If patient continues afebrile will switch to p o  Antibiotics   Cultures are negative to date  2  Dehydration on admission   Improved   DC IV fluids   Push p o  Fluids     3  Diarrhea   Questionable etiology   Improved   No complaints today  4  GERD   On Protonix      Plan:  See above    Subjective:   No subjective complaint    Objective:     Vitals: Blood pressure 160/86, pulse 66, temperature 98 2 °F (36 8 °C), temperature source Temporal, resp  rate 16, height 5' 8" (1 727 m), weight 106 kg (234 lb), SpO2 92 %  ,Body mass index is 35 58 kg/m²      No intake or output data in the 24 hours ending 01/25/19 1505    Physical Exam: /90 (BP Location: Left arm)   Pulse 65   Temp 98 8 °F (37 1 °C) (Temporal)   Resp 16   Ht 5' 8" (1 727 m)   Wt 106 kg (234 lb)   SpO2 98%   BMI 35 58 kg/m²     General Appearance:    Alert, cooperative, no distress, appears stated age   Head:    Normocephalic, without obvious abnormality, atraumatic   Eyes:    PERRL, conjunctiva/corneas clear, EOM's intact, fundi     benign, both eyes   Ears:    Normal TM's and external ear canals, both ears   Nose:   Nares normal, septum midline, mucosa normal, no drainage    or sinus tenderness   Throat:   Lips, mucosa, and tongue normal; teeth and gums normal   Neck:   Supple, symmetrical, trachea midline, no adenopathy;     thyroid:  no enlargement/tenderness/nodules; no carotid    bruit or JVD   Back:     Symmetric, no curvature, ROM normal, no CVA tenderness   Lungs:     Clear to auscultation bilaterally, respirations unlabored   Chest Wall:    No tenderness or deformity Heart:    Regular rate and rhythm, S1 and S2 normal, no murmur, rub   or gallop   Breast Exam:       Abdomen:     Soft, non-tender, bowel sounds active all four quadrants,     no masses, no organomegaly   Genitalia:     Rectal:     Extremities:   Extremities normal, atraumatic, no cyanosis or edema   Pulses:   2+ and symmetric all extremities   Skin:   Skin color, texture, turgor normal, no rashes or lesions   Lymph nodes:   Cervical, supraclavicular, and axillary nodes normal   Neurologic:   Cranial nerves intact        Invasive Devices     Peripheral Intravenous Line            Peripheral IV 01/24/19 Right Hand 1 day                Lab, Imaging and other studies: I have personally reviewed pertinent reports      VTE Pharmacologic Prophylaxis: Enoxaparin (Lovenox)  VTE Mechanical Prophylaxis: sequential compression device

## 2019-01-25 NOTE — DISCHARGE SUMMARY
Discharging Physician / Practitioner: Scott Franco MD  PCP: Scott Franco MD  Admission Date:   Admission Orders     Ordered        01/19/19 1432  Inpatient Admission  Once             Discharge Date: 01/25/19    Resolved Problems  Date Reviewed: 1/25/2019    None          Consultations During Hospital Stay:  · None    Procedures Performed:   · None    Significant Findings / Test Results:   · Chest x-ray    Incidental Findings:   · None     Test Results Pending at Discharge (will require follow up): · None     Outpatient Tests Requested:  · None    Complications:  None    Reason for Admission:  Pneumonia    Hospital Course:     Juanita Aranda is a 61 y o  female patient who originally presented to the hospital on 1/19/2019 due to pneumonia  Please see above list of diagnoses and related plan for additional information  Condition at Discharge: stable     Discharge Day Visit / Exam:     Subjective:  No subjective complaint  Vitals: Blood Pressure: 150/90 (01/25/19 1512)  Pulse: 65 (01/25/19 1512)  Temperature: 98 8 °F (37 1 °C) (01/25/19 1512)  Temp Source: Temporal (01/25/19 1512)  Respirations: 16 (01/25/19 1512)  Height: 5' 8" (172 7 cm) (01/19/19 1354)  Weight - Scale: 106 kg (234 lb) (01/19/19 1354)  SpO2: 98 % (01/25/19 1512)  Exam:   Physical Exam   Constitutional: She is oriented to person, place, and time  She appears well-developed and well-nourished  HENT:   Head: Normocephalic and atraumatic  Eyes: Pupils are equal, round, and reactive to light  Conjunctivae and EOM are normal    Neck: Normal range of motion  Neck supple  Cardiovascular: Normal rate and regular rhythm  Pulmonary/Chest: Effort normal and breath sounds normal  She has no wheezes  She has no rales  Abdominal: Soft  Bowel sounds are normal    Musculoskeletal: Normal range of motion  Neurological: She is alert and oriented to person, place, and time  She has normal reflexes  Skin: Skin is warm and dry     Psychiatric: She has a normal mood and affect  Her behavior is normal  Judgment and thought content normal    Nursing note and vitals reviewed  Discussion with Family:  No    Discharge instructions/Information to patient and family:   See after visit summary for information provided to patient and family  Provisions for Follow-Up Care:  See after visit summary for information related to follow-up care and any pertinent home health orders  Disposition:     Home    For Discharges to [facility] SNF:   · Not Applicable to this Patient - Not Applicable to this Patient    Planned Readmission:  Now     Discharge Statement:  I spent 45 minutes discharging the patient  This time was spent on the day of discharge  I had direct contact with the patient on the day of discharge  Greater than 50% of the total time was spent examining patient, answering all patient questions, arranging and discussing plan of care with patient as well as directly providing post-discharge instructions  Additional time then spent on discharge activities  Discharge Medications:  See after visit summary for reconciled discharge medications provided to patient and family  ** Please Note: This note has been constructed using a voice recognition system **    Discharge- Render Gene 1955, 61 y o  female MRN: [id number]    Unit/Bed#: -02 Encounter: [id number]    Primary Care Provider: Sumanth Griffith MD   Date and time admitted to hospital: 1/19/2019 12:56 PM        No new Assessment & Plan notes have been filed under this hospital service since the last note was generated  Service: Nephrology              Resolved Problems  Date Reviewed: 1/25/2019    None          Admission Date:   Admission Orders     Ordered        01/19/19 1432  Inpatient Admission  Once               Admitting Diagnosis: Pneumonia [J18 9]    HPI:  51-year-old female presents with weakness and cough    Workup reveals evidence of left lingular pneumonia  Patient admitted for further evaluation and treatment  Procedures Performed: No orders of the defined types were placed in this encounter  Summary of Hospital Course:  71-year-old female presents with weakness, cough  Workup revealed evidence of left lingular pneumonia  Patient empirically started on vancomycin and Levaquin  Follow-up x-rays revealed evidence of right lower lobe involvement as well  Throughout the course of admission laboratory studies were unremarkable  Cultures remain negative  Significant improvement was noted full subjectively and objectively  Patient was afebrile times 48 hr  She was ambulating in the halls, eating regular diet and was stable for discharge  The patient will be maintained on p o  Levaquin 500 mg x7 days  Patient was given a follow-up appointment in the office of Dr Duncan Pearson in 1 week  Patient is to call Dr Duncan Pearson for any worsening symptoms of fevers chills night sweats or rigors weakness etc   The patient was discharged in stable condition    Significant Findings, Care, Treatment and Services Provided:  Chest x-ray report    Complications:  None    Condition at Discharge: stable         Discharge instructions/Information to patient and family:   See after visit summary for information provided to patient and family  Provisions for Follow-Up Care:  See after visit summary for information related to follow-up care and any pertinent home health orders  PCP: Scott Franco MD    Disposition: Home    Planned Readmission: No    Discharge Statement   I spent 45 minutes discharging the patient  This time was spent on the day of discharge  I had direct contact with the patient on the day of discharge  Additional documentation is required if more than 30 minutes were spent on discharge  Discharge Medications:  See after visit summary for reconciled discharge medications provided to patient and family

## 2019-01-25 NOTE — PROGRESS NOTES
Progress Note Kala Mace 61 y o  female MRN: [id number]    Unit/Bed#: -02 Encounter: [id number]      Assessment  1  Left lobar versus lingular pneumonia  On empiric antibiotics with Levaquin patient afebrile times 36 hours  No subjective complaint  Specifically no fevers chills night sweats or rigors  No chest pain or pleuritic-type chest pain or productive cough  2  End-stage renal disease on chronic maintenance hemodialysis  Patient appears to be well dialyzed and is euvolemic  3  Diarrheal stool today/fall today  No injury after fall  Will check C diff toxin if diarrhea persists  No complaint of diarrhea yesterday  Plan:  See above    Subjective:   No subjective complaint    Objective:     Vitals: Blood pressure 132/57, pulse 76, temperature (!) 96 6 °F (35 9 °C), temperature source Tympanic, resp  rate 18, height 5' 8" (1 727 m), weight 106 kg (234 lb), SpO2 96 %  ,Body mass index is 35 58 kg/m²      No intake or output data in the 24 hours ending 01/24/19 2032    Physical Exam: /57 (BP Location: Left arm)   Pulse 76   Temp (!) 96 6 °F (35 9 °C) (Tympanic)   Resp 18   Ht 5' 8" (1 727 m)   Wt 106 kg (234 lb)   SpO2 96%   BMI 35 58 kg/m²     General Appearance:    Alert, cooperative, no distress, appears stated age   Head:    Normocephalic, without obvious abnormality, atraumatic   Eyes:    PERRL, conjunctiva/corneas clear, EOM's intact, fundi     benign, both eyes   Ears:    Normal TM's and external ear canals, both ears   Nose:   Nares normal, septum midline, mucosa normal, no drainage    or sinus tenderness   Throat:   Lips, mucosa, and tongue normal; teeth and gums normal   Neck:   Supple, symmetrical, trachea midline, no adenopathy;     thyroid:  no enlargement/tenderness/nodules; no carotid    bruit or JVD   Back:     Symmetric, no curvature, ROM normal, no CVA tenderness   Lungs:     Clear to auscultation bilaterally, respirations unlabored   Chest Wall:    No tenderness or deformity    Heart:    Regular rate and rhythm, S1 and S2 normal, no murmur, rub   or gallop   Breast Exam:       Abdomen:     Soft, non-tender, bowel sounds active all four quadrants,     no masses, no organomegaly   Genitalia:     Rectal:     Extremities:   Extremities normal, atraumatic, no cyanosis or edema   Pulses:   2+ and symmetric all extremities   Skin:   Skin color, texture, turgor normal, no rashes or lesions   Lymph nodes:   Cervical, supraclavicular, and axillary nodes normal   Neurologic:   CNII-XII intact, normal strength, sensation and reflexes     throughout        Invasive Devices     Peripheral Intravenous Line            Peripheral IV 01/24/19 Right Hand less than 1 day                Lab, Imaging and other studies: I have personally reviewed pertinent reports

## 2019-01-25 NOTE — SOCIAL WORK
Chart reviewed by case management, d/c order has been written, pt denies any d/c needs, pt's sister will transport the pt home, pt in agreement with the d/c  And d/c plan home, d/c plan was discussed at care coordination rounds

## 2019-01-25 NOTE — NURSING NOTE
Pt discharged home  Vss  Pt denies pain, denies shortness of breath  IV removed without complications  Discharge instructions read and explained to pt, all questions answered  All belongings with pt  Pt escorted to front entrance via w/c by RN and assisted into car of

## 2019-01-28 NOTE — UTILIZATION REVIEW
Notification of Discharge  This is a Notification of Discharge from our facility [address]  Please be advised that this patient has been discharge from our facility  Below you will find the admission and discharge date and time including the patients disposition  PRESENTATION DATE: 1/19/2019 12:56 PM  IP ADMISSION DATE: 1/19/19 1256  DISCHARGE DATE: 1/25/2019  4:26 PM  DISPOSITION: [address] Utilization Review Department  Phone: [phone]; Fax [phone]  [email or web address]  ATTENTION: Please call with any questions or concerns to [phone]  and carefully listen to the prompts so that you are directed to the right person  Send all requests for admission clinical reviews, approved or denied determinations and any other requests to fax [phone]   All voicemails are confidential

## 2019-05-01 ENCOUNTER — TRANSCRIBE ORDERS (OUTPATIENT)
Dept: ADMINISTRATIVE | Facility: HOSPITAL | Age: 64
End: 2019-05-01

## 2019-05-01 ENCOUNTER — HOSPITAL ENCOUNTER (OUTPATIENT)
Dept: RADIOLOGY | Facility: HOSPITAL | Age: 64
Discharge: HOME | End: 2019-05-01
Attending: INTERNAL MEDICINE
Payer: COMMERCIAL

## 2019-05-01 DIAGNOSIS — R05.9 COUGH: ICD-10-CM

## 2019-05-01 DIAGNOSIS — R05.9 COUGH: Primary | ICD-10-CM

## 2019-05-01 PROCEDURE — 71046 X-RAY EXAM CHEST 2 VIEWS: CPT

## 2019-05-03 ENCOUNTER — TRANSCRIBE ORDERS (OUTPATIENT)
Dept: ADMINISTRATIVE | Facility: HOSPITAL | Age: 64
End: 2019-05-03

## 2019-05-03 DIAGNOSIS — M54.2 CERVICALGIA: Primary | ICD-10-CM

## 2019-05-21 ENCOUNTER — HOSPITAL ENCOUNTER (OUTPATIENT)
Dept: MRI IMAGING | Facility: HOSPITAL | Age: 64
Discharge: HOME | End: 2019-05-21
Payer: COMMERCIAL

## 2019-05-21 DIAGNOSIS — M54.2 CERVICALGIA: ICD-10-CM

## 2019-05-21 PROCEDURE — 72141 MRI NECK SPINE W/O DYE: CPT

## 2019-05-31 ENCOUNTER — APPOINTMENT (EMERGENCY)
Dept: CT IMAGING | Facility: HOSPITAL | Age: 64
End: 2019-05-31
Payer: COMMERCIAL

## 2019-05-31 ENCOUNTER — HOSPITAL ENCOUNTER (EMERGENCY)
Facility: HOSPITAL | Age: 64
Discharge: HOME | End: 2019-05-31
Attending: EMERGENCY MEDICINE | Admitting: EMERGENCY MEDICINE
Payer: COMMERCIAL

## 2019-05-31 ENCOUNTER — APPOINTMENT (EMERGENCY)
Dept: RADIOLOGY | Facility: HOSPITAL | Age: 64
End: 2019-05-31
Payer: COMMERCIAL

## 2019-05-31 VITALS
SYSTOLIC BLOOD PRESSURE: 118 MMHG | WEIGHT: 230 LBS | RESPIRATION RATE: 22 BRPM | DIASTOLIC BLOOD PRESSURE: 68 MMHG | BODY MASS INDEX: 34.86 KG/M2 | OXYGEN SATURATION: 97 % | HEART RATE: 72 BPM | HEIGHT: 68 IN | TEMPERATURE: 97.7 F

## 2019-05-31 DIAGNOSIS — R10.9 ABDOMINAL PAIN: ICD-10-CM

## 2019-05-31 DIAGNOSIS — K65.4 MESENTERIC PANNICULITIS (HCC): Primary | ICD-10-CM

## 2019-05-31 LAB
ALBUMIN SERPL BCP-MCNC: 4.3 G/DL (ref 3.5–5.7)
ALP SERPL-CCNC: 52 U/L (ref 55–165)
ALT SERPL W P-5'-P-CCNC: 39 U/L (ref 7–52)
ANION GAP SERPL CALCULATED.3IONS-SCNC: 5 MMOL/L (ref 4–13)
APTT PPP: 34 SECONDS (ref 26–38)
AST SERPL W P-5'-P-CCNC: 46 U/L (ref 13–39)
ATRIAL RATE: 76 BPM
BACTERIA UR QL AUTO: ABNORMAL /HPF
BASOPHILS # BLD AUTO: 0.1 THOUSANDS/ΜL (ref 0–0.1)
BASOPHILS NFR BLD AUTO: 1 % (ref 0–2)
BILIRUB SERPL-MCNC: 0.4 MG/DL (ref 0.2–1)
BILIRUB UR QL STRIP: NEGATIVE
BUN SERPL-MCNC: 12 MG/DL (ref 7–25)
CALCIUM SERPL-MCNC: 9.8 MG/DL (ref 8.6–10.5)
CAOX CRY URNS QL MICRO: ABNORMAL /HPF
CHLORIDE SERPL-SCNC: 108 MMOL/L (ref 98–107)
CLARITY UR: ABNORMAL
CO2 SERPL-SCNC: 28 MMOL/L (ref 21–31)
COLOR UR: YELLOW
CREAT SERPL-MCNC: 0.7 MG/DL (ref 0.6–1.2)
EOSINOPHIL # BLD AUTO: 0.2 THOUSAND/ΜL (ref 0–0.61)
EOSINOPHIL NFR BLD AUTO: 3 % (ref 0–5)
ERYTHROCYTE [DISTWIDTH] IN BLOOD BY AUTOMATED COUNT: 14.8 % (ref 11.5–14.5)
GFR SERPL CREATININE-BSD FRML MDRD: 92 ML/MIN/1.73SQ M
GLUCOSE SERPL-MCNC: 89 MG/DL (ref 65–99)
GLUCOSE UR STRIP-MCNC: NEGATIVE MG/DL
HCT VFR BLD AUTO: 38.6 % (ref 42–47)
HGB BLD-MCNC: 12.6 G/DL (ref 12–16)
HGB UR QL STRIP.AUTO: ABNORMAL
INR PPP: 1.13 (ref 0.9–1.5)
KETONES UR STRIP-MCNC: NEGATIVE MG/DL
LEUKOCYTE ESTERASE UR QL STRIP: NEGATIVE
LIPASE SERPL-CCNC: 19 U/L (ref 11–82)
LYMPHOCYTES # BLD AUTO: 1.6 THOUSANDS/ΜL (ref 0.6–4.47)
LYMPHOCYTES NFR BLD AUTO: 30 % (ref 21–51)
MCH RBC QN AUTO: 27.3 PG (ref 26–34)
MCHC RBC AUTO-ENTMCNC: 32.7 G/DL (ref 31–37)
MCV RBC AUTO: 83 FL (ref 81–99)
MONOCYTES # BLD AUTO: 1 THOUSAND/ΜL (ref 0.17–1.22)
MONOCYTES NFR BLD AUTO: 20 % (ref 2–12)
NEUTROPHILS # BLD AUTO: 2.4 THOUSANDS/ΜL (ref 1.4–6.5)
NEUTS SEG NFR BLD AUTO: 46 % (ref 42–75)
NITRITE UR QL STRIP: NEGATIVE
NON-SQ EPI CELLS URNS QL MICRO: ABNORMAL /HPF
P AXIS: 57 DEGREES
PH UR STRIP.AUTO: 5.5 [PH]
PLATELET # BLD AUTO: 257 THOUSANDS/UL (ref 149–390)
PMV BLD AUTO: 8.4 FL (ref 8.6–11.7)
POTASSIUM SERPL-SCNC: 4.1 MMOL/L (ref 3.5–5.5)
PR INTERVAL: 152 MS
PROT SERPL-MCNC: 7.9 G/DL (ref 6.4–8.9)
PROT UR STRIP-MCNC: ABNORMAL MG/DL
PROTHROMBIN TIME: 13.1 SECONDS (ref 10.2–13)
QRS AXIS: 0 DEGREES
QRSD INTERVAL: 90 MS
QT INTERVAL: 394 MS
QTC INTERVAL: 443 MS
RBC # BLD AUTO: 4.63 MILLION/UL (ref 3.9–5.2)
RBC #/AREA URNS AUTO: ABNORMAL /HPF
SODIUM SERPL-SCNC: 141 MMOL/L (ref 134–143)
SP GR UR STRIP.AUTO: >=1.03 (ref 1–1.03)
T WAVE AXIS: 56 DEGREES
TROPONIN I SERPL-MCNC: <0.03 NG/ML
UROBILINOGEN UR QL STRIP.AUTO: 0.2 E.U./DL
VENTRICULAR RATE: 76 BPM
WBC # BLD AUTO: 5.2 THOUSAND/UL (ref 4.8–10.8)
WBC #/AREA URNS AUTO: ABNORMAL /HPF

## 2019-05-31 PROCEDURE — 71045 X-RAY EXAM CHEST 1 VIEW: CPT

## 2019-05-31 PROCEDURE — 36415 COLL VENOUS BLD VENIPUNCTURE: CPT | Performed by: PHYSICIAN ASSISTANT

## 2019-05-31 PROCEDURE — 81001 URINALYSIS AUTO W/SCOPE: CPT | Performed by: PHYSICIAN ASSISTANT

## 2019-05-31 PROCEDURE — 93010 ELECTROCARDIOGRAM REPORT: CPT | Performed by: INTERNAL MEDICINE

## 2019-05-31 PROCEDURE — 99285 EMERGENCY DEPT VISIT HI MDM: CPT

## 2019-05-31 PROCEDURE — 74177 CT ABD & PELVIS W/CONTRAST: CPT

## 2019-05-31 PROCEDURE — 85025 COMPLETE CBC W/AUTO DIFF WBC: CPT | Performed by: PHYSICIAN ASSISTANT

## 2019-05-31 PROCEDURE — 85610 PROTHROMBIN TIME: CPT | Performed by: PHYSICIAN ASSISTANT

## 2019-05-31 PROCEDURE — 84484 ASSAY OF TROPONIN QUANT: CPT | Performed by: PHYSICIAN ASSISTANT

## 2019-05-31 PROCEDURE — 93005 ELECTROCARDIOGRAM TRACING: CPT

## 2019-05-31 PROCEDURE — 80053 COMPREHEN METABOLIC PANEL: CPT | Performed by: PHYSICIAN ASSISTANT

## 2019-05-31 PROCEDURE — 85730 THROMBOPLASTIN TIME PARTIAL: CPT | Performed by: PHYSICIAN ASSISTANT

## 2019-05-31 PROCEDURE — 83690 ASSAY OF LIPASE: CPT | Performed by: PHYSICIAN ASSISTANT

## 2019-05-31 RX ORDER — TRAMADOL HYDROCHLORIDE 50 MG/1
TABLET ORAL
Qty: 20 TABLET | Refills: 0 | Status: SHIPPED | OUTPATIENT
Start: 2019-05-31

## 2019-05-31 RX ADMIN — IOHEXOL 100 ML: 350 INJECTION, SOLUTION INTRAVENOUS at 13:27

## 2019-06-25 ENCOUNTER — TRANSCRIBE ORDERS (OUTPATIENT)
Dept: ADMINISTRATIVE | Facility: HOSPITAL | Age: 64
End: 2019-06-25

## 2019-06-25 DIAGNOSIS — R10.13 EPIGASTRIC PAIN: Primary | ICD-10-CM

## 2019-07-01 ENCOUNTER — HOSPITAL ENCOUNTER (OUTPATIENT)
Dept: RADIOLOGY | Facility: HOSPITAL | Age: 64
Discharge: HOME | End: 2019-07-01
Payer: COMMERCIAL

## 2019-07-01 DIAGNOSIS — R10.13 EPIGASTRIC PAIN: ICD-10-CM

## 2019-07-01 PROCEDURE — 74245 HB X-RAY UPPER GI&SMALL INTEST: CPT

## 2019-07-27 ENCOUNTER — APPOINTMENT (EMERGENCY)
Dept: CT IMAGING | Facility: HOSPITAL | Age: 64
End: 2019-07-27
Payer: COMMERCIAL

## 2019-07-27 ENCOUNTER — HOSPITAL ENCOUNTER (EMERGENCY)
Facility: HOSPITAL | Age: 64
Discharge: HOME | End: 2019-07-27
Attending: EMERGENCY MEDICINE
Payer: COMMERCIAL

## 2019-07-27 VITALS
DIASTOLIC BLOOD PRESSURE: 72 MMHG | RESPIRATION RATE: 18 BRPM | HEART RATE: 60 BPM | WEIGHT: 235 LBS | SYSTOLIC BLOOD PRESSURE: 154 MMHG | HEIGHT: 68 IN | TEMPERATURE: 98 F | OXYGEN SATURATION: 96 % | BODY MASS INDEX: 35.61 KG/M2

## 2019-07-27 DIAGNOSIS — R42 LIGHTHEADEDNESS: ICD-10-CM

## 2019-07-27 DIAGNOSIS — R42 DIZZINESS: Primary | ICD-10-CM

## 2019-07-27 LAB
ANION GAP SERPL CALCULATED.3IONS-SCNC: 8 MMOL/L (ref 4–13)
BASOPHILS # BLD AUTO: 0.1 THOUSANDS/ΜL (ref 0–0.1)
BASOPHILS NFR BLD AUTO: 1 % (ref 0–2)
BUN SERPL-MCNC: 17 MG/DL (ref 7–25)
CALCIUM SERPL-MCNC: 9.6 MG/DL (ref 8.6–10.5)
CHLORIDE SERPL-SCNC: 107 MMOL/L (ref 98–107)
CO2 SERPL-SCNC: 28 MMOL/L (ref 21–31)
CREAT SERPL-MCNC: 0.77 MG/DL (ref 0.6–1.2)
EOSINOPHIL # BLD AUTO: 0.1 THOUSAND/ΜL (ref 0–0.61)
EOSINOPHIL NFR BLD AUTO: 2 % (ref 0–5)
ERYTHROCYTE [DISTWIDTH] IN BLOOD BY AUTOMATED COUNT: 15.6 % (ref 11.5–14.5)
GFR SERPL CREATININE-BSD FRML MDRD: 82 ML/MIN/1.73SQ M
GLUCOSE SERPL-MCNC: 92 MG/DL (ref 65–99)
HCT VFR BLD AUTO: 38.4 % (ref 42–47)
HGB BLD-MCNC: 12.6 G/DL (ref 12–16)
LYMPHOCYTES # BLD AUTO: 3.4 THOUSANDS/ΜL (ref 0.6–4.47)
LYMPHOCYTES NFR BLD AUTO: 39 % (ref 21–51)
MCH RBC QN AUTO: 27.8 PG (ref 26–34)
MCHC RBC AUTO-ENTMCNC: 32.8 G/DL (ref 31–37)
MCV RBC AUTO: 85 FL (ref 81–99)
MONOCYTES # BLD AUTO: 0.9 THOUSAND/ΜL (ref 0.17–1.22)
MONOCYTES NFR BLD AUTO: 10 % (ref 2–12)
NEUTROPHILS # BLD AUTO: 4.2 THOUSANDS/ΜL (ref 1.4–6.5)
NEUTS SEG NFR BLD AUTO: 48 % (ref 42–75)
PLATELET # BLD AUTO: 306 THOUSANDS/UL (ref 149–390)
PMV BLD AUTO: 8.4 FL (ref 8.6–11.7)
POTASSIUM SERPL-SCNC: 3.6 MMOL/L (ref 3.5–5.5)
RBC # BLD AUTO: 4.53 MILLION/UL (ref 3.9–5.2)
SODIUM SERPL-SCNC: 143 MMOL/L (ref 134–143)
WBC # BLD AUTO: 8.8 THOUSAND/UL (ref 4.8–10.8)

## 2019-07-27 PROCEDURE — 36415 COLL VENOUS BLD VENIPUNCTURE: CPT | Performed by: EMERGENCY MEDICINE

## 2019-07-27 PROCEDURE — 80048 BASIC METABOLIC PNL TOTAL CA: CPT | Performed by: EMERGENCY MEDICINE

## 2019-07-27 PROCEDURE — 70450 CT HEAD/BRAIN W/O DYE: CPT

## 2019-07-27 PROCEDURE — 93005 ELECTROCARDIOGRAM TRACING: CPT

## 2019-07-27 PROCEDURE — 99284 EMERGENCY DEPT VISIT MOD MDM: CPT

## 2019-07-27 PROCEDURE — 85025 COMPLETE CBC W/AUTO DIFF WBC: CPT | Performed by: EMERGENCY MEDICINE

## 2019-07-27 RX ORDER — MECLIZINE HCL 12.5 MG/1
50 TABLET ORAL ONCE
Status: DISCONTINUED | OUTPATIENT
Start: 2019-07-27 | End: 2019-07-27 | Stop reason: HOSPADM

## 2019-07-27 RX ORDER — MECLIZINE HYDROCHLORIDE 25 MG/1
25 TABLET ORAL 3 TIMES DAILY PRN
Qty: 30 TABLET | Refills: 0 | Status: SHIPPED | OUTPATIENT
Start: 2019-07-27 | End: 2019-07-27 | Stop reason: SDUPTHER

## 2019-07-27 RX ORDER — ZOLPIDEM TARTRATE 10 MG/1
1 TABLET ORAL
COMMUNITY
Start: 2019-04-29

## 2019-07-27 RX ORDER — MECLIZINE HYDROCHLORIDE 25 MG/1
25 TABLET ORAL 3 TIMES DAILY PRN
Qty: 21 TABLET | Refills: 0 | Status: SHIPPED | OUTPATIENT
Start: 2019-07-27 | End: 2019-08-17

## 2019-07-27 NOTE — ED PROVIDER NOTES
History  Chief Complaint   Patient presents with    Dizziness    Nausea     29-year-old female with a history of sleep apnea and obesity presents to the emergency department with with 3-4 days of sense of being off balance and dizziness, worse with associated with occasional nausea  She denies any vomiting  There has been no change in her bowel or bladder habits  She states that she has not had a cough nor a runny nose nor sore throat  She reports no chest pain or shortness of breath  And she denies any melena hematochezia or hemoptysis  Prior to Admission Medications   Prescriptions Last Dose Informant Patient Reported? Taking?   estradiol (ESTRACE) 0 1 mg/g vaginal cream 7/26/2019 at Unknown time  No Yes   Sig: Insert 1 g into the vagina 2 (two) times a week   Patient taking differently: Insert 1 g into the vagina 2 (two) times a week Thursday and Sunday    fenofibrate (TRICOR) 145 mg tablet 7/26/2019 at Unknown time Self Yes Yes   Sig: Take 145 mg by mouth daily   omeprazole (PriLOSEC) 20 mg delayed release capsule Not Taking at Unknown time Self Yes No   Sig: Take 20 mg by mouth daily   traMADol (ULTRAM) 50 mg tablet Past Week at Unknown time  No Yes   Sig: Take 1-2 tabs p  O  Daily Q 6 hours p r n   Pain    zolpidem (AMBIEN) 10 mg tablet 7/26/2019 at Unknown time  Yes Yes   Sig: Take 1 tablet by mouth daily at bedtime      Facility-Administered Medications: None       Past Medical History:   Diagnosis Date    CPAP (continuous positive airway pressure) dependence     does not use machine    DJD (degenerative joint disease)     Gait abnormality     GERD (gastroesophageal reflux disease)     History of transfusion     Hyperlipidemia     Obesity (BMI 35 0-39 9 without comorbidity)     Sleep apnea        Past Surgical History:   Procedure Laterality Date    BLADDER SUSPENSION      CHOLECYSTECTOMY      COLONOSCOPY      CYSTOSCOPY N/A 1/23/2018    Procedure: CYSTOSCOPY;  Surgeon: Severo Norway Erica Magana MD;  [facility]: [facility];  Service: Gynecology Oncology    JOINT REPLACEMENT Bilateral     NJ HYSTEROSCOPY,W/ENDO BX N/A 12/1/2017    Procedure: DILATATION AND CURETTAGE (D&C) WITH HYSTEROSCOPY; POLYPECTOMY;  Surgeon: Gabriel Fallon MD;  Location: [facility];  Service: Gynecology    NJ LAPAROSCOPY W TOT HYSTERECTUTERUS <=250 [address]ater  W TUBE/OVARY N/A 1/23/2018    Procedure: ROBOTIC ASSISTED TOTAL LAPAROSCOPIC HYSTERECTOMY; BILATERAL SALPINGOOPHERECTOMY;  Surgeon: Patrice Palencia MD;  Location: [facility];  Service: Gynecology Oncology    REPLACEMENT TOTAL KNEE BILATERAL      ROTATOR CUFF REPAIR Left     TUBAL LIGATION         History reviewed  No pertinent family history  I have reviewed and agree with the history as documented  Social History     Tobacco Use    Smoking status: Never Smoker    Smokeless tobacco: Never Used   Substance Use Topics    Alcohol use: No    Drug use: No        Review of Systems   Constitutional: Negative for chills and fever  HENT: Negative for ear pain, rhinorrhea and sore throat  Eyes: Negative for pain, redness and visual disturbance  Respiratory: Positive for cough and shortness of breath  Cardiovascular: Negative for chest pain and leg swelling  Gastrointestinal: Positive for abdominal pain  Negative for diarrhea, nausea and vomiting  Genitourinary: Negative for dysuria, flank pain, frequency and urgency  Musculoskeletal: Negative for back pain, myalgias and neck pain  Skin: Negative for rash  Neurological: Negative for dizziness, weakness, light-headedness and headaches  Hematological: Negative  Psychiatric/Behavioral: Negative for agitation, confusion and suicidal ideas  The patient is not nervous/anxious  All other systems reviewed and are negative  Physical Exam  Physical Exam   Constitutional: She is oriented to person, place, and time  This is an obese female no acute distress     HENT:   Nose: Nose normal  Mouth/Throat: Oropharynx is clear and moist  No oropharyngeal exudate  Eyes: Pupils are equal, round, and reactive to light  Conjunctivae and EOM are normal  No scleral icterus  Neck: Normal range of motion  Neck supple  No JVD present  No tracheal deviation present  Cardiovascular: Normal rate, regular rhythm and normal heart sounds  No murmur heard  Pulmonary/Chest: Effort normal and breath sounds normal  No respiratory distress  She has no wheezes  She has no rales  Abdominal: Soft  Bowel sounds are normal  There is no tenderness  There is no guarding  Musculoskeletal: Normal range of motion  She exhibits no edema or tenderness  Neurological: She is alert and oriented to person, place, and time  No cranial nerve deficit or sensory deficit  She exhibits normal muscle tone  5/5 motor, nl sens  The dizziness is induced by head rotation  Skin: Skin is warm and dry  Psychiatric: She has a normal mood and affect  Her behavior is normal    Nursing note and vitals reviewed        Vital Signs  ED Triage Vitals [07/27/19 1442]   Temperature Pulse Respirations Blood Pressure SpO2   98 °F (36 7 °C) 60 18 154/72 96 %      Temp Source Heart Rate Source Patient Position - Orthostatic VS BP Location FiO2 (%)   Temporal Monitor Sitting Left arm --      Pain Score       5           Vitals:    07/27/19 1442   BP: 154/72   Pulse: 60   Patient Position - Orthostatic VS: Sitting         Visual Acuity      ED Medications  Medications - No data to display    Diagnostic Studies  Results Reviewed     None                 No orders to display              Procedures  ECG 12 Lead Documentation Only  Date/Time: 7/27/2019 3:25 PM  Performed by: Deloris La MD  Authorized by: Deloris La MD     ECG reviewed by me, the ED Provider: yes    Patient location:  ED  Previous ECG:     Previous ECG:  Unavailable    Comparison to cardiac monitor: No    Interpretation:     Interpretation: abnormal    Rate:     ECG rate:  55    ECG rate assessment: bradycardic    Rhythm:     Rhythm: sinus bradycardia    Ectopy:     Ectopy: none    QRS:     QRS axis:  Normal  Conduction:     Conduction: normal    ST segments:     ST segments:  Normal  T waves:     T waves: normal             ED Course                               MDM  Number of Diagnoses or Management Options  Diagnosis management comments: Patient's studies have returned unremarkable she does have follow-up scheduled  She is discharged her with a prescription for meclizine  Disposition  Final diagnoses:   None     ED Disposition     None      Follow-up Information    None         Patient's Medications   Discharge Prescriptions    No medications on file     No discharge procedures on file      ED Provider  Electronically Signed by           Mary Jcaobson MD  07/27/19 1502       Mary Jacobson MD  07/27/19 [address] Natalie Diaz MD  07/27/19 9475

## 2019-07-28 LAB
ATRIAL RATE: 56 BPM
P AXIS: 36 DEGREES
PR INTERVAL: 158 MS
QRS AXIS: 5 DEGREES
QRSD INTERVAL: 90 MS
QT INTERVAL: 444 MS
QTC INTERVAL: 428 MS
T WAVE AXIS: 49 DEGREES
VENTRICULAR RATE: 56 BPM

## 2019-07-28 PROCEDURE — 93010 ELECTROCARDIOGRAM REPORT: CPT | Performed by: INTERNAL MEDICINE

## 2019-08-06 ENCOUNTER — TRANSCRIBE ORDERS (OUTPATIENT)
Dept: PHYSICAL THERAPY | Age: 64
End: 2019-08-06

## 2019-08-06 ENCOUNTER — EVALUATION (OUTPATIENT)
Dept: PHYSICAL THERAPY | Age: 64
End: 2019-08-06
Payer: COMMERCIAL

## 2019-08-06 DIAGNOSIS — M75.41 ROTATOR CUFF IMPINGEMENT SYNDROME, RIGHT: ICD-10-CM

## 2019-08-06 DIAGNOSIS — M25.511 RIGHT SHOULDER PAIN, UNSPECIFIED CHRONICITY: Primary | ICD-10-CM

## 2019-08-06 PROCEDURE — G8990 OTHER PT/OT CURRENT STATUS: HCPCS | Performed by: PHYSICAL THERAPIST

## 2019-08-06 PROCEDURE — G8991 OTHER PT/OT GOAL STATUS: HCPCS | Performed by: PHYSICAL THERAPIST

## 2019-08-06 PROCEDURE — 97162 PT EVAL MOD COMPLEX 30 MIN: CPT | Performed by: PHYSICAL THERAPIST

## 2019-08-06 NOTE — PROGRESS NOTES
PT Evaluation     Today's date: 2019  Patient name: Elsa Abraham  : 1955  MRN: [id number]  Referring provider: Nabil Torres MD  Dx:   Encounter Diagnosis     ICD-10-CM    1  Right shoulder pain, unspecified chronicity M25 511    2  Rotator cuff impingement syndrome, right M75 41                   Assessment  Assessment details: Elsa Abraham is a 59 y o  female who presents with pain, decreased strength, decreased ROM and postural  dysfunction  Due to these impairments, Patient has difficulty performing a/iadls  Patient's clinical presentation is consistent with their referring diagnosis of right shoulder pain  Patient would benefit from skilled physical therapy to address their aforementioned impairments, improve their level of function and to improve their overall quality of life  Impairments: abnormal muscle firing, abnormal muscle tone, abnormal or restricted ROM, abnormal movement, activity intolerance, impaired physical strength, lacks appropriate home exercise program, pain with function, scapular dyskinesis, poor posture  and poor body mechanics  Understanding of Dx/Px/POC: good   Prognosis: good    Goals  ST-3 WEEKS  1  Decrease pain by 2 points on VAS at its worst   2   Increase ROM by > 5 deg in all deficients planes  3   Increase UE by 1/2 MMT grade in all deficient planes  LT-6 WEEKS  1  Patient to be independent with a/iadls reaching over head and sleeping pain free  2  Increase functional activities for leisure and home activities to previous LOF    3  Independent with HEP and/or fitness program     Plan  Patient would benefit from: skilled physical therapy  Planned modality interventions: cryotherapy, electrical stimulation/Russian stimulation, thermotherapy: hydrocollator packs and unattended electrical stimulation  Planned therapy interventions: activity modification, behavior modification, body mechanics training, aquatic therapy, flexibility, functional ROM exercises, home exercise program, IADL retraining, joint mobilization, manual therapy, neuromuscular re-education, patient education, postural training, strengthening, stretching, therapeutic activities and therapeutic exercise  Frequency: 2x week (2-3x week)  Duration in weeks: 12  Treatment plan discussed with: patient        Subjective Evaluation    History of Present Illness  Date of onset: 2017  Mechanism of injury: Patient reports neck and right shoulder pain x 18 months secondary to DJD, complains of neck and shoulder pain and weakness, history of right RTC repair 09  Quality of life: good    Pain  Current pain ratin  At best pain ratin  At worst pain ratin  Quality: dull ache  Relieving factors: ice and medications  Aggravating factors: overhead activity  Progression: no change    Hand dominance: right      Diagnostic Tests  X-ray: abnormal  MRI studies: abnormal  Treatments  Previous treatment: injection treatment, physical therapy and medication  Current treatment: chiropractic  Patient Goals  Patient goals for therapy: decreased pain, increased motion, increased strength and independence with ADLs/IADLs          Objective     Static Posture     Head  Forward  Thoracic Spine  Hyperkyphosis  Palpation     Right   Hypertonic in the upper trapezius  Tenderness of the supraspinatus and upper trapezius  Tenderness     Right Shoulder  Tenderness in the bicipital groove and supraspinatus tendon       Active Range of Motion   Cervical/Thoracic Spine       Cervical    Flexion:  WFL  Extension: 50 degrees      Left lateral flexion: 20 degrees      Right lateral flexion: 15 degrees      Left rotation: 65 degrees  Right rotation: 65 degrees         Thoracic    Extension:  Restriction level: moderate  Left Shoulder   Flexion: 160 degrees   External rotation 90°: 91 degrees   Internal rotation 90°: 60 degrees     Right Shoulder   Flexion: 135 degrees   External rotation 90°: 88 degrees  Internal rotation 90°: 45 degrees     Strength/Myotome Testing   Cervical Spine   Neck flexion: 4    Right Shoulder     Planes of Motion   Flexion: 4   Extension: 4   Abduction: 4   External rotation at 0°: 4-   Internal rotation at 0°: 4     Isolated Muscles   Supraspinatus: 3+     Tests   Cervical     Right   Positive Spurling's Test A  Right Shoulder   Positive empty can and Hawkin's                Precautions: Right RTC repair, GERD,       Manual  8/6                         MT right shouldr 10                                                       Exercise Diary  8/6            Pulleys 30x            UBE 4 min            THer bnd row 30x            Ball bayron 10x ea                                                                                                                                                                                                                                Modalities  8/6            Mh/estim 10

## 2019-08-06 NOTE — LETTER
2019    Pascual Cardenas  [address] [address]    Patient: Kori Denny   YOB: 1955   Date of Visit: 2019     Encounter Diagnosis     ICD-10-CM    1  Right shoulder pain, unspecified chronicity M25 511    2  Rotator cuff impingement syndrome, right M75 41        Dear Dr Humera Jarvis:    Thank you for your recent referral of Kori Denny  Please review the attached evaluation summary from Rosemarie's recent visit  Please verify that you agree with the plan of care by signing the attached order  If you have any questions or concerns, please do not hesitate to call  I sincerely appreciate the opportunity to share in the care of one of your patients and hope to have another opportunity to work with you in the near future  Sincerely,    Harriet Lopez, PT      Referring Provider:      I certify that I have read the below Plan of Care and certify the need for these services furnished under this plan of treatment while under my care  [address] [address] [address] [facility] [address] [facility] [address]: [phone]          PT Evaluation     Today's date: 2019  Patient name: Kori Denny  : 1955  MRN: [id number]  Referring provider: Corrinne Companion, MD  Dx:   Encounter Diagnosis     ICD-10-CM    1  Right shoulder pain, unspecified chronicity M25 511    2  Rotator cuff impingement syndrome, right M75 41                   Assessment  Assessment details: Kori Denny is a 59 y o  female who presents with pain, decreased strength, decreased ROM and postural  dysfunction  Due to these impairments, Patient has difficulty performing a/iadls  Patient's clinical presentation is consistent with their referring diagnosis of right shoulder pain   Patient would benefit from skilled physical therapy to address their aforementioned impairments, improve their level of function and to improve their overall quality of life   Impairments: abnormal muscle firing, abnormal muscle tone, abnormal or restricted ROM, abnormal movement, activity intolerance, impaired physical strength, lacks appropriate home exercise program, pain with function, scapular dyskinesis, poor posture  and poor body mechanics  Understanding of Dx/Px/POC: good   Prognosis: good    Goals  ST-3 WEEKS  1  Decrease pain by 2 points on VAS at its worst   2   Increase ROM by > 5 deg in all deficients planes  3   Increase UE by 1/2 MMT grade in all deficient planes  LT-6 WEEKS  1  Patient to be independent with a/iadls reaching over head and sleeping pain free  2  Increase functional activities for leisure and home activities to previous LOF    3  Independent with HEP and/or fitness program     Plan  Patient would benefit from: skilled physical therapy  Planned modality interventions: cryotherapy, electrical stimulation/Russian stimulation, thermotherapy: hydrocollator packs and unattended electrical stimulation  Planned therapy interventions: activity modification, behavior modification, body mechanics training, aquatic therapy, flexibility, functional ROM exercises, home exercise program, IADL retraining, joint mobilization, manual therapy, neuromuscular re-education, patient education, postural training, strengthening, stretching, therapeutic activities and therapeutic exercise  Frequency: 2x week (2-3x week)  Duration in weeks: 12  Treatment plan discussed with: patient        Subjective Evaluation    History of Present Illness  Date of onset: 2017  Mechanism of injury: Patient reports neck and right shoulder pain x 18 months secondary to DJD, complains of neck and shoulder pain and weakness, history of right RTC repair 09  Quality of life: good    Pain  Current pain ratin  At best pain ratin  At worst pain ratin  Quality: dull ache  Relieving factors: ice and medications  Aggravating factors: overhead activity  Progression: no change    Hand dominance: right      Diagnostic Tests  X-ray: abnormal  MRI studies: abnormal  Treatments  Previous treatment: injection treatment, physical therapy and medication  Current treatment: chiropractic  Patient Goals  Patient goals for therapy: decreased pain, increased motion, increased strength and independence with ADLs/IADLs          Objective     Static Posture     Head  Forward  Thoracic Spine  Hyperkyphosis  Palpation     Right   Hypertonic in the upper trapezius  Tenderness of the supraspinatus and upper trapezius  Tenderness     Right Shoulder  Tenderness in the bicipital groove and supraspinatus tendon  Active Range of Motion   Cervical/Thoracic Spine       Cervical    Flexion:  WFL  Extension: 50 degrees      Left lateral flexion: 20 degrees      Right lateral flexion: 15 degrees      Left rotation: 65 degrees  Right rotation: 65 degrees         Thoracic    Extension:  Restriction level: moderate  Left Shoulder   Flexion: 160 degrees   External rotation 90°:  91 degrees   Internal rotation 90°:  60 degrees     Right Shoulder   Flexion: 135 degrees   External rotation 90°:  88 degrees  Internal rotation 90°:  45 degrees     Strength/Myotome Testing   Cervical Spine   Neck flexion: 4    Right Shoulder     Planes of Motion   Flexion: 4   Extension: 4   Abduction: 4   External rotation at 0°:  4-   Internal rotation at 0°:  4     Isolated Muscles   Supraspinatus: 3+     Tests   Cervical     Right   Positive Spurling's Test A  Right Shoulder   Positive empty can and Hawkin's                Precautions: Right RTC repair, GERD,       Manual  8/6                         MT right shouldr 10                                                       Exercise Diary  8/6            Pulleys 30x            UBE 4 min            THer bnd row 30x            Ball bayron 10x ea Modalities  8/6            Mh/estim 10

## 2019-08-09 ENCOUNTER — OFFICE VISIT (OUTPATIENT)
Dept: PHYSICAL THERAPY | Age: 64
End: 2019-08-09
Payer: COMMERCIAL

## 2019-08-09 DIAGNOSIS — M75.41 ROTATOR CUFF IMPINGEMENT SYNDROME, RIGHT: ICD-10-CM

## 2019-08-09 DIAGNOSIS — M25.511 RIGHT SHOULDER PAIN, UNSPECIFIED CHRONICITY: Primary | ICD-10-CM

## 2019-08-09 PROCEDURE — G0283 ELEC STIM OTHER THAN WOUND: HCPCS | Performed by: PHYSICAL THERAPIST

## 2019-08-09 PROCEDURE — 97140 MANUAL THERAPY 1/> REGIONS: CPT | Performed by: PHYSICAL THERAPIST

## 2019-08-09 PROCEDURE — 97110 THERAPEUTIC EXERCISES: CPT | Performed by: PHYSICAL THERAPIST

## 2019-08-09 PROCEDURE — 97014 ELECTRIC STIMULATION THERAPY: CPT | Performed by: PHYSICAL THERAPIST

## 2019-08-09 NOTE — PROGRESS NOTES
Daily Note     Today's date: 2019  Patient name: Mateo Batista  : 1955  MRN: [id number]  Referring provider: Isabel Jarrett MD  Dx:   Encounter Diagnosis     ICD-10-CM    1  Right shoulder pain, unspecified chronicity M25 511    2  Rotator cuff impingement syndrome, right M75 41        Start Time: 0900  Stop Time: 0945  Total time in clinic (min): 45 minutes    Subjective: Patient reports some relief and she has a new tattoo on her right shoulder      Objective: See treatment diary below       Precautions: Right RTC repair, GERD,       Manual                          MT right shouldr 10 10                                                      Exercise Diary             Pulleys 30x 30x           UBE 4 min 4 min           THer bnd row 30x 30x           Ball bayron 10x ea 10x ea                                                                                                                                                                                                                               Modalities             Mh/estim 10 10                                            Assessment: Tolerated treatment well  Patient demonstrated fatigue post treatment, exhibited good technique with therapeutic exercises and would benefit from continued PT      Plan: Progress treatment as tolerated

## 2019-08-13 ENCOUNTER — OFFICE VISIT (OUTPATIENT)
Dept: PHYSICAL THERAPY | Age: 64
End: 2019-08-13
Payer: COMMERCIAL

## 2019-08-13 DIAGNOSIS — M25.511 RIGHT SHOULDER PAIN, UNSPECIFIED CHRONICITY: Primary | ICD-10-CM

## 2019-08-13 DIAGNOSIS — M75.41 ROTATOR CUFF IMPINGEMENT SYNDROME, RIGHT: ICD-10-CM

## 2019-08-13 PROCEDURE — 97140 MANUAL THERAPY 1/> REGIONS: CPT | Performed by: PHYSICAL THERAPIST

## 2019-08-13 PROCEDURE — 97110 THERAPEUTIC EXERCISES: CPT | Performed by: PHYSICAL THERAPIST

## 2019-08-13 PROCEDURE — G0283 ELEC STIM OTHER THAN WOUND: HCPCS | Performed by: PHYSICAL THERAPIST

## 2019-08-13 PROCEDURE — 97014 ELECTRIC STIMULATION THERAPY: CPT | Performed by: PHYSICAL THERAPIST

## 2019-08-13 NOTE — PROGRESS NOTES
Daily Note     Today's date: 2019  Patient name: Louie Thompson  : 1955  MRN: [id number]  Referring provider: Bhavani Machuca MD  Dx:   Encounter Diagnosis     ICD-10-CM    1  Right shoulder pain, unspecified chronicity M25 511    2  Rotator cuff impingement syndrome, right M75 41                   Subjective: Some improvement to right shoulder  Objective: See treatment diary below      Precautions: Right RTC repair, GERD,       Manual                         MT right shouldr 10 10 10                                                     Exercise Diary            Pulleys 30x 30x 30x          UBE 4 min 4 min 5 min          THer bnd row 30x 30x 30x          Ball bayron 10x ea 10x ea 10x ea  Red TB                                                                                                                                                                                                                              Modalities            Mh/estim 10 10 10                                         Assessment: Tolerated treatment well  Patient demonstrated fatigue post treatment, exhibited good technique with therapeutic exercises and would benefit from continued PT      Plan: Progress treatment as tolerated

## 2019-08-16 ENCOUNTER — OFFICE VISIT (OUTPATIENT)
Dept: PHYSICAL THERAPY | Age: 64
End: 2019-08-16
Payer: COMMERCIAL

## 2019-08-16 DIAGNOSIS — M75.41 ROTATOR CUFF IMPINGEMENT SYNDROME, RIGHT: ICD-10-CM

## 2019-08-16 DIAGNOSIS — M25.511 RIGHT SHOULDER PAIN, UNSPECIFIED CHRONICITY: Primary | ICD-10-CM

## 2019-08-16 PROCEDURE — 97014 ELECTRIC STIMULATION THERAPY: CPT | Performed by: PHYSICAL THERAPIST

## 2019-08-16 PROCEDURE — 97110 THERAPEUTIC EXERCISES: CPT | Performed by: PHYSICAL THERAPIST

## 2019-08-16 PROCEDURE — 97140 MANUAL THERAPY 1/> REGIONS: CPT | Performed by: PHYSICAL THERAPIST

## 2019-08-16 PROCEDURE — G0283 ELEC STIM OTHER THAN WOUND: HCPCS | Performed by: PHYSICAL THERAPIST

## 2019-08-16 NOTE — PROGRESS NOTES
Daily Note     Today's date: 2019  Patient name: Mari Perry  : 1955  MRN: [id number]  Referring provider: Talia Garrett MD  Dx:   Encounter Diagnosis     ICD-10-CM    1  Right shoulder pain, unspecified chronicity M25 511    2  Rotator cuff impingement syndrome, right M75 41                   Subjective: Patient reports neck and right shoulder are still sore  Objective: See treatment diary below      Precautions: Right RTC repair, GERD,       Manual                        MT right shouldr 10 10 10 10                                                    Exercise Diary           Pulleys 30x 30x 30x 30x         UBE 4 min 4 min 5 min 5 min         THer bnd row 30x 30x 30x 30x         Ball bayron 10x ea 10x ea 10x ea  Red TB 10x ea  Red  TB                                                                                                                                                                                                                             Modalities           Mh/estim 10 10 10 10                                        Assessment: Tolerated treatment well  Patient demonstrated fatigue post treatment, exhibited good technique with therapeutic exercises and would benefit from continued PT, complained of right shoulder pain with posterior glides today  Plan: Progress treatment as tolerated

## 2019-08-20 ENCOUNTER — OFFICE VISIT (OUTPATIENT)
Dept: PHYSICAL THERAPY | Age: 64
End: 2019-08-20
Payer: COMMERCIAL

## 2019-08-20 DIAGNOSIS — M25.511 RIGHT SHOULDER PAIN, UNSPECIFIED CHRONICITY: Primary | ICD-10-CM

## 2019-08-20 DIAGNOSIS — M75.41 ROTATOR CUFF IMPINGEMENT SYNDROME, RIGHT: ICD-10-CM

## 2019-08-20 PROCEDURE — 97014 ELECTRIC STIMULATION THERAPY: CPT | Performed by: PHYSICAL THERAPIST

## 2019-08-20 PROCEDURE — G0283 ELEC STIM OTHER THAN WOUND: HCPCS | Performed by: PHYSICAL THERAPIST

## 2019-08-20 PROCEDURE — 97110 THERAPEUTIC EXERCISES: CPT | Performed by: PHYSICAL THERAPIST

## 2019-08-20 NOTE — PROGRESS NOTES
Daily Note     Today's date: 2019  Patient name: Leatha Vidal  : 1955  MRN: [id number]  Referring provider: Sharon Peck MD  Dx:   Encounter Diagnosis     ICD-10-CM    1  Right shoulder pain, unspecified chronicity M25 511    2  Rotator cuff impingement syndrome, right M75 41        Start Time: 0830  Stop Time: 0915  Total time in clinic (min): 45 minutes    Subjective: still with right shoulder pain      Objective: See treatment diary below    Precautions: Right RTC repair, GERD,       Manual                       MT right shouldr 10 10 10 10 10                                                   Exercise Diary          Pulleys 30x 30x 30x 30x 30x        UBE 4 min 4 min 5 min 5 min 5 min        THer bnd row 30x 30x 30x 30x 30x        Ball bayron 10x ea 10x ea 10x ea  Red TB 10x ea  Red  TB 10x ea  Red  TB        BICEP MRE     20x                                                                                                                                                                                                               Modalities          Mh/estim 10 10 10 10 10                                      Assessment: Tolerated treatment well  Patient demonstrated fatigue post treatment, exhibited good technique with therapeutic exercises and would benefit from continued PT, still with tenderness to supraspinatus insertion and limited with internal rotation ROM and external rotation strength      Plan: Progress treatment as tolerated

## 2019-08-23 ENCOUNTER — OFFICE VISIT (OUTPATIENT)
Dept: PHYSICAL THERAPY | Age: 64
End: 2019-08-23
Payer: COMMERCIAL

## 2019-08-23 DIAGNOSIS — M75.41 ROTATOR CUFF IMPINGEMENT SYNDROME, RIGHT: ICD-10-CM

## 2019-08-23 DIAGNOSIS — M25.511 RIGHT SHOULDER PAIN, UNSPECIFIED CHRONICITY: Primary | ICD-10-CM

## 2019-08-23 PROCEDURE — 97110 THERAPEUTIC EXERCISES: CPT | Performed by: PHYSICAL THERAPIST

## 2019-08-23 PROCEDURE — 97014 ELECTRIC STIMULATION THERAPY: CPT | Performed by: PHYSICAL THERAPIST

## 2019-08-23 PROCEDURE — G0283 ELEC STIM OTHER THAN WOUND: HCPCS | Performed by: PHYSICAL THERAPIST

## 2019-08-23 NOTE — PROGRESS NOTES
DISCHARGE    Today's date: 2019  Patient name: Lester Amaya  : 1955  MRN: [id number]  Referring provider: Blaze Parsons MD  Dx:   Encounter Diagnosis     ICD-10-CM    1  Right shoulder pain, unspecified chronicity M25 511    2  Rotator cuff impingement syndrome, right M75 41                   Subjective: Patient reports MD to D/C to HEP and order home cervical traction unit      Objective: See treatment diary below      Precautions: Right RTC repair, GERD,       Manual                      MT right shouldr 10 10 10 10 10 10                                                  Exercise Diary         Pulleys 30x 30x 30x 30x 30x 30x       UBE 4 min 4 min 5 min 5 min 5 min 5 min       THer bnd row 30x 30x 30x 30x 30x 30x       Ball bayron 10x ea 10x ea 10x ea  Red TB 10x ea  Red  TB 10x ea  Red  TB 15 x       BICEP MRE     20x 30x                                                                                                                                                                                                              Modalities         Mh/estim 10 10 10 10 10 10                                     Assessment: Tolerated treatment well  Patient exhibited good technique with therapeutic exercises  At this time, patient has achieved their maximum functional benefit from skilled physical therapy services and will be discharged to their HEP  Patient is in agreement with the plan of care  As a result, patient is discharged from physical therapy        Plan: D/C to [facility]

## 2020-01-30 DIAGNOSIS — N95.2 ATROPHIC VAGINITIS: ICD-10-CM

## 2020-01-30 RX ORDER — ESTRADIOL 0.1 MG/G
CREAM VAGINAL
Qty: 42.5 G | Refills: 0 | Status: SHIPPED | OUTPATIENT
Start: 2020-01-30 | End: 2021-01-21 | Stop reason: SDUPTHER

## 2020-04-15 ENCOUNTER — APPOINTMENT (EMERGENCY)
Dept: RADIOLOGY | Facility: HOSPITAL | Age: 65
End: 2020-04-15
Attending: INTERNAL MEDICINE
Payer: COMMERCIAL

## 2020-04-15 ENCOUNTER — HOSPITAL ENCOUNTER (EMERGENCY)
Facility: HOSPITAL | Age: 65
Discharge: HOME | End: 2020-04-15
Attending: INTERNAL MEDICINE | Admitting: INTERNAL MEDICINE
Payer: COMMERCIAL

## 2020-04-15 VITALS
BODY MASS INDEX: 37.89 KG/M2 | HEIGHT: 68 IN | TEMPERATURE: 97.4 F | HEART RATE: 72 BPM | SYSTOLIC BLOOD PRESSURE: 145 MMHG | WEIGHT: 250 LBS | OXYGEN SATURATION: 96 % | DIASTOLIC BLOOD PRESSURE: 77 MMHG | RESPIRATION RATE: 16 BRPM

## 2020-04-15 DIAGNOSIS — J04.10 TRACHEITIS: ICD-10-CM

## 2020-04-15 DIAGNOSIS — J06.9 VIRAL URI WITH COUGH: Primary | ICD-10-CM

## 2020-04-15 LAB
ALBUMIN SERPL BCP-MCNC: 4.6 G/DL (ref 3.5–5.7)
ALP SERPL-CCNC: 58 U/L (ref 55–165)
ALT SERPL W P-5'-P-CCNC: 18 U/L (ref 7–52)
ANION GAP SERPL CALCULATED.3IONS-SCNC: 9 MMOL/L (ref 4–13)
AST SERPL W P-5'-P-CCNC: 20 U/L (ref 13–39)
BASOPHILS # BLD AUTO: 0.1 THOUSANDS/ΜL (ref 0–0.1)
BASOPHILS NFR BLD AUTO: 1 % (ref 0–2)
BILIRUB SERPL-MCNC: 0.4 MG/DL (ref 0.2–1)
BUN SERPL-MCNC: 13 MG/DL (ref 7–25)
CALCIUM SERPL-MCNC: 9.7 MG/DL (ref 8.6–10.5)
CHLORIDE SERPL-SCNC: 105 MMOL/L (ref 98–107)
CO2 SERPL-SCNC: 29 MMOL/L (ref 21–31)
CREAT SERPL-MCNC: 0.71 MG/DL (ref 0.6–1.2)
CRP SERPL HS-MCNC: 6.63 MG/L
EOSINOPHIL # BLD AUTO: 0.2 THOUSAND/ΜL (ref 0–0.61)
EOSINOPHIL NFR BLD AUTO: 2 % (ref 0–5)
ERYTHROCYTE [DISTWIDTH] IN BLOOD BY AUTOMATED COUNT: 14.3 % (ref 11.5–14.5)
FLUAV RNA NPH QL NAA+PROBE: NORMAL
FLUBV RNA NPH QL NAA+PROBE: NORMAL
GFR SERPL CREATININE-BSD FRML MDRD: 90 ML/MIN/1.73SQ M
GLUCOSE SERPL-MCNC: 120 MG/DL (ref 65–99)
HCT VFR BLD AUTO: 41 % (ref 42–47)
HGB BLD-MCNC: 13.1 G/DL (ref 12–16)
LACTATE SERPL-SCNC: 1.7 MMOL/L (ref 0.5–2)
LYMPHOCYTES # BLD AUTO: 2.4 THOUSANDS/ΜL (ref 0.6–4.47)
LYMPHOCYTES NFR BLD AUTO: 25 % (ref 21–51)
MCH RBC QN AUTO: 28.1 PG (ref 26–34)
MCHC RBC AUTO-ENTMCNC: 31.9 G/DL (ref 31–37)
MCV RBC AUTO: 88 FL (ref 81–99)
MONOCYTES # BLD AUTO: 0.7 THOUSAND/ΜL (ref 0.17–1.22)
MONOCYTES NFR BLD AUTO: 7 % (ref 2–12)
NEUTROPHILS # BLD AUTO: 6.1 THOUSANDS/ΜL (ref 1.4–6.5)
NEUTS SEG NFR BLD AUTO: 64 % (ref 42–75)
PLATELET # BLD AUTO: 307 THOUSANDS/UL (ref 149–390)
PMV BLD AUTO: 8.4 FL (ref 8.6–11.7)
POTASSIUM SERPL-SCNC: 4.6 MMOL/L (ref 3.5–5.5)
PROT SERPL-MCNC: 7.9 G/DL (ref 6.4–8.9)
RBC # BLD AUTO: 4.66 MILLION/UL (ref 3.9–5.2)
RSV RNA NPH QL NAA+PROBE: NORMAL
SODIUM SERPL-SCNC: 143 MMOL/L (ref 134–143)
WBC # BLD AUTO: 9.4 THOUSAND/UL (ref 4.8–10.8)

## 2020-04-15 PROCEDURE — 85025 COMPLETE CBC W/AUTO DIFF WBC: CPT | Performed by: INTERNAL MEDICINE

## 2020-04-15 PROCEDURE — 99283 EMERGENCY DEPT VISIT LOW MDM: CPT

## 2020-04-15 PROCEDURE — 87631 RESP VIRUS 3-5 TARGETS: CPT | Performed by: INTERNAL MEDICINE

## 2020-04-15 PROCEDURE — 87635 SARS-COV-2 COVID-19 AMP PRB: CPT | Performed by: INTERNAL MEDICINE

## 2020-04-15 PROCEDURE — 99284 EMERGENCY DEPT VISIT MOD MDM: CPT | Performed by: INTERNAL MEDICINE

## 2020-04-15 PROCEDURE — 87040 BLOOD CULTURE FOR BACTERIA: CPT | Performed by: INTERNAL MEDICINE

## 2020-04-15 PROCEDURE — 80053 COMPREHEN METABOLIC PANEL: CPT | Performed by: INTERNAL MEDICINE

## 2020-04-15 PROCEDURE — 86141 C-REACTIVE PROTEIN HS: CPT | Performed by: INTERNAL MEDICINE

## 2020-04-15 PROCEDURE — 71045 X-RAY EXAM CHEST 1 VIEW: CPT

## 2020-04-15 PROCEDURE — 36415 COLL VENOUS BLD VENIPUNCTURE: CPT | Performed by: INTERNAL MEDICINE

## 2020-04-15 PROCEDURE — 83605 ASSAY OF LACTIC ACID: CPT | Performed by: INTERNAL MEDICINE

## 2020-04-15 RX ORDER — DEXTROMETHORPHAN HYDROBROMIDE AND PROMETHAZINE HYDROCHLORIDE 15; 6.25 MG/5ML; MG/5ML
5 SOLUTION ORAL 4 TIMES DAILY PRN
Qty: 118 ML | Refills: 0 | Status: SHIPPED | OUTPATIENT
Start: 2020-04-15 | End: 2021-05-20 | Stop reason: ALTCHOICE

## 2020-04-15 RX ORDER — DOXYCYCLINE HYCLATE 100 MG/1
100 CAPSULE ORAL 2 TIMES DAILY PRN
Qty: 20 CAPSULE | Refills: 0 | Status: SHIPPED | OUTPATIENT
Start: 2020-04-15 | End: 2020-04-25

## 2020-04-15 RX ORDER — ALBUTEROL SULFATE 90 UG/1
2 AEROSOL, METERED RESPIRATORY (INHALATION) ONCE
Status: COMPLETED | OUTPATIENT
Start: 2020-04-15 | End: 2020-04-15

## 2020-04-15 RX ORDER — SODIUM CHLORIDE 9 MG/ML
125 INJECTION, SOLUTION INTRAVENOUS CONTINUOUS
Status: DISCONTINUED | OUTPATIENT
Start: 2020-04-15 | End: 2020-04-15 | Stop reason: HOSPADM

## 2020-04-15 RX ORDER — BENZONATATE 100 MG/1
100 CAPSULE ORAL EVERY 8 HOURS
Qty: 21 CAPSULE | Refills: 0 | Status: SHIPPED | OUTPATIENT
Start: 2020-04-15

## 2020-04-15 RX ORDER — BENZONATATE 100 MG/1
100 CAPSULE ORAL ONCE
Status: COMPLETED | OUTPATIENT
Start: 2020-04-15 | End: 2020-04-15

## 2020-04-15 RX ORDER — ALBUTEROL SULFATE 90 UG/1
2 AEROSOL, METERED RESPIRATORY (INHALATION) EVERY 4 HOURS PRN
Qty: 1 INHALER | Refills: 0 | Status: SHIPPED | OUTPATIENT
Start: 2020-04-15

## 2020-04-15 RX ADMIN — ALBUTEROL SULFATE 2 PUFF: 90 AEROSOL, METERED RESPIRATORY (INHALATION) at 15:06

## 2020-04-15 RX ADMIN — BENZONATATE 100 MG: 100 CAPSULE ORAL at 15:10

## 2020-04-16 LAB — SARS-COV-2 RNA SPEC QL NAA+PROBE: NOT DETECTED

## 2020-04-20 LAB
BACTERIA BLD CULT: NORMAL
BACTERIA BLD CULT: NORMAL

## 2020-04-22 ENCOUNTER — HOSPITAL ENCOUNTER (EMERGENCY)
Facility: HOSPITAL | Age: 65
Discharge: HOME | End: 2020-04-22
Attending: INTERNAL MEDICINE | Admitting: INTERNAL MEDICINE
Payer: COMMERCIAL

## 2020-04-22 VITALS
DIASTOLIC BLOOD PRESSURE: 63 MMHG | BODY MASS INDEX: 37.89 KG/M2 | HEIGHT: 68 IN | HEART RATE: 69 BPM | SYSTOLIC BLOOD PRESSURE: 133 MMHG | TEMPERATURE: 97.8 F | WEIGHT: 250 LBS | RESPIRATION RATE: 18 BRPM | OXYGEN SATURATION: 96 %

## 2020-04-22 DIAGNOSIS — L50.9 URTICARIA: Primary | ICD-10-CM

## 2020-04-22 DIAGNOSIS — L29.9 PRURITUS: ICD-10-CM

## 2020-04-22 DIAGNOSIS — T78.40XA ALLERGIC REACTION, INITIAL ENCOUNTER: ICD-10-CM

## 2020-04-22 PROCEDURE — 96375 TX/PRO/DX INJ NEW DRUG ADDON: CPT

## 2020-04-22 PROCEDURE — 96374 THER/PROPH/DIAG INJ IV PUSH: CPT

## 2020-04-22 PROCEDURE — 99284 EMERGENCY DEPT VISIT MOD MDM: CPT | Performed by: INTERNAL MEDICINE

## 2020-04-22 PROCEDURE — 99282 EMERGENCY DEPT VISIT SF MDM: CPT

## 2020-04-22 RX ORDER — METHYLPREDNISOLONE SODIUM SUCCINATE 125 MG/2ML
125 INJECTION, POWDER, LYOPHILIZED, FOR SOLUTION INTRAMUSCULAR; INTRAVENOUS ONCE
Status: COMPLETED | OUTPATIENT
Start: 2020-04-22 | End: 2020-04-22

## 2020-04-22 RX ORDER — DIPHENHYDRAMINE HCL 25 MG
50 TABLET ORAL ONCE
Status: COMPLETED | OUTPATIENT
Start: 2020-04-22 | End: 2020-04-22

## 2020-04-22 RX ADMIN — FAMOTIDINE 20 MG: 10 INJECTION, SOLUTION INTRAVENOUS at 21:15

## 2020-04-22 RX ADMIN — DIPHENHYDRAMINE HCL 50 MG: 25 TABLET ORAL at 22:13

## 2020-04-22 RX ADMIN — METHYLPREDNISOLONE SODIUM SUCCINATE 125 MG: 125 INJECTION, POWDER, FOR SOLUTION INTRAMUSCULAR; INTRAVENOUS at 21:11

## 2021-01-21 ENCOUNTER — OFFICE VISIT (OUTPATIENT)
Dept: OBGYN CLINIC | Facility: MEDICAL CENTER | Age: 66
End: 2021-01-21
Payer: COMMERCIAL

## 2021-01-21 VITALS — WEIGHT: 253.6 LBS | DIASTOLIC BLOOD PRESSURE: 74 MMHG | SYSTOLIC BLOOD PRESSURE: 128 MMHG | BODY MASS INDEX: 38.56 KG/M2

## 2021-01-21 DIAGNOSIS — R30.0 DYSURIA: ICD-10-CM

## 2021-01-21 DIAGNOSIS — N95.2 ATROPHIC VAGINITIS: Primary | ICD-10-CM

## 2021-01-21 PROCEDURE — 99213 OFFICE O/P EST LOW 20 MIN: CPT | Performed by: STUDENT IN AN ORGANIZED HEALTH CARE EDUCATION/TRAINING PROGRAM

## 2021-01-21 PROCEDURE — 87086 URINE CULTURE/COLONY COUNT: CPT | Performed by: STUDENT IN AN ORGANIZED HEALTH CARE EDUCATION/TRAINING PROGRAM

## 2021-01-21 RX ORDER — ESTRADIOL 0.1 MG/G
CREAM VAGINAL
Qty: 42.5 G | Refills: 0 | Status: SHIPPED | OUTPATIENT
Start: 2021-01-21 | End: 2021-08-16

## 2021-01-21 NOTE — PROGRESS NOTES
Assessment/Plan:     71 yo F with vaginal burning  Wet prep normal   Recommend switching back to dove, start using estrace again  Referral for sling removal   Problem List Items Addressed This Visit    Visit Diagnoses     Atrophic vaginitis    -  Primary    Relevant Medications    estradiol (ESTRACE) 0 1 mg/g vaginal cream    Dysuria        Relevant Orders    Urine culture            Subjective:     Patient ID: Christel Mustafa is a 72 y o  female  71 yo F s/p hysterectomy with chronic vaginal burning  No bleeding, discharge, itching or odor  She had a sling placed for stress incontinence about 10 yrs ago  Reports no improvement in stress incontinence and has had burning since then  Ran out of estrace about a month ago but doesn't believe that's really helping either  Burning increases with urination  Would like mesh removed  Does report she used [place] recently and does feel a bit more irritated with that than when she uses dove  Review of Systems   All other systems reviewed and are negative  Objective:     Physical Exam  Exam conducted with a chaperone present  Pulmonary:      Effort: Pulmonary effort is normal    Genitourinary:     Comments: Mild erythema on b/l labia  Thin white discharge in vagina  PH 5  No clue cells or yeast  Sling palpable on internal exam  Does not appear to be eroding through vaginal wall  Neurological:      Mental Status: She is alert and oriented to person, place, and time     Psychiatric:         Behavior: Behavior normal

## 2021-01-23 LAB — BACTERIA UR CULT: NORMAL

## 2021-02-03 ENCOUNTER — TELEPHONE (OUTPATIENT)
Dept: OBGYN CLINIC | Facility: CLINIC | Age: 66
End: 2021-02-03

## 2021-02-03 NOTE — TELEPHONE ENCOUNTER
Patient was given information to contact Dr Vannesa Pringle at [phone] or Dr Daniele Bartlett at [phone] to see if either do a mesh sling removal  Patient is aware urine culture was negative

## 2021-03-10 DIAGNOSIS — Z23 ENCOUNTER FOR IMMUNIZATION: ICD-10-CM

## 2021-03-11 ENCOUNTER — TRANSCRIBE ORDERS (OUTPATIENT)
Dept: ADMINISTRATIVE | Facility: HOSPITAL | Age: 66
End: 2021-03-11

## 2021-03-11 DIAGNOSIS — R51.9 HEADACHE: Primary | ICD-10-CM

## 2021-03-18 ENCOUNTER — HOSPITAL ENCOUNTER (OUTPATIENT)
Dept: CT IMAGING | Facility: HOSPITAL | Age: 66
Discharge: HOME | End: 2021-03-18
Payer: COMMERCIAL

## 2021-03-18 DIAGNOSIS — R51.9 HEADACHE: ICD-10-CM

## 2021-03-18 PROCEDURE — G1004 CDSM NDSC: HCPCS

## 2021-03-18 PROCEDURE — 70450 CT HEAD/BRAIN W/O DYE: CPT

## 2021-05-20 ENCOUNTER — TELEPHONE (OUTPATIENT)
Dept: PHYSICAL THERAPY | Facility: OTHER | Age: 66
End: 2021-05-20

## 2021-05-20 ENCOUNTER — HOSPITAL ENCOUNTER (EMERGENCY)
Facility: HOSPITAL | Age: 66
Discharge: HOME | End: 2021-05-20
Attending: FAMILY MEDICINE | Admitting: FAMILY MEDICINE
Payer: COMMERCIAL

## 2021-05-20 VITALS
RESPIRATION RATE: 18 BRPM | HEART RATE: 77 BPM | SYSTOLIC BLOOD PRESSURE: 162 MMHG | WEIGHT: 240 LBS | BODY MASS INDEX: 36.49 KG/M2 | TEMPERATURE: 97.6 F | OXYGEN SATURATION: 94 % | DIASTOLIC BLOOD PRESSURE: 73 MMHG

## 2021-05-20 DIAGNOSIS — S16.1XXA ACUTE STRAIN OF NECK MUSCLE, INITIAL ENCOUNTER: Primary | ICD-10-CM

## 2021-05-20 PROCEDURE — 99284 EMERGENCY DEPT VISIT MOD MDM: CPT | Performed by: PHYSICIAN ASSISTANT

## 2021-05-20 PROCEDURE — 99283 EMERGENCY DEPT VISIT LOW MDM: CPT

## 2021-05-20 PROCEDURE — 96372 THER/PROPH/DIAG INJ SC/IM: CPT

## 2021-05-20 RX ORDER — LIDOCAINE 50 MG/G
1 PATCH TOPICAL ONCE
Status: DISCONTINUED | OUTPATIENT
Start: 2021-05-20 | End: 2021-05-20 | Stop reason: HOSPADM

## 2021-05-20 RX ORDER — NAPROXEN 375 MG/1
375 TABLET ORAL 2 TIMES DAILY WITH MEALS
Qty: 20 TABLET | Refills: 0 | Status: SHIPPED | OUTPATIENT
Start: 2021-05-20

## 2021-05-20 RX ORDER — METHOCARBAMOL 500 MG/1
500 TABLET, FILM COATED ORAL 2 TIMES DAILY
Qty: 20 TABLET | Refills: 0 | Status: SHIPPED | OUTPATIENT
Start: 2021-05-20

## 2021-05-20 RX ORDER — ACETAMINOPHEN 325 MG/1
975 TABLET ORAL ONCE
Status: COMPLETED | OUTPATIENT
Start: 2021-05-20 | End: 2021-05-20

## 2021-05-20 RX ORDER — KETOROLAC TROMETHAMINE 30 MG/ML
15 INJECTION, SOLUTION INTRAMUSCULAR; INTRAVENOUS ONCE
Status: COMPLETED | OUTPATIENT
Start: 2021-05-20 | End: 2021-05-20

## 2021-05-20 RX ORDER — CAPSAICIN 0.025 %
1 CREAM (GRAM) TOPICAL 2 TIMES DAILY
Qty: 60 G | Refills: 0 | Status: SHIPPED | OUTPATIENT
Start: 2021-05-20

## 2021-05-20 RX ADMIN — LIDOCAINE 1 PATCH: 50 PATCH TOPICAL at 11:17

## 2021-05-20 RX ADMIN — ACETAMINOPHEN 975 MG: 325 TABLET ORAL at 11:16

## 2021-05-20 RX ADMIN — KETOROLAC TROMETHAMINE 15 MG: 30 INJECTION, SOLUTION INTRAMUSCULAR; INTRAVENOUS at 11:16

## 2021-05-20 NOTE — DISCHARGE INSTRUCTIONS
Apply capsaicin topically and wash your hands immediately after using without touching your eyes or genitals  Use a rubber glove if it is easier for you  You may also apply moist heat to your neck  Take Robaxin as needed for muscle spasms however it may make you drowsy so do not operate machinery, drive or take alcohol on this medicine  Take naproxen twice daily with food while your pain last   Follow-up with [facility]  Return to ER if symptoms significantly worsen

## 2021-05-20 NOTE — ED PROVIDER NOTES
History  Chief Complaint   Patient presents with    Neck Pain     59-year-old female history of DJD and prior cervical spine herniated discs presents complaining of neck pain  Patient reports that she turned neck suddenly when her 's alarm clock went off early this morning  She reports a sharp pain in the lateral musculature of her neck this morning  She took 1 tramadol with no relief of her symptoms  She denies any paresthesias or weakness in either upper extremity  She reports taking muscle relaxers in the past with adequate relief of her symptoms  Denies any other complaints at this time  Has not had any recent trauma to her head, neck or back  Denies any headache, changes to vision, nausea, vomiting, chest pain  Prior to Admission Medications   Prescriptions Last Dose Informant Patient Reported? Taking? albuterol (PROVENTIL HFA,VENTOLIN HFA) 90 mcg/act inhaler  Self No No   Sig: Inhale 2 puffs every 4 (four) hours as needed for wheezing   Patient not taking: Reported on 1/21/2021   benzonatate (TESSALON PERLES) 100 mg capsule  Self No No   Sig: Take 1 capsule (100 mg total) by mouth every 8 (eight) hours   Patient not taking: Reported on 1/21/2021   estradiol (ESTRACE) 0 1 mg/g vaginal cream   No No   Sig: Use nightly for 1 week  Then use 2 - 3 times per week  fenofibrate (TRICOR) 145 mg tablet  Self Yes No   Sig: Take 145 mg by mouth daily   meclizine (ANTIVERT) 25 mg tablet   No No   Sig: Take 1 tablet (25 mg total) by mouth 3 (three) times a day as needed for dizziness for up to 21 days   omeprazole (PriLOSEC) 20 mg delayed release capsule  Self Yes No   Sig: Take 20 mg by mouth daily   traMADol (ULTRAM) 50 mg tablet 5/19/2021 at 1830 Self No Yes   Sig: Take 1-2 tabs p  O  Daily Q 6 hours p r n   Pain    zolpidem (AMBIEN) 10 mg tablet  Self Yes No   Sig: Take 1 tablet by mouth daily at bedtime      Facility-Administered Medications: None       Past Medical History:   Diagnosis Date  CPAP (continuous positive airway pressure) dependence     does not use machine    DJD (degenerative joint disease)     Gait abnormality     GERD (gastroesophageal reflux disease)     History of transfusion     Hyperlipidemia     Obesity (BMI 35 0-39 9 without comorbidity)     Sleep apnea        Past Surgical History:   Procedure Laterality Date    BLADDER SUSPENSION      CHOLECYSTECTOMY      COLONOSCOPY      CYSTOSCOPY N/A 1/23/2018    Procedure: CYSTOSCOPY;  Surgeon: Camilo Briseno MD;  [facility]: [facility];  Service: Gynecology Oncology    JOINT REPLACEMENT Bilateral     MO HYSTEROSCOPY,W/ENDO BX N/A 12/1/2017    Procedure: DILATATION AND CURETTAGE (D&C) WITH HYSTEROSCOPY; POLYPECTOMY;  Surgeon: Tim Bennett MD;  [facility];  Service: Gynecology    MO LAPAROSCOPY W TOT HYSTERECTUTERUS <=[address] [place]  W TUBE/OVARY N/A 1/23/2018    Procedure: ROBOTIC ASSISTED TOTAL LAPAROSCOPIC HYSTERECTOMY; BILATERAL SALPINGOOPHERECTOMY;  Surgeon: Camilo Briseno MD;  [facility];  Service: Gynecology Oncology    REPLACEMENT TOTAL KNEE BILATERAL      ROTATOR CUFF REPAIR Left     TUBAL LIGATION         Family History   Problem Relation Age of Onset    Breast cancer Maternal Grandmother      I have reviewed and agree with the history as documented  E-Cigarette/Vaping    E-Cigarette Use Never User      E-Cigarette/Vaping Substances    Nicotine No     THC No     CBD No     Flavoring No     Other No     Unknown No      Social History     Tobacco Use    Smoking status: Never Smoker    Smokeless tobacco: Never Used   Substance Use Topics    Alcohol use: No    Drug use: No       Review of Systems   Constitutional: Negative for chills, fatigue and fever  HENT: Negative for ear pain and sore throat  Eyes: Negative for pain  Respiratory: Negative for cough, shortness of breath and wheezing  Cardiovascular: Negative for chest pain, palpitations and leg swelling  Gastrointestinal: Negative for abdominal pain, constipation, diarrhea, nausea and vomiting  Endocrine: Negative for polyuria  Genitourinary: Negative for dysuria and pelvic pain  Musculoskeletal: Positive for neck pain  Negative for arthralgias, myalgias and neck stiffness  Skin: Negative for rash  Neurological: Negative for dizziness, syncope, light-headedness and headaches  All other systems reviewed and are negative  Physical Exam  Physical Exam  Constitutional:       Appearance: She is well-developed  HENT:      Head: Normocephalic and atraumatic  Mouth/Throat:      Pharynx: No oropharyngeal exudate  Neck:      Musculoskeletal: Normal range of motion  Cardiovascular:      Rate and Rhythm: Normal rate and regular rhythm  Heart sounds: Normal heart sounds  Pulmonary:      Effort: Pulmonary effort is normal       Breath sounds: Normal breath sounds  Abdominal:      General: Bowel sounds are normal       Palpations: Abdomen is soft  Tenderness: There is no abdominal tenderness  Musculoskeletal: Normal range of motion  Comments: Full C-spine range of motion although tight and painful  Tenderness over muscle belly of right levator scapula and right trapezius muscle  Bite of 5 strength in bilateral upper extremities  Sensory motor function intact  Brisk cap refill bilaterally  Skin:     General: Skin is warm  Capillary Refill: Capillary refill takes less than 2 seconds  Neurological:      General: No focal deficit present  Mental Status: She is alert and oriented to person, place, and time           Vital Signs  ED Triage Vitals [05/20/21 1037]   Temperature Pulse Respirations Blood Pressure SpO2   97 6 °F (36 4 °C) 77 18 162/73 94 %      Temp Source Heart Rate Source Patient Position - Orthostatic VS BP Location FiO2 (%)   Tympanic Monitor Sitting Left arm --      Pain Score       8           Vitals:    05/20/21 1037   BP: 162/73   Pulse: 77 Patient Position - Orthostatic VS: Sitting         Visual Acuity      ED Medications  Medications   lidocaine (LIDODERM) 5 % patch 1 patch (1 patch Topical Medication Applied 5/20/21 1117)   ketorolac (TORADOL) injection 15 mg (15 mg Intramuscular Given 5/20/21 1116)   acetaminophen (TYLENOL) tablet 975 mg (975 mg Oral Given 5/20/21 1116)       Diagnostic Studies  Results Reviewed     None                 No orders to display              Procedures  Procedures         ED Course                                           MDM  Number of Diagnoses or Management Options  Acute strain of neck muscle, initial encounter:   Diagnosis management comments: Patient presented with acute on chronic neck pain  Tender over cervical musculature without midline tenderness  No recent trauma  Offered imaging but patient declined after my explanation that unlikely to have significant findings that would   Pain improved with Tylenol and Toradol in the ED  Patient drove here so will not give muscle relaxers until patient is home  Referral to [facility] given  I estimate a low likelihood a vertebral artery dissection, severe stenosis or cord compression  Return precautions advised  Patient agreeable to plan  Disposition  Final diagnoses:   Acute strain of neck muscle, initial encounter     Time reflects when diagnosis was documented in both MDM as applicable and the Disposition within this note     Time User Action Codes Description Comment    5/20/2021 11:25 AM Ricky Adam Add [S16  1XXA] Acute strain of neck muscle, initial encounter       ED Disposition     ED Disposition Condition Date/Time Comment    Discharge Stable Thu May 20, 2021 11:25 AM [address] discharge to home/self care              Follow-up Information    None         Patient's Medications   Discharge Prescriptions    CAPSAICIN (ZOSTRIX) 0 025 % CREAM    Apply 1 application topically 2 (two) times a day       Start Date: 5/20/2021 End Date: --       Order Dose: 1 application       Quantity: 60 g    Refills: 0    METHOCARBAMOL (ROBAXIN) 500 MG TABLET    Take 1 tablet (500 mg total) by mouth 2 (two) times a day       Start Date: 5/20/2021 End Date: --       Order Dose: 500 mg       Quantity: 20 tablet    Refills: 0    NAPROXEN (NAPROSYN) 375 MG TABLET    Take 1 tablet (375 mg total) by mouth 2 (two) times a day with meals       Start Date: 5/20/2021 End Date: --       Order Dose: 375 mg       Quantity: 20 tablet    Refills: 0         PDMP Review     None          ED Provider  Electronically Signed by           Chanell Howard PA-C  05/20/21 0844

## 2021-05-24 ENCOUNTER — TELEPHONE (OUTPATIENT)
Dept: PHYSICAL THERAPY | Facility: OTHER | Age: 66
End: 2021-05-24

## 2021-05-24 NOTE — TELEPHONE ENCOUNTER
Call placed to the patient per [facility] referral     After explanation of the program the patient is refusing at this time  Patient was provided with our ph# and hours of business  Referral Closed

## 2021-08-16 DIAGNOSIS — N95.2 ATROPHIC VAGINITIS: ICD-10-CM

## 2021-08-16 RX ORDER — ESTRADIOL 0.1 MG/G
CREAM VAGINAL
Qty: 42.5 G | Refills: 0 | Status: SHIPPED | OUTPATIENT
Start: 2021-08-16 | End: 2021-09-24 | Stop reason: SDUPTHER

## 2021-09-24 ENCOUNTER — ANNUAL EXAM (OUTPATIENT)
Dept: OBGYN CLINIC | Facility: MEDICAL CENTER | Age: 66
End: 2021-09-24
Payer: COMMERCIAL

## 2021-09-24 VITALS — HEIGHT: 68 IN | BODY MASS INDEX: 36.49 KG/M2

## 2021-09-24 DIAGNOSIS — N95.2 ATROPHIC VAGINITIS: ICD-10-CM

## 2021-09-24 DIAGNOSIS — Z12.31 ENCOUNTER FOR SCREENING MAMMOGRAM FOR MALIGNANT NEOPLASM OF BREAST: ICD-10-CM

## 2021-09-24 DIAGNOSIS — Z78.0 ASYMPTOMATIC POSTMENOPAUSAL ESTROGEN DEFICIENCY: ICD-10-CM

## 2021-09-24 DIAGNOSIS — Z01.419 ENCOUNTER FOR ANNUAL ROUTINE GYNECOLOGICAL EXAMINATION: Primary | ICD-10-CM

## 2021-09-24 PROCEDURE — 99397 PER PM REEVAL EST PAT 65+ YR: CPT | Performed by: STUDENT IN AN ORGANIZED HEALTH CARE EDUCATION/TRAINING PROGRAM

## 2021-09-24 RX ORDER — ESTRADIOL 0.1 MG/G
CREAM VAGINAL
Qty: 42.5 G | Refills: 2 | Status: SHIPPED | OUTPATIENT
Start: 2021-09-24 | End: 2021-10-04 | Stop reason: SDUPTHER

## 2021-09-24 NOTE — PROGRESS NOTES
Assessment/Plan:    76 yo  - annual exam      Problem List Items Addressed This Visit    Visit Diagnoses     Encounter for annual routine gynecological examination    -  Primary  Pap no longer indicated  Strongly encouraged to complete mammo and DEXA  Dyspareunia/atrophy - recommend nightly premarin x 2 weeks then 2-3 x weekly  + lubricants during intercourse  Refills sent  Encounter for screening mammogram for malignant neoplasm of breast        Relevant Orders    Mammo screening bilateral w 3d & cad    Asymptomatic postmenopausal estrogen deficiency        Relevant Orders    DXA bone density spine hip and pelvis            Subjective:      Patient ID: Kristin Spencer is a 77 y o  female  This is a 77 y o  postmenopausal  who presents for annual exam  She is s/p TLH, BSO, cysto for EIN in 2018  She denies vaginal bleeding, discharge, or pelvic pain  She uses estrace 1-2 times per week for vaginal dryness  She does have severe pain with intercourse  She denies urinary issues including stress and urge incontinence  She has regular bowel movements  Last pap smear: 10/18/17 neg/neg   Last mammogram: 5/3/17   Colon Cancer screenin - f/u in 10 years  DEXA:  Never     No changes in family history  Watches grandchildren - ages 3 and 3  Taking sewing class this weekend! No dedicated exercise time  Tries to eat healthy diet  The following portions of the patient's history were reviewed and updated as appropriate: allergies, current medications, past family history, past medical history, past social history, past surgical history and problem list     Review of Systems   Constitutional: Negative  HENT: Negative  Eyes: Negative  Respiratory: Negative  Cardiovascular: Negative  Gastrointestinal: Negative  Endocrine: Negative  Genitourinary: Positive for dyspareunia  Negative for dysuria, frequency, menstrual problem, pelvic pain, vaginal discharge and vaginal pain  Musculoskeletal: Negative  Skin: Negative  Allergic/Immunologic: Negative  Neurological: Negative  Hematological: Negative  Psychiatric/Behavioral: Negative  Objective:      Ht 5' 8" (1 727 m)   LMP  (LMP Unknown)   Breastfeeding No   BMI 36 49 kg/m²          Physical Exam  Vitals reviewed  Exam conducted with a chaperone present  Cardiovascular:      Rate and Rhythm: Normal rate  Pulmonary:      Effort: Pulmonary effort is normal    Chest:      Breasts: Breasts are symmetrical          Right: No mass, nipple discharge, skin change or tenderness  Left: No mass, nipple discharge, skin change or tenderness  Abdominal:      Palpations: Abdomen is soft  Genitourinary:     Labia:         Right: No rash  Left: No rash  Vagina: Normal       Adnexa:         Right: No mass, tenderness or fullness  Left: No mass, tenderness or fullness  Comments: Cervix, uterus and ovaries surgically absent  Vaginal cuff is freely mobile  No lesions or abnormalities  Musculoskeletal:      Cervical back: Normal range of motion  Skin:     General: Skin is warm and dry  Neurological:      Mental Status: She is alert and oriented to person, place, and time     Psychiatric:         Behavior: Behavior normal

## 2021-10-04 DIAGNOSIS — N95.2 ATROPHIC VAGINITIS: ICD-10-CM

## 2021-10-04 RX ORDER — ESTRADIOL 0.1 MG/G
CREAM VAGINAL
Qty: 42.5 G | Refills: 2 | Status: SHIPPED | OUTPATIENT
Start: 2021-10-04 | End: 2022-06-21

## 2021-10-13 ENCOUNTER — HOSPITAL ENCOUNTER (OUTPATIENT)
Dept: BONE DENSITY | Facility: HOSPITAL | Age: 66
Discharge: HOME | End: 2021-10-13
Attending: STUDENT IN AN ORGANIZED HEALTH CARE EDUCATION/TRAINING PROGRAM
Payer: COMMERCIAL

## 2021-10-13 ENCOUNTER — HOSPITAL ENCOUNTER (OUTPATIENT)
Dept: MAMMOGRAPHY | Facility: HOSPITAL | Age: 66
Discharge: HOME | End: 2021-10-13
Attending: STUDENT IN AN ORGANIZED HEALTH CARE EDUCATION/TRAINING PROGRAM
Payer: COMMERCIAL

## 2021-10-13 VITALS — BODY MASS INDEX: 37.89 KG/M2 | HEIGHT: 68 IN | WEIGHT: 250 LBS

## 2021-10-13 DIAGNOSIS — Z12.31 ENCOUNTER FOR SCREENING MAMMOGRAM FOR MALIGNANT NEOPLASM OF BREAST: ICD-10-CM

## 2021-10-13 DIAGNOSIS — Z78.0 ASYMPTOMATIC POSTMENOPAUSAL ESTROGEN DEFICIENCY: ICD-10-CM

## 2021-10-13 PROCEDURE — 77080 DXA BONE DENSITY AXIAL: CPT

## 2021-10-13 PROCEDURE — 77067 SCR MAMMO BI INCL CAD: CPT

## 2021-10-13 PROCEDURE — 77063 BREAST TOMOSYNTHESIS BI: CPT

## 2022-03-08 ENCOUNTER — OFFICE VISIT (OUTPATIENT)
Dept: PODIATRY | Facility: CLINIC | Age: 67
End: 2022-03-08
Payer: COMMERCIAL

## 2022-03-08 VITALS
OXYGEN SATURATION: 97 % | SYSTOLIC BLOOD PRESSURE: 132 MMHG | WEIGHT: 243 LBS | HEIGHT: 68 IN | DIASTOLIC BLOOD PRESSURE: 64 MMHG | HEART RATE: 75 BPM | BODY MASS INDEX: 36.83 KG/M2

## 2022-03-08 DIAGNOSIS — B35.1 TINEA UNGUIUM: Primary | ICD-10-CM

## 2022-03-08 PROCEDURE — 99203 OFFICE O/P NEW LOW 30 MIN: CPT | Performed by: STUDENT IN AN ORGANIZED HEALTH CARE EDUCATION/TRAINING PROGRAM

## 2022-03-09 NOTE — PROGRESS NOTES
Assessment/Plan:    No problem-specific Assessment & Plan notes found for this encounter  Diagnoses and all orders for this visit:    Tinea unguium  -     ciclopirox (PENLAC) 8 % solution; Apply topically daily at bedtime      Plan:     - diagnosis and treatments were discussed with patient  I offered patient various treatment options including topical OTC and prescription anti-fungal topicals and oral anti-fungal medications  I also explained patient risks and benefits of each treatment  Patient opted for topical anti-fungal  Rx Panlac solution and went over the application protocol  Patient agrees with plan and understands  All questions and concerns were addressed  - return in 3-4 months   - Call if any questions     Subjective:      Patient ID: Amy Culver is a 77 y o  female  Debbie Gardner 77year old with PMH of hyperlipidemia, obesity, sleep apnea presents for an evaluation of thickened, discolored toenails of the left foot  Patient denies pain associated with fungal toenails  She noted discoloration since a year now  Has not tried any treatments at home or OTC topicals  Sates she is not diabetic however is due for labs later this month for an A1c  Denies any other complaints  The following portions of the patient's history were reviewed and updated as appropriate:   She  has a past medical history of CPAP (continuous positive airway pressure) dependence, DJD (degenerative joint disease), Gait abnormality, GERD (gastroesophageal reflux disease), History of transfusion, Hyperlipidemia, Obesity (BMI 35 0-39 9 without comorbidity), and Sleep apnea    She   Patient Active Problem List    Diagnosis Date Noted    Pneumonia due to infectious organism 01/21/2019    Bacterial vaginitis 10/08/2018    Abnormal CT of the abdomen 03/01/2018    Post-operative pain 02/01/2018    Postoperative state 01/31/2018    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    Thickened endometrium 12/01/2017    Postmenopausal 12/01/2017    Uterine polyp 12/01/2017     She  has a past surgical history that includes Joint replacement (Bilateral); Tubal ligation; Colonoscopy; Rotator cuff repair (Left); pr hysteroscopy,w/endo bx (N/A, 12/1/2017); pr laparoscopy w tot hysterectuterus <=250 gram  w tube/ovary (N/A, 1/23/2018); CYSTOSCOPY (N/A, 1/23/2018); Cholecystectomy; Bladder suspension; Replacement total knee bilateral; Reduction mammaplasty (Bilateral, 18yrs ago); and Hysterectomy       Review of Systems   Constitutional: Negative for chills and fever  Respiratory: Negative for shortness of breath  Cardiovascular: Negative for chest pain  Gastrointestinal: Negative for diarrhea and vomiting  Musculoskeletal: Negative for arthralgias  Skin: Positive for color change  Negative for wound  Neurological: Negative for dizziness  Psychiatric/Behavioral: Negative for agitation  Objective:      /64 (BP Location: Right arm, Patient Position: Sitting, Cuff Size: Standard)   Pulse 75   Ht 5' 8" (1 727 m) Comment: verbal  Wt 110 kg (243 lb)   LMP  (LMP Unknown)   SpO2 97%   BMI 36 95 kg/m²     Lab Results   Component Value Date    HGBA1C 6 0 (H) 01/18/2018        Physical Exam  Vitals reviewed  Constitutional:       Appearance: She is obese  HENT:      Head: Normocephalic  Cardiovascular:      Rate and Rhythm: Normal rate  Pulses: Normal pulses  Dorsalis pedis pulses are 2+ on the right side and 2+ on the left side  Posterior tibial pulses are 2+ on the right side and 2+ on the left side  Pulmonary:      Effort: Pulmonary effort is normal    Musculoskeletal:         General: Normal range of motion  Right foot: Normal range of motion  Left foot: Normal range of motion  Feet:      Right foot:      Protective Sensation: 10 sites tested  10 sites sensed  Skin integrity: Dry skin present        Toenail Condition: Right toenails are normal  Left foot:      Protective Sensation: 10 sites tested  10 sites sensed  Skin integrity: Dry skin present  Toenail Condition: Left toenails are abnormally thick  Fungal disease present  Comments: Left hallux toenail thick, dystrophic, brittle, discolored with subungual debris noted  Toenails 2-5 are discolored and thick distally  Skin:     General: Skin is warm and dry  Capillary Refill: Capillary refill takes less than 2 seconds  Neurological:      General: No focal deficit present  Mental Status: She is alert     Psychiatric:         Mood and Affect: Mood normal

## 2022-05-09 ENCOUNTER — HOSPITAL ENCOUNTER (EMERGENCY)
Facility: HOSPITAL | Age: 67
Discharge: HOME | End: 2022-05-09
Attending: EMERGENCY MEDICINE
Payer: COMMERCIAL

## 2022-05-09 VITALS
HEIGHT: 68 IN | OXYGEN SATURATION: 98 % | WEIGHT: 242.51 LBS | SYSTOLIC BLOOD PRESSURE: 138 MMHG | RESPIRATION RATE: 18 BRPM | TEMPERATURE: 97.5 F | DIASTOLIC BLOOD PRESSURE: 66 MMHG | HEART RATE: 60 BPM | BODY MASS INDEX: 36.75 KG/M2

## 2022-05-09 DIAGNOSIS — E86.0 DEHYDRATION: ICD-10-CM

## 2022-05-09 DIAGNOSIS — I10 HIGH BLOOD PRESSURE: Primary | ICD-10-CM

## 2022-05-09 LAB
ANION GAP SERPL CALCULATED.3IONS-SCNC: 9 MMOL/L (ref 4–13)
BASOPHILS # BLD AUTO: 0.07 THOUSANDS/ΜL (ref 0–0.1)
BASOPHILS NFR BLD AUTO: 1 % (ref 0–1)
BUN SERPL-MCNC: 12 MG/DL (ref 5–25)
CALCIUM SERPL-MCNC: 9.3 MG/DL (ref 8.4–10.2)
CHLORIDE SERPL-SCNC: 105 MMOL/L (ref 96–108)
CO2 SERPL-SCNC: 28 MMOL/L (ref 21–32)
CREAT SERPL-MCNC: 0.68 MG/DL (ref 0.6–1.3)
EOSINOPHIL # BLD AUTO: 0.24 THOUSAND/ΜL (ref 0–0.61)
EOSINOPHIL NFR BLD AUTO: 3 % (ref 0–6)
ERYTHROCYTE [DISTWIDTH] IN BLOOD BY AUTOMATED COUNT: 13.9 % (ref 11.6–15.1)
GFR SERPL CREATININE-BSD FRML MDRD: 90 ML/MIN/1.73SQ M
GLUCOSE SERPL-MCNC: 74 MG/DL (ref 65–140)
HCT VFR BLD AUTO: 40.3 % (ref 34.8–46.1)
HGB BLD-MCNC: 12.2 G/DL (ref 11.5–15.4)
IMM GRANULOCYTES # BLD AUTO: 0.02 THOUSAND/UL (ref 0–0.2)
IMM GRANULOCYTES NFR BLD AUTO: 0 % (ref 0–2)
LYMPHOCYTES # BLD AUTO: 2.44 THOUSANDS/ΜL (ref 0.6–4.47)
LYMPHOCYTES NFR BLD AUTO: 33 % (ref 14–44)
MCH RBC QN AUTO: 28.1 PG (ref 26.8–34.3)
MCHC RBC AUTO-ENTMCNC: 30.3 G/DL (ref 31.4–37.4)
MCV RBC AUTO: 93 FL (ref 82–98)
MONOCYTES # BLD AUTO: 0.89 THOUSAND/ΜL (ref 0.17–1.22)
MONOCYTES NFR BLD AUTO: 12 % (ref 4–12)
NEUTROPHILS # BLD AUTO: 3.67 THOUSANDS/ΜL (ref 1.85–7.62)
NEUTS SEG NFR BLD AUTO: 51 % (ref 43–75)
NRBC BLD AUTO-RTO: 0 /100 WBCS
PLATELET # BLD AUTO: 304 THOUSANDS/UL (ref 149–390)
PMV BLD AUTO: 10.2 FL (ref 8.9–12.7)
POTASSIUM SERPL-SCNC: 3.8 MMOL/L (ref 3.5–5.3)
RBC # BLD AUTO: 4.34 MILLION/UL (ref 3.81–5.12)
SODIUM SERPL-SCNC: 142 MMOL/L (ref 135–147)
WBC # BLD AUTO: 7.33 THOUSAND/UL (ref 4.31–10.16)

## 2022-05-09 PROCEDURE — 96361 HYDRATE IV INFUSION ADD-ON: CPT

## 2022-05-09 PROCEDURE — 36415 COLL VENOUS BLD VENIPUNCTURE: CPT

## 2022-05-09 PROCEDURE — 85025 COMPLETE CBC W/AUTO DIFF WBC: CPT

## 2022-05-09 PROCEDURE — 96360 HYDRATION IV INFUSION INIT: CPT

## 2022-05-09 PROCEDURE — 93005 ELECTROCARDIOGRAM TRACING: CPT

## 2022-05-09 PROCEDURE — 99284 EMERGENCY DEPT VISIT MOD MDM: CPT

## 2022-05-09 PROCEDURE — 80048 BASIC METABOLIC PNL TOTAL CA: CPT

## 2022-05-09 PROCEDURE — 99285 EMERGENCY DEPT VISIT HI MDM: CPT

## 2022-05-09 RX ORDER — ACETAMINOPHEN 325 MG/1
650 TABLET ORAL ONCE
Status: COMPLETED | OUTPATIENT
Start: 2022-05-09 | End: 2022-05-09

## 2022-05-09 RX ADMIN — ACETAMINOPHEN 650 MG: 325 TABLET ORAL at 13:14

## 2022-05-09 RX ADMIN — SODIUM CHLORIDE 1000 ML: 0.9 INJECTION, SOLUTION INTRAVENOUS at 13:01

## 2022-05-09 NOTE — ED PROVIDER NOTES
History  Chief Complaint   Patient presents with    Headache     high blood pressure dizzy     79year-old previously healthy female presents to the emergency department with an incidental blood pressure reading of 245/131 at home  She is not on any anti-hypertensive medications  She called her doctor who informed her to come to the emergency department for evaluation as he is on vacation for 1 month  She reports that she has had lightheadedness since Friday as well  Patient is not on blood thinners or aspirin  She denies chest pain, shortness a breath, headache, abdominal pain, difficulty urinating, changes in bowel movements  No history of stroke or heart attacks  When asked about her fluid intake, she states that she rarely drinks water  History provided by:  Patient   used: No    Hypertension  Severity:  Moderate  Onset quality:  Gradual  Progression:  Worsening  Chronicity:  New  Notable PTA blood pressures:  245/131  Relieved by:  None tried  Ineffective treatments:  None tried  Associated symptoms: no abdominal pain, no blurred vision, no chest pain, no confusion, no dizziness, no ear pain, no epistaxis, no fever, no headaches, no hematuria, no hypokalemia, no loss of consciousness, no nausea, no neck pain, no palpitations, no shortness of breath, no syncope, not vomiting and no weakness    Risk factors: family hx of HTN and obesity    Risk factors: no alcohol use, no cardiac disease, no diabetes and no prior stroke        Prior to Admission Medications   Prescriptions Last Dose Informant Patient Reported? Taking?    albuterol (PROVENTIL HFA,VENTOLIN HFA) 90 mcg/act inhaler  Self No No   Sig: Inhale 2 puffs every 4 (four) hours as needed for wheezing   Patient not taking: Reported on 1/21/2021   benzonatate (TESSALON PERLES) 100 mg capsule  Self No No   Sig: Take 1 capsule (100 mg total) by mouth every 8 (eight) hours   Patient not taking: Reported on 1/21/2021   capsaicin (ZOSTRIX) 0 025 % cream   No No   Sig: Apply 1 application topically 2 (two) times a day   Patient not taking: Reported on 9/24/2021   ciclopirox (PENLAC) 8 % solution   No No   Sig: Apply topically daily at bedtime   estradiol (ESTRACE) 0 1 mg/g vaginal cream   No No   Sig: USE NIGHTLY FOR 2 WEEKS THEN USE 2 TO 3 TIMES A WEEK   fenofibrate (TRICOR) 145 mg tablet  Self Yes No   Sig: Take 145 mg by mouth daily   meclizine (ANTIVERT) 25 mg tablet   No No   Sig: Take 1 tablet (25 mg total) by mouth 3 (three) times a day as needed for dizziness for up to 21 days   methocarbamol (ROBAXIN) 500 mg tablet   No No   Sig: Take 1 tablet (500 mg total) by mouth 2 (two) times a day   naproxen (NAPROSYN) 375 mg tablet   No No   Sig: Take 1 tablet (375 mg total) by mouth 2 (two) times a day with meals   Patient not taking: Reported on 9/24/2021   omeprazole (PriLOSEC) 20 mg delayed release capsule  Self Yes No   Sig: Take 20 mg by mouth daily   traMADol (ULTRAM) 50 mg tablet  Self No No   Sig: Take 1-2 tabs p  O  Daily Q 6 hours p r n   Pain    zolpidem (AMBIEN) 10 mg tablet  Self Yes No   Sig: Take 1 tablet by mouth daily at bedtime      Facility-Administered Medications: None       Past Medical History:   Diagnosis Date    CPAP (continuous positive airway pressure) dependence     does not use machine    DJD (degenerative joint disease)     Gait abnormality     GERD (gastroesophageal reflux disease)     History of transfusion     Hyperlipidemia     Obesity (BMI 35 0-39 9 without comorbidity)     Sleep apnea        Past Surgical History:   Procedure Laterality Date    BLADDER SUSPENSION      CHOLECYSTECTOMY      COLONOSCOPY      CYSTOSCOPY N/A 1/23/2018    Procedure: CYSTOSCOPY;  Surgeon: Bakari Wilson MD;  [facility];  Service: Gynecology Oncology    HYSTERECTOMY      JOINT REPLACEMENT Bilateral     NE HYSTEROSCOPY,W/ENDO BX N/A 12/1/2017    Procedure: DILATATION AND CURETTAGE (D&C) WITH HYSTEROSCOPY; POLYPECTOMY;  Surgeon: Sebastian Quiles MD;  [facility];  Service: Gynecology    ND LAPAROSCOPY W TOT HYSTERECTUTERUS <=250 Shira Comes TUBE/OVARY N/A 1/23/2018    Procedure: ROBOTIC ASSISTED TOTAL LAPAROSCOPIC HYSTERECTOMY; BILATERAL SALPINGOOPHERECTOMY;  Surgeon: Hany Link MD;  [facility];  Service: Gynecology Oncology    REDUCTION MAMMAPLASTY Bilateral 18yrs ago    REPLACEMENT TOTAL KNEE BILATERAL      ROTATOR CUFF REPAIR Left     TUBAL LIGATION         Family History   Problem Relation Age of Onset    No Known Problems Mother     Emphysema Father     Breast cancer Maternal Grandmother [de-identified]    Bone cancer Sister     No Known Problems Daughter     No Known Problems Sister     No Known Problems Sister     No Known Problems Sister     No Known Problems Maternal Aunt     No Known Problems Maternal Aunt     No Known Problems Paternal Aunt     No Known Problems Paternal Aunt     No Known Problems Paternal Aunt     No Known Problems Paternal Aunt     Ovarian cancer Neg Hx     Colon cancer Neg Hx      I have reviewed and agree with the history as documented  E-Cigarette/Vaping    E-Cigarette Use Never User      E-Cigarette/Vaping Substances    Nicotine No     THC No     CBD No     Flavoring No     Other No     Unknown No      Social History     Tobacco Use    Smoking status: Never Smoker    Smokeless tobacco: Never Used   Vaping Use    Vaping Use: Never used   Substance Use Topics    Alcohol use: No    Drug use: No       Review of Systems   Constitutional: Negative for chills and fever  HENT: Negative for ear pain, nosebleeds, sore throat and trouble swallowing  Eyes: Negative for blurred vision, pain and visual disturbance  Respiratory: Negative for cough and shortness of breath  Cardiovascular: Negative for chest pain, palpitations, leg swelling and syncope     Gastrointestinal: Negative for abdominal pain, blood in stool, constipation, diarrhea, nausea and vomiting  Genitourinary: Negative for dysuria and hematuria  Musculoskeletal: Negative for arthralgias, back pain and neck pain  Skin: Negative for color change and rash  Neurological: Positive for light-headedness  Negative for dizziness, seizures, loss of consciousness, syncope, weakness, numbness and headaches  Psychiatric/Behavioral: Negative for confusion  All other systems reviewed and are negative  Physical Exam  Physical Exam  Vitals and nursing note reviewed  Constitutional:       General: She is not in acute distress  Appearance: Normal appearance  She is well-developed  She is obese  HENT:      Head: Normocephalic and atraumatic  Right Ear: External ear normal       Left Ear: External ear normal       Nose: Nose normal       Mouth/Throat:      Mouth: Mucous membranes are moist       Pharynx: Oropharynx is clear  No posterior oropharyngeal erythema  Eyes:      General: No scleral icterus  Right eye: No discharge  Left eye: No discharge  Extraocular Movements: Extraocular movements intact  Conjunctiva/sclera: Conjunctivae normal       Pupils: Pupils are equal, round, and reactive to light  Funduscopic exam:     Right eye: No papilledema  Red reflex present  Left eye: No papilledema  Red reflex present  Cardiovascular:      Rate and Rhythm: Normal rate and regular rhythm  Pulses: Normal pulses  Heart sounds: Normal heart sounds  No murmur heard  Pulmonary:      Effort: Pulmonary effort is normal  No respiratory distress  Breath sounds: Normal breath sounds  No rales  Chest:      Chest wall: No tenderness  Abdominal:      Palpations: Abdomen is soft  Tenderness: There is no abdominal tenderness  There is no right CVA tenderness or left CVA tenderness  Musculoskeletal:         General: No swelling, tenderness or signs of injury  Normal range of motion        Cervical back: Normal range of motion and neck supple  No rigidity or tenderness  Skin:     General: Skin is warm and dry  Capillary Refill: Capillary refill takes less than 2 seconds  Neurological:      General: No focal deficit present  Mental Status: She is alert and oriented to person, place, and time  Mental status is at baseline  Motor: No weakness  Gait: Gait normal    Psychiatric:         Mood and Affect: Mood normal          Behavior: Behavior normal          Thought Content:  Thought content normal          Vital Signs  ED Triage Vitals   Temperature Pulse Respirations Blood Pressure SpO2   05/09/22 1217 05/09/22 1142 05/09/22 1142 05/09/22 1142 05/09/22 1142   97 5 °F (36 4 °C) 84 20 156/81 98 %      Temp Source Heart Rate Source Patient Position - Orthostatic VS BP Location FiO2 (%)   05/09/22 1217 -- 05/09/22 1255 05/09/22 1142 --   Oral  Lying - Orthostatic VS Left arm       Pain Score       05/09/22 1142       7           Vitals:    05/09/22 1142 05/09/22 1255 05/09/22 1257 05/09/22 1300   BP: 156/81 137/62 139/67 138/66   Pulse: 84 62 61 60   Patient Position - Orthostatic VS:  Lying - Orthostatic VS Sitting - Orthostatic VS Standing - Orthostatic VS         Visual Acuity  Visual Acuity      Most Recent Value   L Pupil Size (mm) 2   R Pupil Size (mm) 2          ED Medications  Medications   sodium chloride 0 9 % bolus 1,000 mL (0 mL Intravenous Stopped 5/9/22 1447)   acetaminophen (TYLENOL) tablet 650 mg (650 mg Oral Given 5/9/22 1314)       Diagnostic Studies  Results Reviewed     Procedure Component Value Units Date/Time    Basic metabolic panel [[id number]] Collected: 05/09/22 1255    Lab Status: Final result Specimen: Blood from Line, Venous Updated: 05/09/22 1345     Sodium 142 mmol/L      Potassium 3 8 mmol/L      Chloride 105 mmol/L      CO2 28 mmol/L      ANION GAP 9 mmol/L      BUN 12 mg/dL      Creatinine 0 68 mg/dL      Glucose 74 mg/dL      Calcium 9 3 mg/dL      eGFR 90 ml/min/1 73sq m     Narrative: National Kidney Disease Foundation guidelines for Chronic Kidney Disease (CKD):     Stage 1 with normal or high GFR (GFR > 90 mL/min/1 73 square meters)    Stage 2 Mild CKD (GFR = 60-89 mL/min/1 73 square meters)    Stage 3A Moderate CKD (GFR = 45-59 mL/min/1 73 square meters)    Stage 3B Moderate CKD (GFR = 30-44 mL/min/1 73 square meters)    Stage 4 Severe CKD (GFR = 15-29 mL/min/1 73 square meters)    Stage 5 End Stage CKD (GFR <15 mL/min/1 73 square meters)  Note: GFR calculation is accurate only with a steady state creatinine    CBC and differential [[id number]]  (Abnormal) Collected: 05/09/22 1255    Lab Status: Final result Specimen: Blood from Line, Venous Updated: 05/09/22 1310     WBC 7 33 Thousand/uL      RBC 4 34 Million/uL      Hemoglobin 12 2 g/dL      Hematocrit 40 3 %      MCV 93 fL      MCH 28 1 pg      MCHC 30 3 g/dL      RDW 13 9 %      MPV 10 2 fL      Platelets 394 Thousands/uL      nRBC 0 /100 WBCs      Neutrophils Relative 51 %      Immat GRANS % 0 %      Lymphocytes Relative 33 %      Monocytes Relative 12 %      Eosinophils Relative 3 %      Basophils Relative 1 %      Neutrophils Absolute 3 67 Thousands/µL      Immature Grans Absolute 0 02 Thousand/uL      Lymphocytes Absolute 2 44 Thousands/µL      Monocytes Absolute 0 89 Thousand/µL      Eosinophils Absolute 0 24 Thousand/µL      Basophils Absolute 0 07 Thousands/µL                  No orders to display              Procedures  ECG 12 Lead Documentation Only    Date/Time: 5/9/2022 1:34 PM  Performed by: Daniella Zhao PA-C  Authorized by: Daniella Zhao PA-C     Indications / Diagnosis:  Lightheadedness  ECG reviewed by me, the ED Provider: yes    Patient location:  ED  Interpretation:     Interpretation: normal    Rate:     ECG rate:  64    ECG rate assessment: normal    Rhythm:     Rhythm: sinus rhythm    Ectopy:     Ectopy: none    QRS:     QRS axis:  Normal    QRS intervals:  Normal  Conduction: Conduction: normal    ST segments:     ST segments:  Normal  T waves:     T waves: flattening      Flattening:  I, aVL, aVF, V1, V2 and V6             ED Course  ED Course as of 05/10/22 0836   Mon May 09, 2022   1323 EKG shows no ST elevations or significant ST depressions concerning for acute coronary syndrome or Brugada syndrome  No evidence of AV block tachy dustin syndrome  No significantly shortened WA interval or delta wave to suggest Jaz-Parkinson-White syndrome  No significant LVH to suggest hypertrophic cardiomyopathy  QTC within normal limits and no epsilon wave to suggest arrhythmogenic right ventricular dysplasia  MDM  Number of Diagnoses or Management Options  Dehydration: minor  High blood pressure: new and requires workup  Diagnosis management comments: 79year old previously healthy female presents to the ED with lightheadedness and an incidental at-home BP reading of 245/131  BP reading here of 156/81  Vitals stable  Physical exam relatively unremarkable  Patient in no acute distress, awake, oriented  She is able to ambulate without difficulty  I ordered orthostatic vital signs, ECG, BMP, CBC for further evaluation  I gave the patient Tylenol and a fluid bolus  Neurological exam unremarkable  I do not feel head CT is warranted in this case  Labs relatively unremarkable  ECG shows no acute changes  Patient states that she is feeling better with the fluids  She was monitored for approximately 2 hours in the emergency department with no recurrence of her symptoms or high blood pressure  I suspect her lightheadedness is from dehydration  I advised her to follow-up with her primary care doctor for further evaluation of her blood pressure and potential management as an outpatient  Strict return precautions were discussed  Patient verbalized understanding of the assessment and plan and is amenable to discharge    Patient was discharged in stable condition  At discharge she stated that she has a follow-up appointment with her doctor and they will be discussing blood pressure management  Amount and/or Complexity of Data Reviewed  Clinical lab tests: ordered and reviewed  Review and summarize past medical records: yes  Discuss the patient with other providers: yes    Patient Progress  Patient progress: stable      Disposition  Final diagnoses:   Dehydration   High blood pressure     Time reflects when diagnosis was documented in both MDM as applicable and the Disposition within this note     Time User Action Codes Description Comment    5/9/2022  2:38 PM Aditi Matias Add [E86 0] Dehydration     5/9/2022  2:39 PM Aditi Matias Add [I10] High blood pressure     5/9/2022  2:39 PM Jaime Bis [E86 0] Dehydration     5/9/2022  2:39 PM Aditi Matias Modify [I10] High blood pressure       ED Disposition     ED Disposition Condition Date/Time Comment    Discharge Stable Mon May 9, 2022  2:42 PM [address] discharge to home/self care              Follow-up Information     Follow up With Specialties Details Why Contact Info Additional Information    Chris Novak MD Nephrology, Internal Medicine Call in 1 day follow up for further evaluation of symptoms [address] [facility] Emergency Department Emergency Medicine Go to  If symptoms worsen [address]  [place] [id number] [phone] [facility], [address] [place] [address] Kris butts, [address]          Discharge Medication List as of 5/9/2022  2:42 PM      CONTINUE these medications which have NOT CHANGED    Details   albuterol (PROVENTIL HFA,VENTOLIN HFA) 90 mcg/act inhaler Inhale 2 puffs every 4 (four) hours as needed for wheezing, Starting Wed 4/15/2020, Normal      benzonatate (TESSALON PERLES) 100 mg capsule Take 1 capsule (100 mg total) by mouth every 8 (eight) hours, Starting Wed 4/15/2020, Normal      capsaicin (ZOSTRIX) 0 025 % cream Apply 1 application topically 2 (two) times a day, Starting Thu 5/20/2021, Normal      ciclopirox (PENLAC) 8 % solution Apply topically daily at bedtime, Starting Tue 3/8/2022, Normal      estradiol (ESTRACE) 0 1 mg/g vaginal cream USE NIGHTLY FOR 2 WEEKS THEN USE 2 TO 3 TIMES A WEEK, Normal      fenofibrate (TRICOR) 145 mg tablet Take 145 mg by mouth daily, Historical Med      meclizine (ANTIVERT) 25 mg tablet Take 1 tablet (25 mg total) by mouth 3 (three) times a day as needed for dizziness for up to 21 days, Starting Sat 7/27/2019, Until Sat 8/17/2019, Print      methocarbamol (ROBAXIN) 500 mg tablet Take 1 tablet (500 mg total) by mouth 2 (two) times a day, Starting Thu 5/20/2021, Normal      naproxen (NAPROSYN) 375 mg tablet Take 1 tablet (375 mg total) by mouth 2 (two) times a day with meals, Starting Thu 5/20/2021, Normal      omeprazole (PriLOSEC) 20 mg delayed release capsule Take 20 mg by mouth daily, Historical Med      traMADol (ULTRAM) 50 mg tablet Take 1-2 tabs p  O  Daily Q 6 hours p r n  Pain , Print      zolpidem (AMBIEN) 10 mg tablet Take 1 tablet by mouth daily at bedtime, Starting Mon 4/29/2019, Historical Med             No discharge procedures on file      PDMP Review     None          ED Provider  Electronically Signed by           Carrie Pendleton PA-C  05/10/22 4249

## 2022-05-09 NOTE — DISCHARGE INSTRUCTIONS
Please talk to your doctor about blood pressure management and for follow up of symptoms  Return to ED if you develop significant changes or worsening condition

## 2022-05-10 LAB
ATRIAL RATE: 64 BPM
P AXIS: 57 DEGREES
PR INTERVAL: 166 MS
QRS AXIS: 24 DEGREES
QRSD INTERVAL: 94 MS
QT INTERVAL: 448 MS
QTC INTERVAL: 462 MS
T WAVE AXIS: 45 DEGREES
VENTRICULAR RATE: 64 BPM

## 2022-05-10 PROCEDURE — 93010 ELECTROCARDIOGRAM REPORT: CPT | Performed by: INTERNAL MEDICINE

## 2022-06-21 DIAGNOSIS — N95.2 ATROPHIC VAGINITIS: ICD-10-CM

## 2022-06-21 RX ORDER — ESTRADIOL 0.1 MG/G
CREAM VAGINAL
Qty: 42.5 G | Refills: 2 | Status: SHIPPED | OUTPATIENT
Start: 2022-06-21

## 2022-07-05 ENCOUNTER — TELEPHONE (OUTPATIENT)
Dept: OBGYN CLINIC | Facility: CLINIC | Age: 67
End: 2022-07-05

## 2022-07-05 NOTE — TELEPHONE ENCOUNTER
Pt currently is using estrace for vaginal irritation  When she used it, it was starting to burn  Then she started to use Azo and that was not helping   Wants to know if she can get another Rx       Please advise

## 2022-07-05 NOTE — TELEPHONE ENCOUNTER
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

## 2022-07-05 NOTE — TELEPHONE ENCOUNTER
Pt seen for annual with cs 9/24/21  Has been on estrace cream for several years for atrophic vaginitis  States she has intermittent problems with severe external redness and burning which she says was fiven an rx by a pa in our practice 1 year age, but I can find nothing    req rx cream to relieve burning  Did not use estrace externally  nka  [facility]

## 2022-07-27 ENCOUNTER — OFFICE VISIT (OUTPATIENT)
Dept: URGENT CARE | Facility: CLINIC | Age: 67
End: 2022-07-27
Payer: COMMERCIAL

## 2022-07-27 VITALS
BODY MASS INDEX: 35.55 KG/M2 | OXYGEN SATURATION: 93 % | DIASTOLIC BLOOD PRESSURE: 54 MMHG | SYSTOLIC BLOOD PRESSURE: 102 MMHG | HEART RATE: 103 BPM | HEIGHT: 68 IN | WEIGHT: 234.6 LBS | TEMPERATURE: 100 F | RESPIRATION RATE: 22 BRPM

## 2022-07-27 DIAGNOSIS — B34.9 VIRAL ILLNESS: ICD-10-CM

## 2022-07-27 DIAGNOSIS — R30.0 DYSURIA: Primary | ICD-10-CM

## 2022-07-27 DIAGNOSIS — J06.9 ACUTE URI: ICD-10-CM

## 2022-07-27 LAB
SL AMB  POCT GLUCOSE, UA: NEGATIVE
SL AMB LEUKOCYTE ESTERASE,UA: ABNORMAL
SL AMB POCT BILIRUBIN,UA: NEGATIVE
SL AMB POCT BLOOD,UA: NEGATIVE
SL AMB POCT CLARITY,UA: ABNORMAL
SL AMB POCT COLOR,UA: YELLOW
SL AMB POCT KETONES,UA: NEGATIVE
SL AMB POCT NITRITE,UA: NEGATIVE
SL AMB POCT PH,UA: 5
SL AMB POCT SPECIFIC GRAVITY,UA: 1.02
SL AMB POCT URINE PROTEIN: ABNORMAL
SL AMB POCT UROBILINOGEN: 0.2

## 2022-07-27 PROCEDURE — 87086 URINE CULTURE/COLONY COUNT: CPT | Performed by: PHYSICIAN ASSISTANT

## 2022-07-27 PROCEDURE — 81002 URINALYSIS NONAUTO W/O SCOPE: CPT | Performed by: PHYSICIAN ASSISTANT

## 2022-07-27 PROCEDURE — G0382 LEV 3 HOSP TYPE B ED VISIT: HCPCS | Performed by: PHYSICIAN ASSISTANT

## 2022-07-27 RX ORDER — BENZONATATE 100 MG/1
100 CAPSULE ORAL 3 TIMES DAILY PRN
Qty: 20 CAPSULE | Refills: 0 | Status: SHIPPED | OUTPATIENT
Start: 2022-07-27 | End: 2022-08-11

## 2022-07-27 RX ORDER — AMOXICILLIN AND CLAVULANATE POTASSIUM 875; 125 MG/1; MG/1
1 TABLET, FILM COATED ORAL EVERY 12 HOURS SCHEDULED
Qty: 14 TABLET | Refills: 0 | Status: SHIPPED | OUTPATIENT
Start: 2022-07-27 | End: 2022-08-03

## 2022-07-27 NOTE — PROGRESS NOTES
[employer] [employer] [facility]      NAME: Alok Flynn is a 79 y o  female  : 1955    MRN: [id number]  DATE: 2022  TIME: 6:17 PM    Assessment and Plan   Dysuria [R30 0]  1  Dysuria  POCT urine dip    Urine culture    amoxicillin-clavulanate (AUGMENTIN) 875-125 mg per tablet   2  Acute URI  benzonatate (TESSALON PERLES) 100 mg capsule   3  Viral illness         Patient Instructions   Dysuria   AMBULATORY CARE:   Dysuria  is trouble urinating, or pain, burning, or discomfort when you urinate  Dysuria is usually a symptom of another problem, such as a blockage or urinary tract infection  Common symptoms include the following:   · Fever     · Cloudy, bad smelling urine     · Urge to urinate often but urinating little     · Back, side, or abdominal pain     · Blood in your urine     · Discharge that smells bad     · Itching  Seek care immediately if:   · You have severe back, side, or abdominal pain  · You have fever and shaking chills  · You vomit several times in a row  Contact your healthcare provider if:   · Your symptoms do not go away, even after treatment  · You have questions or concerns about your condition or care  Treatment for dysuria  may include medicines to treat a bacterial infection or help decrease bladder spasms  Manage your dysuria:   · Drink more liquids  Liquids help flush out bacteria that may be causing an infection  Ask your healthcare provider how much liquid to drink each day and which liquids are best for you  · Take sitz baths as directed  Fill a bathtub with 4 to 6 inches of warm water  You may also use a sitz bath pan that fits over a toilet  Sit in the sitz bath for 20 minutes  Do this 2 to 3 times a day, or as directed  The warm water can help decrease pain and swelling  Follow up with your healthcare provider as directed:  Write down your questions so you remember to ask them during your visits     © 2017 Malcolm Welch Information is for End User's use only and may not be sold, redistributed or otherwise used for commercial purposes  All illustrations and images included in CareNotes® are the copyrighted property of A D A M , Inc  or Leonel Wang  The above information is an  only  It is not intended as medical advice for individual conditions or treatments  Talk to your doctor, nurse or pharmacist before following any medical regimen to see if it is safe and effective for you  I have prescribed an antibiotic for the infection  Please take the antibiotic as prescribed and finish the entire prescription  I recommend that the patient takes an over the counter probiotic or eats yogurt with live cultures in it [place]) to keep good bacteria in the gut and help prevent diarrhea  If not improving over the next 3-5 days, follow up with PCP  To present to the ER if symptoms worsen  Chief Complaint     Chief Complaint   Patient presents with   Mars Lesch Like Symptoms     Patient presents with vomiting, fever, diarrhea, vaginal irritation, cough, chills and  tongue burning that started Thursday  History of Present Illness   Amy Culver presents to the clinic with  c/o    - at home covid test, hx of pneumonia in the past     URI   This is a new problem  Episode onset:   The problem has been gradually worsening  There has been no fever  Associated symptoms include congestion, coughing, diarrhea (avg 3-4 watery diarrhea, no blood), dysuria and a sore throat  Pertinent negatives include no abdominal pain, chest pain, ear pain, headaches, rash, sinus pain or wheezing  Vomitinx per day  She has tried decongestant for the symptoms  The treatment provided no relief  Review of Systems   Review of Systems   Constitutional: Positive for chills and fever  Negative for diaphoresis and fatigue  HENT: Positive for congestion and sore throat   Negative for ear discharge, ear pain, facial swelling, sinus pressure and sinus pain  Eyes: Negative for photophobia, pain, discharge, redness, itching and visual disturbance  Respiratory: Positive for cough  Negative for apnea, chest tightness, shortness of breath and wheezing  Cardiovascular: Negative for chest pain and palpitations  Gastrointestinal: Positive for diarrhea (avg 3-4 watery diarrhea, no blood)  Negative for abdominal pain  Vomitinx per day  Genitourinary: Positive for dysuria  Negative for difficulty urinating, flank pain, frequency and urgency  Skin: Negative for color change, rash and wound  Neurological: Negative for dizziness and headaches  Hematological: Negative for adenopathy           Current Medications     Long-Term Medications   Medication Sig Dispense Refill    ciclopirox (PENLAC) 8 % solution Apply topically daily at bedtime 6 6 mL 0    estradiol (ESTRACE) 0 1 mg/g vaginal cream USE NIGHTLY FOR 2 WEEKS THEN USE 2 TO 3 TIMES A WEEK 42 5 g 2    fenofibrate (TRICOR) 145 mg tablet Take 145 mg by mouth daily      methocarbamol (ROBAXIN) 500 mg tablet Take 1 tablet (500 mg total) by mouth 2 (two) times a day 20 tablet 0    omeprazole (PriLOSEC) 20 mg delayed release capsule Take 20 mg by mouth daily      zolpidem (AMBIEN) 10 mg tablet Take 1 tablet by mouth daily at bedtime      capsaicin (ZOSTRIX) 0 025 % cream Apply 1 application topically 2 (two) times a day (Patient not taking: Reported on 2021) 60 g 0    meclizine (ANTIVERT) 25 mg tablet Take 1 tablet (25 mg total) by mouth 3 (three) times a day as needed for dizziness for up to 21 days 21 tablet 0    naproxen (NAPROSYN) 375 mg tablet Take 1 tablet (375 mg total) by mouth 2 (two) times a day with meals (Patient not taking: Reported on 2021) 20 tablet 0       Current Allergies     Allergies as of 2022 - Reviewed 2022   Allergen Reaction Noted    Doxycycline Hives 2021            The following portions of the patient's history were reviewed and updated as appropriate: allergies, current medications, past family history, past medical history, past social history, past surgical history and problem list   Past Medical History:   Diagnosis Date    CPAP (continuous positive airway pressure) dependence     does not use machine    DJD (degenerative joint disease)     Gait abnormality     GERD (gastroesophageal reflux disease)     History of transfusion     Hyperlipidemia     Obesity (BMI 35 0-39 9 without comorbidity)     Sleep apnea      Past Surgical History:   Procedure Laterality Date    BLADDER SUSPENSION      CHOLECYSTECTOMY      COLONOSCOPY      CYSTOSCOPY N/A 1/23/2018    Procedure: CYSTOSCOPY;  Surgeon: Kobi Muñiz MD;  [facility];  Service: Gynecology Oncology    HYSTERECTOMY      JOINT REPLACEMENT Bilateral     PA HYSTEROSCOPY,W/ENDO BX N/A 12/1/2017    Procedure: DILATATION AND CURETTAGE (D&C) WITH HYSTEROSCOPY; POLYPECTOMY;  Surgeon: Jose Cortez MD;  [facility];  Service: Gynecology    PA LAPAROSCOPY W TOT HYSTERECTUTERUS <=[address] [place]  W TUBE/OVARY N/A 1/23/2018    Procedure: ROBOTIC ASSISTED TOTAL LAPAROSCOPIC HYSTERECTOMY; BILATERAL SALPINGOOPHERECTOMY;  Surgeon: Kobi Muñiz MD;  [facility];  Service: Gynecology Oncology    REDUCTION MAMMAPLASTY Bilateral 18yrs ago    REPLACEMENT TOTAL KNEE BILATERAL      ROTATOR CUFF REPAIR Left     TUBAL LIGATION       Social History     Socioeconomic History    Marital status: /Civil Union     Spouse name: Not on file    Number of children: Not on file    Years of education: Not on file    Highest education level: Not on file   Occupational History    Not on file   Tobacco Use    Smoking status: Never Smoker    Smokeless tobacco: Never Used   Vaping Use    Vaping Use: Never used   Substance and Sexual Activity    Alcohol use: No    Drug use: No    Sexual activity: Yes     Partners: Male     Comment: with    Other Topics Concern    Not on file   Social History Narrative    Not on file     Social Determinants of Health     Financial Resource Strain: Not on file   Food Insecurity: Not on file   Transportation Needs: Not on file   Physical Activity: Not on file   Stress: Not on file   Social Connections: Not on file   Intimate Partner Violence: Not on file   Housing Stability: Not on file       Objective   /54   Pulse 103   Temp 100 °F (37 8 °C) (Oral)   Resp 22   Ht 5' 8" (1 727 m)   Wt 106 kg (234 lb 9 6 oz)   LMP  (LMP Unknown)   SpO2 93%   BMI 35 67 kg/m²      Physical Exam     Physical Exam  Vitals and nursing note reviewed  Constitutional:       General: She is not in acute distress  Appearance: She is well-developed  She is not diaphoretic  HENT:      Head: Normocephalic and atraumatic  Right Ear: Tympanic membrane and external ear normal       Left Ear: Tympanic membrane and external ear normal       Nose: Nose normal       Mouth/Throat:      Mouth: Mucous membranes are moist       Pharynx: Oropharynx is clear  No oropharyngeal exudate or posterior oropharyngeal erythema  Eyes:      General: No scleral icterus  Right eye: No discharge  Left eye: No discharge  Conjunctiva/sclera: Conjunctivae normal    Cardiovascular:      Rate and Rhythm: Normal rate and regular rhythm  Heart sounds: Normal heart sounds  No murmur heard  No friction rub  No gallop  Pulmonary:      Effort: Pulmonary effort is normal  No respiratory distress  Breath sounds: Normal breath sounds  No decreased breath sounds, wheezing, rhonchi or rales  Abdominal:      General: Bowel sounds are normal  There is no distension  Palpations: Abdomen is soft  Tenderness: There is abdominal tenderness in the suprapubic area  There is no guarding or rebound  Skin:     General: Skin is warm and dry  Coloration: Skin is not pale  Findings: No erythema or rash     Neurological: Mental Status: She is alert and oriented to person, place, and time  Psychiatric:         Behavior: Behavior normal          Thought Content:  Thought content normal          Judgment: Judgment normal          Henrique Jama PA-C

## 2022-07-29 LAB — BACTERIA UR CULT: NORMAL

## 2022-08-04 ENCOUNTER — TELEPHONE (OUTPATIENT)
Dept: FAMILY MEDICINE CLINIC | Facility: CLINIC | Age: 67
End: 2022-08-04

## 2022-08-04 NOTE — TELEPHONE ENCOUNTER
Can keep as new pt -- 40 minute appointment is fine -- we will review her hospital stay and order any appropriate follow up when she comes in

## 2022-08-04 NOTE — TELEPHONE ENCOUNTER
I spoke with Donna Bearing she originally had an appt with you as a new patient on 8/23/21 which she made awhile ago  She was just in the hospital for pneumonia  She needed a dejon so I changed her appt to 8/11 with you  However she needs a ct scan order for 3 weeks and labwork ordered for 1 week from dc  She does not have orders  Can you do this off the discharge summary or do you want to wait until she comes in? Also do you want me to keep her appt as a new patient or change to dejon appt? Is 40 min enough? Let me know - thanks!   DEJON INFO:  Admit 7/30  Dc 8/1 lv cassia dx pneumonia  Currently weak/lightheaded at times, nausea

## 2022-08-11 ENCOUNTER — OFFICE VISIT (OUTPATIENT)
Dept: FAMILY MEDICINE CLINIC | Facility: CLINIC | Age: 67
End: 2022-08-11
Payer: COMMERCIAL

## 2022-08-11 VITALS
HEART RATE: 78 BPM | DIASTOLIC BLOOD PRESSURE: 68 MMHG | OXYGEN SATURATION: 95 % | HEIGHT: 68 IN | SYSTOLIC BLOOD PRESSURE: 102 MMHG | WEIGHT: 238 LBS | BODY MASS INDEX: 36.07 KG/M2 | TEMPERATURE: 99 F

## 2022-08-11 DIAGNOSIS — R42 DIZZINESS: ICD-10-CM

## 2022-08-11 DIAGNOSIS — F51.01 PRIMARY INSOMNIA: ICD-10-CM

## 2022-08-11 DIAGNOSIS — Z23 ENCOUNTER FOR IMMUNIZATION: ICD-10-CM

## 2022-08-11 DIAGNOSIS — J18.9 PNEUMONIA OF LEFT LOWER LOBE DUE TO INFECTIOUS ORGANISM: Primary | ICD-10-CM

## 2022-08-11 DIAGNOSIS — I10 PRIMARY HYPERTENSION: ICD-10-CM

## 2022-08-11 DIAGNOSIS — K21.9 GASTROESOPHAGEAL REFLUX DISEASE, UNSPECIFIED WHETHER ESOPHAGITIS PRESENT: ICD-10-CM

## 2022-08-11 DIAGNOSIS — E78.1 PURE HYPERTRIGLYCERIDEMIA: ICD-10-CM

## 2022-08-11 DIAGNOSIS — R39.9 UTI SYMPTOMS: ICD-10-CM

## 2022-08-11 DIAGNOSIS — Z12.31 ENCOUNTER FOR SCREENING MAMMOGRAM FOR MALIGNANT NEOPLASM OF BREAST: ICD-10-CM

## 2022-08-11 PROBLEM — Z78.0 POSTMENOPAUSAL: Status: RESOLVED | Noted: 2017-12-01 | Resolved: 2022-08-11

## 2022-08-11 PROBLEM — N76.0 BACTERIAL VAGINITIS: Status: RESOLVED | Noted: 2018-10-08 | Resolved: 2022-08-11

## 2022-08-11 PROBLEM — G89.18 POST-OPERATIVE PAIN: Status: RESOLVED | Noted: 2018-02-01 | Resolved: 2022-08-11

## 2022-08-11 PROBLEM — Z98.890 POSTOPERATIVE STATE: Status: RESOLVED | Noted: 2018-01-31 | Resolved: 2022-08-11

## 2022-08-11 PROBLEM — R93.89 THICKENED ENDOMETRIUM: Status: RESOLVED | Noted: 2017-12-01 | Resolved: 2022-08-11

## 2022-08-11 PROBLEM — R93.5 ABNORMAL CT OF THE ABDOMEN: Status: RESOLVED | Noted: 2018-03-01 | Resolved: 2022-08-11

## 2022-08-11 PROBLEM — N85.02 COMPLEX ATYPICAL ENDOMETRIAL HYPERPLASIA: Status: RESOLVED | Noted: 2018-01-23 | Resolved: 2022-08-11

## 2022-08-11 PROBLEM — F41.9 ANXIETY: Status: RESOLVED | Noted: 2018-01-11 | Resolved: 2022-08-11

## 2022-08-11 PROBLEM — N84.0 UTERINE POLYP: Status: RESOLVED | Noted: 2017-12-01 | Resolved: 2022-08-11

## 2022-08-11 PROBLEM — F41.9 ANXIETY: Status: ACTIVE | Noted: 2018-01-11

## 2022-08-11 PROBLEM — B96.89 BACTERIAL VAGINITIS: Status: RESOLVED | Noted: 2018-10-08 | Resolved: 2022-08-11

## 2022-08-11 LAB
SL AMB  POCT GLUCOSE, UA: NEGATIVE
SL AMB LEUKOCYTE ESTERASE,UA: ABNORMAL
SL AMB POCT BILIRUBIN,UA: NEGATIVE
SL AMB POCT BLOOD,UA: NEGATIVE
SL AMB POCT KETONES,UA: NEGATIVE
SL AMB POCT NITRITE,UA: NEGATIVE
SL AMB POCT PH,UA: 5.5
SL AMB POCT SPECIFIC GRAVITY,UA: >=1.03
SL AMB POCT URINE PROTEIN: NEGATIVE
SL AMB POCT UROBILINOGEN: ABNORMAL

## 2022-08-11 PROCEDURE — 99204 OFFICE O/P NEW MOD 45 MIN: CPT | Performed by: FAMILY MEDICINE

## 2022-08-11 PROCEDURE — 90471 IMMUNIZATION ADMIN: CPT

## 2022-08-11 PROCEDURE — 87086 URINE CULTURE/COLONY COUNT: CPT | Performed by: FAMILY MEDICINE

## 2022-08-11 PROCEDURE — 81002 URINALYSIS NONAUTO W/O SCOPE: CPT | Performed by: FAMILY MEDICINE

## 2022-08-11 PROCEDURE — 90715 TDAP VACCINE 7 YRS/> IM: CPT

## 2022-08-11 PROCEDURE — 3725F SCREEN DEPRESSION PERFORMED: CPT | Performed by: FAMILY MEDICINE

## 2022-08-11 RX ORDER — MECLIZINE HYDROCHLORIDE 25 MG/1
25 TABLET ORAL 3 TIMES DAILY PRN
Qty: 270 TABLET | Refills: 1 | Status: SHIPPED | OUTPATIENT
Start: 2022-08-11 | End: 2022-08-15 | Stop reason: SDUPTHER

## 2022-08-11 RX ORDER — MECLIZINE HYDROCHLORIDE 25 MG/1
25 TABLET ORAL 3 TIMES DAILY PRN
Qty: 90 TABLET | Refills: 0 | Status: SHIPPED | OUTPATIENT
Start: 2022-08-11 | End: 2022-08-11 | Stop reason: SDUPTHER

## 2022-08-11 RX ORDER — OMEPRAZOLE 20 MG/1
20 CAPSULE, DELAYED RELEASE ORAL DAILY
Qty: 90 CAPSULE | Refills: 1 | Status: SHIPPED | OUTPATIENT
Start: 2022-08-11

## 2022-08-11 RX ORDER — FENOFIBRATE 145 MG/1
145 TABLET, COATED ORAL DAILY
Qty: 90 TABLET | Refills: 1 | Status: SHIPPED | OUTPATIENT
Start: 2022-08-11

## 2022-08-11 RX ORDER — VALSARTAN AND HYDROCHLOROTHIAZIDE 160; 25 MG/1; MG/1
1 TABLET ORAL DAILY
COMMUNITY
Start: 2022-05-09 | End: 2022-08-11

## 2022-08-11 RX ORDER — MULTIVIT-MIN/IRON/FOLIC ACID/K 18-600-40
1 CAPSULE ORAL DAILY
COMMUNITY
End: 2022-08-25

## 2022-08-11 RX ORDER — SPIRONOLACTONE 25 MG
1 TABLET ORAL DAILY
COMMUNITY

## 2022-08-11 RX ORDER — VALSARTAN 160 MG/1
160 TABLET ORAL DAILY
Qty: 30 TABLET | Refills: 1 | Status: SHIPPED | OUTPATIENT
Start: 2022-08-11 | End: 2022-08-25 | Stop reason: SDUPTHER

## 2022-08-11 NOTE — PROGRESS NOTES
Luis Fernando Tineo 1955 female MRN: [id number]      ASSESSMENT/PLAN  Problem List Items Addressed This Visit        Digestive    Gastroesophageal reflux disease    Relevant Medications    omeprazole (PriLOSEC) 20 mg delayed release capsule       Cardiovascular and Mediastinum    Primary hypertension    Relevant Medications    valsartan (DIOVAN) 160 mg tablet    Other Relevant Orders    Comprehensive metabolic panel    Lipid panel       Other    Dizziness    Relevant Medications    meclizine (ANTIVERT) 25 mg tablet    Other Relevant Orders    CBC and differential    TSH, 3rd generation with Free T4 reflex    Vitamin B12    Vitamin D 25 hydroxy    Folate    Primary insomnia      Other Visit Diagnoses     Pneumonia of left lower lobe due to infectious organism    -  Primary    Relevant Orders    CBC and differential    CT chest wo contrast    UTI symptoms        Relevant Orders    POCT urine dip (Completed)    Urine culture    Encounter for screening mammogram for malignant neoplasm of breast        Relevant Orders    Mammo screening bilateral w 3d & cad    Pure hypertriglyceridemia        Relevant Medications    fenofibrate (TRICOR) 145 mg tablet    Other Relevant Orders    Comprehensive metabolic panel    Lipid panel        HTN/Chronic Lightheadedness: BP low normal on exam; given daily lightheadedness, will stop HCTZ and continue Valsartan alone  F/u in 2 weeks to monitor  Will also update labs to evaluate for possible anemia, thyroid abnormality, vitamin deficiency contributing to lightheadedness  If benign, consider referral to ENT for further evaluation of this and burning tongue  HLD: Continue fenofibrate  GERD: Continue PPI   Insomnia: Contract established today, last fill in July -- not due yet     Update kidney function to monitor for full resolution of JORGE  UA (+) leuks, otherwise benign  Will send for culture to confirm no infection   History more suggestive of vaginal irritation than UTI -- f/u with Gyn tomorrow as scheduled  Recheck CT Chest to monitor for resolution of PNA per radiology recommendation  Reviewed that symptoms can take 6-8 weeks to fully resolve  Good oxygenation and benign lung exam today  Mammogram UTD -- will be due in 10/2022, script provided  DEXA UTD -- will link records  CRC UTD -- will request records  Vaccinations: Pneumo UTD, TDap given, Shingles UTD, COVID UTD  Encouraged regular physical activity, varied diet, and regular eye exams (has dentures)    Future Appointments   Date Time Provider Ace Gottlieb   8/12/2022  7:00 AM Afshan Landis MD OBGYN [facility]   8/25/2022 11:20 AM DO ROSIO Keith  [facility]          SUBJECTIVE  CC: Establish Care (Pt dx'd with pneumonia [facility] 7/30/2022-Requesting repeat CT of chest and labs /Still having SOB, dizziness  ECG performed 7/30 nml  [facility] colonoscopy Dr Natacha Barnett yrs ago  Q5yrs due to polyps ) and UTI Symptoms      HPI:  Darryn Schultz is a 79 y o  female who presents to establish care  History reviewed and updated as below  HTN: Home BPs intermittently   HLD: On Fenofibrate   Insomnia: On Ambien   GERD: On PPI   Chronic dizziness: On daily Meclizine, feeling off balance -- has not had evaluation for this     7/27 -- cough, congestion, sore throat, diarrhea, dysuria -- given Augmentin for UTI, tessalon   Symptoms persistent, so pt went to ED   [facility] 7/30-8/1 -- presented due to cough, nausea/vomiting/diarrhea  CXR/CT (+) LLL infiltrate, JORGE Cr 1 56-->1 21/GFR 36   Given IVF and antibiotics   Incidentals: mild fatty liver, L-spine degenerative changes; Platelets 269     Going to gynecologist tomorrow, as she continues to have burning sensation with urination; previously had this and was given a cream with good improvement in symptoms     [facility]     Review of Systems   Constitutional: Negative for unexpected weight change     HENT: Negative for congestion, ear pain, rhinorrhea and sore throat  (+) tongue burning    Eyes: Negative for visual disturbance  Respiratory: Positive for cough (dry), shortness of breath (with exertion ) and wheezing (sometimes)  Negative for chest tightness  Cardiovascular: Negative for chest pain, palpitations and leg swelling  Gastrointestinal: Positive for abdominal pain, diarrhea and nausea (intermittent)  Negative for constipation and vomiting  Endocrine: Negative for polyuria  Genitourinary: Positive for dysuria  Negative for frequency  Neurological: Positive for light-headedness  Negative for dizziness and headaches (off and on )  Psychiatric/Behavioral: Positive for sleep disturbance (gets about4 hours with Ambien)         Historical Information   The patient history was reviewed and updated as follows:    Past Medical History:   Diagnosis Date    Bacterial vaginitis 10/8/2018    Complex atypical endometrial hyperplasia 1/23/2018    CPAP (continuous positive airway pressure) dependence     does not use machine    DJD (degenerative joint disease)     Gait abnormality     GERD (gastroesophageal reflux disease)     History of transfusion     Hyperlipidemia     Obesity (BMI 35 0-39 9 without comorbidity)     Sleep apnea     Uterine polyp 12/1/2017     Past Surgical History:   Procedure Laterality Date    BLADDER SUSPENSION      CHOLECYSTECTOMY      COLONOSCOPY      CYSTOSCOPY N/A 01/23/2018    Procedure: CYSTOSCOPY;  Surgeon: Da Feliciano MD;  [facility];  Service: Gynecology Oncology    EYE SURGERY      HYSTERECTOMY      JOINT REPLACEMENT Bilateral     IA HYSTEROSCOPY,W/ENDO BX N/A 12/01/2017    Procedure: DILATATION AND CURETTAGE (D&C) WITH HYSTEROSCOPY; POLYPECTOMY;  Surgeon: Ad Hyman MD;  [facility];  Service: Gynecology    IA LAPAROSCOPY W TOT HYSTERECTUTERUS <=250 GR[address]  W TUBE/OVARY N/A 01/23/2018    Procedure: ROBOTIC ASSISTED TOTAL LAPAROSCOPIC HYSTERECTOMY; BILATERAL SALPINGOOPHERECTOMY;  Surgeon: Mt Lyon MD;  [facility];  Service: Gynecology Oncology    REDUCTION MAMMAPLASTY Bilateral 18yrs ago    REPLACEMENT TOTAL KNEE BILATERAL      ROTATOR CUFF REPAIR Left     TUBAL LIGATION       Family History   Problem Relation Age of Onset    No Known Problems Mother     Emphysema Father     Bone cancer Sister     Lung cancer Sister     Brain cancer Sister     No Known Problems Sister     No Known Problems Sister     No Known Problems Sister     No Known Problems Daughter     Breast cancer Maternal Grandmother [de-identified]    No Known Problems Maternal Aunt     No Known Problems Maternal Aunt     No Known Problems Paternal Aunt     No Known Problems Paternal Aunt     No Known Problems Paternal Aunt     No Known Problems Paternal Aunt     Ovarian cancer Neg Hx     Colon cancer Neg Hx       Social History   Social History     Substance and Sexual Activity   Alcohol Use No     Social History     Substance and Sexual Activity   Drug Use No     Social History     Tobacco Use   Smoking Status Never Smoker   Smokeless Tobacco Never Used       Medications:     Current Outpatient Medications:     Cholecalciferol (Vitamin D) 50 MCG (2000 UT) CAPS, Take 1 capsule by mouth in the morning, Disp: , Rfl:     estradiol (ESTRACE) 0 1 mg/g vaginal cream, USE NIGHTLY FOR 2 WEEKS THEN USE 2 TO 3 TIMES A WEEK, Disp: 42 5 g, Rfl: 2    fenofibrate (TRICOR) 145 mg tablet, Take 1 tablet (145 mg total) by mouth daily, Disp: 90 tablet, Rfl: 1    Lutein 20 MG CAPS, Take 1 capsule by mouth daily, Disp: , Rfl:     meclizine (ANTIVERT) 25 mg tablet, Take 1 tablet (25 mg total) by mouth 3 (three) times a day as needed for dizziness, Disp: 270 tablet, Rfl: 1    omeprazole (PriLOSEC) 20 mg delayed release capsule, Take 1 capsule (20 mg total) by mouth daily, Disp: 90 capsule, Rfl: 1    Propylene Glycol 0 6 % SOLN, Apply 1 drop to eye, Disp: , Rfl:    valsartan (DIOVAN) 160 mg tablet, Take 1 tablet (160 mg total) by mouth daily, Disp: 30 tablet, Rfl: 1    zolpidem (AMBIEN) 10 mg tablet, Take 1 tablet by mouth daily at bedtime, Disp: , Rfl:   Allergies   Allergen Reactions    Doxycycline Hives       OBJECTIVE    Vitals:   Vitals:    08/11/22 1254   BP: 102/68   BP Location: Left arm   Patient Position: Sitting   Cuff Size: Large   Pulse: 78   Temp: 99 °F (37 2 °C)   SpO2: 95%   Weight: 108 kg (238 lb)   Height: 5' 8" (1 727 m)           Physical Exam  Vitals and nursing note reviewed  Constitutional:       General: She is not in acute distress  Appearance: Normal appearance  HENT:      Head: Normocephalic and atraumatic  Right Ear: Tympanic membrane, ear canal and external ear normal       Left Ear: Tympanic membrane, ear canal and external ear normal       Nose: Nose normal       Mouth/Throat:      Mouth: Mucous membranes are moist       Pharynx: No oropharyngeal exudate or posterior oropharyngeal erythema  Eyes:      Conjunctiva/sclera: Conjunctivae normal    Cardiovascular:      Rate and Rhythm: Normal rate and regular rhythm  Pulmonary:      Effort: Pulmonary effort is normal  No respiratory distress  Breath sounds: Normal breath sounds  Abdominal:      General: Bowel sounds are normal  There is no distension  Palpations: Abdomen is soft  Tenderness: There is no abdominal tenderness  Musculoskeletal:      Right lower leg: No edema  Left lower leg: No edema  Lymphadenopathy:      Cervical: No cervical adenopathy  Skin:     General: Skin is warm and dry  Neurological:      General: No focal deficit present  Mental Status: She is alert     Psychiatric:         Mood and Affect: Mood normal                     DO Augusto Keith [address] [facility]   8/11/2022  1:45 PM

## 2022-08-12 ENCOUNTER — TELEPHONE (OUTPATIENT)
Dept: ADMINISTRATIVE | Facility: OTHER | Age: 67
End: 2022-08-12

## 2022-08-12 ENCOUNTER — OFFICE VISIT (OUTPATIENT)
Dept: OBGYN CLINIC | Facility: MEDICAL CENTER | Age: 67
End: 2022-08-12
Payer: COMMERCIAL

## 2022-08-12 VITALS
SYSTOLIC BLOOD PRESSURE: 112 MMHG | HEIGHT: 68 IN | BODY MASS INDEX: 35.64 KG/M2 | WEIGHT: 235.2 LBS | DIASTOLIC BLOOD PRESSURE: 72 MMHG

## 2022-08-12 DIAGNOSIS — N94.9 VAGINAL BURNING: Primary | ICD-10-CM

## 2022-08-12 LAB — BACTERIA UR CULT: NORMAL

## 2022-08-12 PROCEDURE — 99213 OFFICE O/P EST LOW 20 MIN: CPT | Performed by: OBSTETRICS & GYNECOLOGY

## 2022-08-12 NOTE — PATIENT INSTRUCTIONS
For vaginal discomfort:   Replens vaginal moisturizer   For external relief:   Okay for desitin, can also use aquaphor

## 2022-08-12 NOTE — TELEPHONE ENCOUNTER
----- Message from Patrica Muller LPN sent at [phone]  2:20 PM EDT -----  Regarding: COLONOSCOPY  08/11/22 2:22 PM    Hello, our patient Reina Gillespie has had CRC: Colonoscopy completed/performed  Please assist in updating the patient chart by making an External outreach to [facility] facility located in [place], [place]  The date of service is [id number]      Thank you,  Patrica Muller LPN   BRENNA[facility] LANDRUM

## 2022-08-12 NOTE — TELEPHONE ENCOUNTER
Upon review of the In Basket request and the patient's chart, initial outreach has been made via fax, please see Contacts section for details       Thank you  Jarred Nam

## 2022-08-12 NOTE — PROGRESS NOTES
Assessment/Plan:      Diagnoses and all orders for this visit:    Vaginal burning      No evidence of infection present today  Continue estrace once weekly if able  Can trial a course of replens vaginal moisturizer  Aquaphor externally for comfort  Subjective:     Patient ID: Teresa Goodell is a 79 y o  female  Vaginal itching and burning x 1 month  Has not been using estrace as prescribed secondary to cost limitations  Does not think it has helped any  Symptoms are primarily internal   She denies discharge and bleeding  Review of Systems      Objective:  /72 (BP Location: Left arm, Patient Position: Standing, Cuff Size: Standard)   Ht 5' 8" (1 727 m)   Wt 107 kg (235 lb 3 2 oz)   LMP  (LMP Unknown)   BMI 35 76 kg/m²      Physical Exam  Vitals reviewed  Constitutional:       Appearance: Normal appearance  Genitourinary:     General: Normal vulva  Comments: Atrophy present  Scant white discharge  + rectocele present  No erythema, irritation  Neurological:      Mental Status: She is alert

## 2022-08-12 NOTE — LETTER
Procedure Request Form: Colonoscopy      Date Requested: 22  Patient: Gissel Mellow  Patient : [birth year]   Referring Provider: Nita Jackson, MD        Date of Procedure ______________________________       The above patient has informed us that they have completed their   most recent Colonoscopy at your facility  Please complete   this form and attach all corresponding procedure reports/results  Comments __________________________________________________________  ____________________________________________________________________  ____________________________________________________________________  ____________________________________________________________________    Facility Completing Procedure _________________________________________    Form Completed By (print name) _______________________________________      Signature __________________________________________________________      These reports are needed for  compliance  Please fax this completed form and a copy of the procedure report to our office located at [place] [address] as soon as possible to [phone] attention Romayne Doyne: Phone [phone]    We thank you for your assistance in treating our mutual patient

## 2022-08-15 ENCOUNTER — TELEPHONE (OUTPATIENT)
Dept: FAMILY MEDICINE CLINIC | Facility: CLINIC | Age: 67
End: 2022-08-15

## 2022-08-15 DIAGNOSIS — R42 DIZZINESS: ICD-10-CM

## 2022-08-15 RX ORDER — MECLIZINE HYDROCHLORIDE 25 MG/1
25 TABLET ORAL 3 TIMES DAILY PRN
Qty: 90 TABLET | Refills: 0 | Status: SHIPPED | OUTPATIENT
Start: 2022-08-15 | End: 2023-01-13 | Stop reason: SDUPTHER

## 2022-08-15 NOTE — TELEPHONE ENCOUNTER
Natasha Barfield called asking for the jude #30 one tablet tid be phoned into [facility]  She said it was told to her this would be [facility] at [facility]  That one is ordered already just need the #30 until mailorder gets here      Pt aleyda alcantara- no need to call     Please erx to [facility]  Thanks

## 2022-08-15 NOTE — TELEPHONE ENCOUNTER
Upon review of the In Basket request we have found as a result of outreach that patient did not have the requested item(s) completed at location provided See document under media  Any additional questions or concerns should be emailed to the Practice Liaisons via [email or web address] email, please do not reply via In Basket      Thank you  Gus Wolfe

## 2022-08-20 ENCOUNTER — APPOINTMENT (OUTPATIENT)
Dept: LAB | Facility: CLINIC | Age: 67
End: 2022-08-20
Payer: COMMERCIAL

## 2022-08-20 DIAGNOSIS — E78.1 PURE HYPERTRIGLYCERIDEMIA: ICD-10-CM

## 2022-08-20 DIAGNOSIS — J18.9 PNEUMONIA OF LEFT LOWER LOBE DUE TO INFECTIOUS ORGANISM: ICD-10-CM

## 2022-08-20 DIAGNOSIS — R42 DIZZINESS: ICD-10-CM

## 2022-08-20 DIAGNOSIS — R73.01 IFG (IMPAIRED FASTING GLUCOSE): ICD-10-CM

## 2022-08-20 DIAGNOSIS — R79.89 ABNORMAL CBC: ICD-10-CM

## 2022-08-20 DIAGNOSIS — I10 PRIMARY HYPERTENSION: ICD-10-CM

## 2022-08-20 LAB
25(OH)D3 SERPL-MCNC: 27.2 NG/ML (ref 30–100)
ALBUMIN SERPL BCP-MCNC: 3.5 G/DL (ref 3.5–5)
ALP SERPL-CCNC: 44 U/L (ref 46–116)
ALT SERPL W P-5'-P-CCNC: 30 U/L (ref 12–78)
ANION GAP SERPL CALCULATED.3IONS-SCNC: 3 MMOL/L (ref 4–13)
AST SERPL W P-5'-P-CCNC: 27 U/L (ref 5–45)
BASOPHILS # BLD AUTO: 0.1 THOUSANDS/ΜL (ref 0–0.1)
BASOPHILS NFR BLD AUTO: 2 % (ref 0–1)
BILIRUB SERPL-MCNC: 0.41 MG/DL (ref 0.2–1)
BUN SERPL-MCNC: 11 MG/DL (ref 5–25)
CALCIUM SERPL-MCNC: 9 MG/DL (ref 8.3–10.1)
CHLORIDE SERPL-SCNC: 111 MMOL/L (ref 96–108)
CHOLEST SERPL-MCNC: 141 MG/DL
CO2 SERPL-SCNC: 28 MMOL/L (ref 21–32)
CREAT SERPL-MCNC: 0.86 MG/DL (ref 0.6–1.3)
EOSINOPHIL # BLD AUTO: 0.38 THOUSAND/ΜL (ref 0–0.61)
EOSINOPHIL NFR BLD AUTO: 6 % (ref 0–6)
ERYTHROCYTE [DISTWIDTH] IN BLOOD BY AUTOMATED COUNT: 14.1 % (ref 11.6–15.1)
FOLATE SERPL-MCNC: 6.2 NG/ML (ref 3.1–17.5)
GFR SERPL CREATININE-BSD FRML MDRD: 70 ML/MIN/1.73SQ M
GLUCOSE P FAST SERPL-MCNC: 116 MG/DL (ref 65–99)
HCT VFR BLD AUTO: 41.1 % (ref 34.8–46.1)
HDLC SERPL-MCNC: 35 MG/DL
HGB BLD-MCNC: 12.4 G/DL (ref 11.5–15.4)
IMM GRANULOCYTES # BLD AUTO: 0.02 THOUSAND/UL (ref 0–0.2)
IMM GRANULOCYTES NFR BLD AUTO: 0 % (ref 0–2)
LDLC SERPL CALC-MCNC: 87 MG/DL (ref 0–100)
LYMPHOCYTES # BLD AUTO: 2.27 THOUSANDS/ΜL (ref 0.6–4.47)
LYMPHOCYTES NFR BLD AUTO: 34 % (ref 14–44)
MCH RBC QN AUTO: 28.1 PG (ref 26.8–34.3)
MCHC RBC AUTO-ENTMCNC: 30.2 G/DL (ref 31.4–37.4)
MCV RBC AUTO: 93 FL (ref 82–98)
MONOCYTES # BLD AUTO: 0.6 THOUSAND/ΜL (ref 0.17–1.22)
MONOCYTES NFR BLD AUTO: 9 % (ref 4–12)
NEUTROPHILS # BLD AUTO: 3.23 THOUSANDS/ΜL (ref 1.85–7.62)
NEUTS SEG NFR BLD AUTO: 49 % (ref 43–75)
NONHDLC SERPL-MCNC: 106 MG/DL
NRBC BLD AUTO-RTO: 0 /100 WBCS
PLATELET # BLD AUTO: 395 THOUSANDS/UL (ref 149–390)
PMV BLD AUTO: 10.9 FL (ref 8.9–12.7)
POTASSIUM SERPL-SCNC: 4.1 MMOL/L (ref 3.5–5.3)
PROT SERPL-MCNC: 7.7 G/DL (ref 6.4–8.4)
RBC # BLD AUTO: 4.41 MILLION/UL (ref 3.81–5.12)
SODIUM SERPL-SCNC: 142 MMOL/L (ref 135–147)
TRIGL SERPL-MCNC: 94 MG/DL
TSH SERPL DL<=0.05 MIU/L-ACNC: 4.22 UIU/ML (ref 0.45–4.5)
VIT B12 SERPL-MCNC: 304 PG/ML (ref 100–900)
WBC # BLD AUTO: 6.6 THOUSAND/UL (ref 4.31–10.16)

## 2022-08-20 PROCEDURE — 84443 ASSAY THYROID STIM HORMONE: CPT

## 2022-08-20 PROCEDURE — 80053 COMPREHEN METABOLIC PANEL: CPT

## 2022-08-20 PROCEDURE — 36415 COLL VENOUS BLD VENIPUNCTURE: CPT

## 2022-08-20 PROCEDURE — 82306 VITAMIN D 25 HYDROXY: CPT

## 2022-08-20 PROCEDURE — 82746 ASSAY OF FOLIC ACID SERUM: CPT

## 2022-08-20 PROCEDURE — 83036 HEMOGLOBIN GLYCOSYLATED A1C: CPT

## 2022-08-20 PROCEDURE — 83540 ASSAY OF IRON: CPT

## 2022-08-20 PROCEDURE — 82728 ASSAY OF FERRITIN: CPT

## 2022-08-20 PROCEDURE — 83550 IRON BINDING TEST: CPT

## 2022-08-20 PROCEDURE — 85025 COMPLETE CBC W/AUTO DIFF WBC: CPT

## 2022-08-20 PROCEDURE — 82607 VITAMIN B-12: CPT

## 2022-08-20 PROCEDURE — 80061 LIPID PANEL: CPT

## 2022-08-21 DIAGNOSIS — R73.01 IFG (IMPAIRED FASTING GLUCOSE): Primary | ICD-10-CM

## 2022-08-21 DIAGNOSIS — R79.89 ABNORMAL CBC: ICD-10-CM

## 2022-08-21 LAB
EST. AVERAGE GLUCOSE BLD GHB EST-MCNC: 148 MG/DL
FERRITIN SERPL-MCNC: 33 NG/ML (ref 8–388)
HBA1C MFR BLD: 6.8 %
IRON SATN MFR SERPL: 15 % (ref 15–50)
IRON SERPL-MCNC: 65 UG/DL (ref 50–170)
TIBC SERPL-MCNC: 432 UG/DL (ref 250–450)

## 2022-08-21 PROCEDURE — 3044F HG A1C LEVEL LT 7.0%: CPT | Performed by: FAMILY MEDICINE

## 2022-08-25 ENCOUNTER — OFFICE VISIT (OUTPATIENT)
Dept: FAMILY MEDICINE CLINIC | Facility: CLINIC | Age: 67
End: 2022-08-25
Payer: COMMERCIAL

## 2022-08-25 VITALS
HEART RATE: 89 BPM | WEIGHT: 240 LBS | DIASTOLIC BLOOD PRESSURE: 62 MMHG | BODY MASS INDEX: 36.37 KG/M2 | OXYGEN SATURATION: 95 % | HEIGHT: 68 IN | TEMPERATURE: 98.3 F | SYSTOLIC BLOOD PRESSURE: 122 MMHG

## 2022-08-25 DIAGNOSIS — I10 PRIMARY HYPERTENSION: Primary | ICD-10-CM

## 2022-08-25 DIAGNOSIS — E11.9 TYPE 2 DIABETES MELLITUS WITHOUT COMPLICATION, WITH LONG-TERM CURRENT USE OF INSULIN (HCC): ICD-10-CM

## 2022-08-25 DIAGNOSIS — Z79.4 TYPE 2 DIABETES MELLITUS WITHOUT COMPLICATION, WITH LONG-TERM CURRENT USE OF INSULIN (HCC): ICD-10-CM

## 2022-08-25 DIAGNOSIS — R42 DIZZINESS: ICD-10-CM

## 2022-08-25 DIAGNOSIS — E55.9 VITAMIN D DEFICIENCY: ICD-10-CM

## 2022-08-25 DIAGNOSIS — E53.8 B12 DEFICIENCY: ICD-10-CM

## 2022-08-25 PROBLEM — R80.9 TYPE 2 DIABETES MELLITUS WITH MICROALBUMINURIA, WITH LONG-TERM CURRENT USE OF INSULIN (HCC): Status: ACTIVE | Noted: 2022-08-25

## 2022-08-25 PROBLEM — E11.29 TYPE 2 DIABETES MELLITUS WITH MICROALBUMINURIA, WITH LONG-TERM CURRENT USE OF INSULIN (HCC): Status: ACTIVE | Noted: 2022-08-25

## 2022-08-25 LAB
ALBUMIN/CREAT UR: ABNORMAL
CREAT UR-MCNC: 300 MG/DL
LEFT EYE DIABETIC RETINOPATHY: NORMAL
LEFT EYE IMAGE QUALITY: NORMAL
LEFT EYE MACULAR EDEMA: NORMAL
LEFT EYE OTHER RETINOPATHY: NORMAL
RIGHT EYE DIABETIC RETINOPATHY: NORMAL
RIGHT EYE IMAGE QUALITY: NORMAL
RIGHT EYE MACULAR EDEMA: NORMAL
RIGHT EYE OTHER RETINOPATHY: NORMAL
SEVERITY (EYE EXAM): NORMAL
SL AMB POCT UR MICROALBUMIN: ABNORMAL

## 2022-08-25 PROCEDURE — 3078F DIAST BP <80 MM HG: CPT | Performed by: FAMILY MEDICINE

## 2022-08-25 PROCEDURE — 4010F ACE/ARB THERAPY RXD/TAKEN: CPT | Performed by: FAMILY MEDICINE

## 2022-08-25 PROCEDURE — 2025F 7 FLD RTA PHOTO W/O RTNOPTHY: CPT | Performed by: FAMILY MEDICINE

## 2022-08-25 PROCEDURE — 92250 FUNDUS PHOTOGRAPHY W/I&R: CPT | Performed by: FAMILY MEDICINE

## 2022-08-25 PROCEDURE — 3061F NEG MICROALBUMINURIA REV: CPT | Performed by: FAMILY MEDICINE

## 2022-08-25 PROCEDURE — 82570 ASSAY OF URINE CREATININE: CPT | Performed by: FAMILY MEDICINE

## 2022-08-25 PROCEDURE — 99214 OFFICE O/P EST MOD 30 MIN: CPT | Performed by: FAMILY MEDICINE

## 2022-08-25 PROCEDURE — 3074F SYST BP LT 130 MM HG: CPT | Performed by: FAMILY MEDICINE

## 2022-08-25 PROCEDURE — 1160F RVW MEDS BY RX/DR IN RCRD: CPT | Performed by: FAMILY MEDICINE

## 2022-08-25 PROCEDURE — 82043 UR ALBUMIN QUANTITATIVE: CPT | Performed by: FAMILY MEDICINE

## 2022-08-25 RX ORDER — VALSARTAN 160 MG/1
160 TABLET ORAL DAILY
Qty: 90 TABLET | Refills: 1 | Status: SHIPPED | OUTPATIENT
Start: 2022-08-25

## 2022-08-25 RX ORDER — LANOLIN ALCOHOL/MO/W.PET/CERES
1000 CREAM (GRAM) TOPICAL DAILY
Qty: 90 TABLET | Refills: 1 | Status: SHIPPED | OUTPATIENT
Start: 2022-08-25

## 2022-08-25 NOTE — PROGRESS NOTES
Ton Easton 1955 female MRN: [id number]      ASSESSMENT/PLAN  Problem List Items Addressed This Visit        Endocrine    Type 2 diabetes mellitus with microalbuminuria, with long-term current use of insulin (HCC)       Cardiovascular and Mediastinum    Primary hypertension - Primary    Relevant Medications    valsartan (DIOVAN) 160 mg tablet       Other    B12 deficiency    Relevant Medications    vitamin B-12 (VITAMIN B-12) 1,000 mcg tablet    Dizziness    Vitamin D deficiency    Relevant Medications    Cholecalciferol (Vitamin D3) 125 MCG (5000 UT) TABS        HTN: Within goal, lightheadedness improved  Continue current regimen   Vit D Deficiency: Persistent despite supplement -- increase to 5000 IU daily   B12 Deficiency: Start daily supplement   DM: Well controlled, continue lifestyle modifications   Eye exam collected   Foot exam as below   Urine microalbumin/cr ratio (+) -- on ARB        Future Appointments   Date Time Provider Ace Gottlieb   9/23/2022 11:30 AM CA CT 1 [facility]   10/14/2022  3:00 PM [facility] MAMMO 1 [id number] [facility] [address]   11/29/2022  4:00 PM DO ROSIO Keith  [facility]          SUBJECTIVE  CC: Follow-up (2 wk follow up)      HPI:  Ton Easton is a 79 y o  female who presents for BP check and lab review  Stopped HCTZ due to chronic lightheadedness, borderline BP  Notes significant improvement in dizziness  Has not noted any swelling  TSH WNL  B12 304/Folate 6 2, D 27  Lipids: Total 141, LDL 87, HDL 35, TG 94; LFTs WNL   Cr 0 86/GFR 70  A1c 6 8% -- new onset diabetic   Ferritin 33, Iron 65    Review of Systems   Cardiovascular: Negative for leg swelling  Neurological: Positive for light-headedness (much improved)         Historical Information   The patient history was reviewed and updated as follows:    Past Medical History:   Diagnosis Date    Bacterial vaginitis 10/8/2018    Complex atypical endometrial hyperplasia 1/23/2018    CPAP (continuous positive airway pressure) dependence     does not use machine    DJD (degenerative joint disease)     Gait abnormality     GERD (gastroesophageal reflux disease)     History of transfusion     Hyperlipidemia     Obesity (BMI 35 0-39 9 without comorbidity)     Sleep apnea     Uterine polyp 12/1/2017     Past Surgical History:   Procedure Laterality Date    BLADDER SUSPENSION      CHOLECYSTECTOMY      COLONOSCOPY      CYSTOSCOPY N/A 01/23/2018    Procedure: CYSTOSCOPY;  Surgeon: Viktoria Rodríguez MD;  [facility];  Service: Gynecology Oncology    EYE SURGERY      HYSTERECTOMY      JOINT REPLACEMENT Bilateral     MD HYSTEROSCOPY,W/ENDO BX N/A 12/01/2017    Procedure: DILATATION AND CURETTAGE (D&C) WITH HYSTEROSCOPY; POLYPECTOMY;  Surgeon: Conner Vergara MD;  [facility];  Service: Gynecology    MD LAPAROSCOPY W TOT HYSTERECTUTERUS <=250 T[place]  W TUBE/OVARY N/A 01/23/2018    Procedure: ROBOTIC ASSISTED TOTAL LAPAROSCOPIC HYSTERECTOMY; BILATERAL SALPINGOOPHERECTOMY;  Surgeon: Viktoria Rodríguez MD;  [facility];  Service: Gynecology Oncology    REDUCTION MAMMAPLASTY Bilateral 18yrs ago    REPLACEMENT TOTAL KNEE BILATERAL      ROTATOR CUFF REPAIR Left     TUBAL LIGATION       Family History   Problem Relation Age of Onset    No Known Problems Mother     Emphysema Father     Bone cancer Sister     Lung cancer Sister     Brain cancer Sister     No Known Problems Sister     No Known Problems Sister     No Known Problems Sister     No Known Problems Daughter     Breast cancer Maternal Grandmother [de-identified]    No Known Problems Maternal Aunt     No Known Problems Maternal Aunt     No Known Problems Paternal Aunt     No Known Problems Paternal Aunt     No Known Problems Paternal Aunt     No Known Problems Paternal Aunt     Ovarian cancer Neg Hx     Colon cancer Neg Hx       Social History   Social History     Substance and Sexual Activity   Alcohol Use No     Social History     Substance and Sexual Activity   Drug Use No     Social History     Tobacco Use   Smoking Status Never Smoker   Smokeless Tobacco Never Used       Medications:     Current Outpatient Medications:     Cholecalciferol (Vitamin D3) 125 MCG (5000 UT) TABS, Take 1 tablet (5,000 Units total) by mouth daily, Disp: 90 tablet, Rfl: 1    estradiol (ESTRACE) 0 1 mg/g vaginal cream, USE NIGHTLY FOR 2 WEEKS THEN USE 2 TO 3 TIMES A WEEK, Disp: 42 5 g, Rfl: 2    fenofibrate (TRICOR) 145 mg tablet, Take 1 tablet (145 mg total) by mouth daily, Disp: 90 tablet, Rfl: 1    Lutein 20 MG CAPS, Take 1 capsule by mouth daily, Disp: , Rfl:     meclizine (ANTIVERT) 25 mg tablet, Take 1 tablet (25 mg total) by mouth 3 (three) times a day as needed for dizziness, Disp: 90 tablet, Rfl: 0    omeprazole (PriLOSEC) 20 mg delayed release capsule, Take 1 capsule (20 mg total) by mouth daily, Disp: 90 capsule, Rfl: 1    Propylene Glycol 0 6 % SOLN, Apply 1 drop to eye, Disp: , Rfl:     valsartan (DIOVAN) 160 mg tablet, Take 1 tablet (160 mg total) by mouth daily, Disp: 90 tablet, Rfl: 1    vitamin B-12 (VITAMIN B-12) 1,000 mcg tablet, Take 1 tablet (1,000 mcg total) by mouth daily, Disp: 90 tablet, Rfl: 1    zolpidem (AMBIEN) 10 mg tablet, Take 1 tablet by mouth daily at bedtime, Disp: , Rfl:   Allergies   Allergen Reactions    Doxycycline Hives       OBJECTIVE    Vitals:   Vitals:    08/25/22 1102   BP: 122/62   BP Location: Left arm   Patient Position: Sitting   Cuff Size: Large   Pulse: 89   Temp: 98 3 °F (36 8 °C)   SpO2: 95%   Weight: 109 kg (240 lb)   Height: 5' 8" (1 727 m)           Physical Exam  Vitals and nursing note reviewed  Constitutional:       General: She is not in acute distress  Appearance: Normal appearance  HENT:      Head: Normocephalic and atraumatic  Cardiovascular:      Pulses: no weak pulses          Dorsalis pedis pulses are 2+ on the right side and 2+ on the left side     Pulmonary: Effort: Pulmonary effort is normal  No respiratory distress  Feet:      Right foot:      Skin integrity: Callus and dry skin present  Left foot:      Skin integrity: Callus and dry skin present  Neurological:      General: No focal deficit present  Mental Status: She is alert  Psychiatric:         Mood and Affect: Mood normal           Patient's shoes and socks removed  Right Foot/Ankle   Right Foot Inspection  Skin Exam: dry skin, callus and callus  Toe Exam:  no right toe deformity    Sensory   Vibration: intact  Monofilament testing: intact    Vascular  Capillary refills: < 3 seconds  The right DP pulse is 2+  Left Foot/Ankle  Left Foot Inspection  Skin Exam: dry skin and callus  Toe Exam: No left toe deformity  Sensory   Vibration: intact  Monofilament testing: intact    Vascular  Capillary refills: < 3 seconds  The left DP pulse is 2+       Assign Risk Category  No deformity present  No loss of protective sensation  No weak pulses  Risk: 0              DO Izzy Keith [address] [facility]   8/25/2022  11:58 AM

## 2022-09-23 ENCOUNTER — HOSPITAL ENCOUNTER (OUTPATIENT)
Dept: CT IMAGING | Facility: HOSPITAL | Age: 67
End: 2022-09-23
Payer: COMMERCIAL

## 2022-09-23 DIAGNOSIS — J18.9 PNEUMONIA OF LEFT LOWER LOBE DUE TO INFECTIOUS ORGANISM: ICD-10-CM

## 2022-09-23 PROCEDURE — 71250 CT THORAX DX C-: CPT

## 2022-09-23 PROCEDURE — G1004 CDSM NDSC: HCPCS

## 2022-10-14 ENCOUNTER — HOSPITAL ENCOUNTER (OUTPATIENT)
Dept: MAMMOGRAPHY | Facility: HOSPITAL | Age: 67
End: 2022-10-14
Payer: COMMERCIAL

## 2022-10-14 VITALS — BODY MASS INDEX: 37.13 KG/M2 | WEIGHT: 245 LBS | HEIGHT: 68 IN

## 2022-10-14 DIAGNOSIS — Z12.31 ENCOUNTER FOR SCREENING MAMMOGRAM FOR MALIGNANT NEOPLASM OF BREAST: ICD-10-CM

## 2022-10-14 PROCEDURE — 77067 SCR MAMMO BI INCL CAD: CPT

## 2022-10-14 PROCEDURE — 77063 BREAST TOMOSYNTHESIS BI: CPT

## 2022-11-12 ENCOUNTER — OFFICE VISIT (OUTPATIENT)
Dept: URGENT CARE | Facility: CLINIC | Age: 67
End: 2022-11-12

## 2022-11-12 ENCOUNTER — APPOINTMENT (OUTPATIENT)
Dept: RADIOLOGY | Facility: CLINIC | Age: 67
End: 2022-11-12

## 2022-11-12 VITALS
RESPIRATION RATE: 18 BRPM | HEART RATE: 75 BPM | SYSTOLIC BLOOD PRESSURE: 130 MMHG | OXYGEN SATURATION: 100 % | HEIGHT: 68 IN | WEIGHT: 245 LBS | BODY MASS INDEX: 37.13 KG/M2 | TEMPERATURE: 98.3 F | DIASTOLIC BLOOD PRESSURE: 64 MMHG

## 2022-11-12 DIAGNOSIS — S92.301A CLOSED FRACTURE OF METATARSAL NECK, RIGHT, INITIAL ENCOUNTER: ICD-10-CM

## 2022-11-12 DIAGNOSIS — S99.921A FOOT INJURY, RIGHT, INITIAL ENCOUNTER: ICD-10-CM

## 2022-11-12 DIAGNOSIS — S99.911A ANKLE INJURY, RIGHT, INITIAL ENCOUNTER: Primary | ICD-10-CM

## 2022-11-12 DIAGNOSIS — S99.911A ANKLE INJURY, RIGHT, INITIAL ENCOUNTER: ICD-10-CM

## 2022-11-12 NOTE — PATIENT INSTRUCTIONS
Right foot metatarsal neck fractures of 3rd and 4th metatarsals  Right ankle sprain  Weightbearing as tolerated with a cam boot and use of cane or walker  May continue over-the-counter pain medications or the tramadol the you have  Ice/cool compress to the area of swelling and pain for 20 minutes 3-4 times daily  Elevate the leg to help swelling go down  Follow-up with podiatry for further evaluation and treatment

## 2022-11-12 NOTE — PROGRESS NOTES
[facility]        NAME: Carlos Escalante is a 79 y o  female  : 1955    MRN: [id number]  DATE: 2022  TIME: 10:20 AM    Assessment and Plan   Ankle injury, right, initial encounter [S99 911A]  1  Ankle injury, right, initial encounter  XR ankle 3+ vw right   2  Foot injury, right, initial encounter  XR foot 3+ vw right   3  Closed fracture of metatarsal neck, right, initial encounter  Ambulatory Referral to Podiatry         Patient Instructions   Right foot metatarsal neck fractures of 3rd and 4th metatarsals  Right ankle sprain  Weightbearing as tolerated with a cam boot and use of cane or walker  May continue over-the-counter pain medications or the tramadol the you have  Ice/cool compress to the area of swelling and pain for 20 minutes 3-4 times daily  Elevate the leg to help swelling go down  Follow-up with podiatry for further evaluation and treatment  Follow up with PCP in 3-5 days  Proceed to  ER if symptoms worsen  Chief Complaint     Chief Complaint   Patient presents with   • Foot Pain     Patient c/o of right foot pain and swelling after falling getting supplies out of closet  History of Present Illness       Patient presents for evaluation of right foot and ankle injury which occurred last night  Patient was taking out [employer], had a misstep and fell down to the ground twisting her right foot and ankle  Patient denies loss of consciousness  She feels she may have bumped her head but has no headache and no concussive symptoms  She has chronic neck pain but no new worsening neck pain, numbness tingling or weakness in extremities  She has difficulty with walking on her right foot due to pain  She took tramadol last night without significant reduction of pain  She iced the foot  Pain is localized mostly over the anterolateral aspect of the ankle and dorsum of the foot        Review of Systems   Review of Systems   Musculoskeletal: Positive for arthralgias (Right foot and ankle), gait problem and joint swelling  Skin: Negative for wound  Neurological: Negative for dizziness, syncope, light-headedness and headaches           Current Medications       Current Outpatient Medications:   •  Cholecalciferol (Vitamin D3) 125 MCG (5000 UT) TABS, Take 1 tablet (5,000 Units total) by mouth daily, Disp: 90 tablet, Rfl: 1  •  estradiol (ESTRACE) 0 1 mg/g vaginal cream, USE NIGHTLY FOR 2 WEEKS THEN USE 2 TO 3 TIMES A WEEK, Disp: 42 5 g, Rfl: 2  •  fenofibrate (TRICOR) 145 mg tablet, Take 1 tablet (145 mg total) by mouth daily, Disp: 90 tablet, Rfl: 1  •  Lutein 20 MG CAPS, Take 1 capsule by mouth daily, Disp: , Rfl:   •  meclizine (ANTIVERT) 25 mg tablet, Take 1 tablet (25 mg total) by mouth 3 (three) times a day as needed for dizziness, Disp: 90 tablet, Rfl: 0  •  omeprazole (PriLOSEC) 20 mg delayed release capsule, Take 1 capsule (20 mg total) by mouth daily, Disp: 90 capsule, Rfl: 1  •  Propylene Glycol 0 6 % SOLN, Apply 1 drop to eye, Disp: , Rfl:   •  valsartan (DIOVAN) 160 mg tablet, Take 1 tablet (160 mg total) by mouth daily, Disp: 90 tablet, Rfl: 1  •  vitamin B-12 (VITAMIN B-12) 1,000 mcg tablet, Take 1 tablet (1,000 mcg total) by mouth daily, Disp: 90 tablet, Rfl: 1  •  zolpidem (AMBIEN) 10 mg tablet, Take 1 tablet by mouth daily at bedtime, Disp: , Rfl:     Current Allergies     Allergies as of 11/12/2022 - Reviewed 11/12/2022   Allergen Reaction Noted   • Doxycycline Hives 05/20/2021            The following portions of the patient's history were reviewed and updated as appropriate: allergies, current medications, past family history, past medical history, past social history, past surgical history and problem list      Past Medical History:   Diagnosis Date   • Bacterial vaginitis 10/8/2018   • Complex atypical endometrial hyperplasia 1/23/2018   • CPAP (continuous positive airway pressure) dependence     does not use machine   • FRED (degenerative joint disease)    • Gait abnormality    • GERD (gastroesophageal reflux disease)    • History of transfusion    • Hyperlipidemia    • Obesity (BMI 35 0-39 9 without comorbidity)    • Sleep apnea    • Uterine polyp 12/1/2017       Past Surgical History:   Procedure Laterality Date   • BLADDER SUSPENSION     • CHOLECYSTECTOMY     • COLONOSCOPY     • CYSTOSCOPY N/A 01/23/2018    Procedure: CYSTOSCOPY;  Surgeon: Bakari Wilson MD;  [facility];  Service: Gynecology Oncology   • EYE SURGERY     • HYSTERECTOMY     • JOINT REPLACEMENT Bilateral    • RI HYSTEROSCOPY,W/ENDO BX N/A 12/01/2017    Procedure: DILATATION AND CURETTAGE (D&C) WITH HYSTEROSCOPY; POLYPECTOMY;  Surgeon: Irineo Leon MD;  [facility];  Service: Gynecology   • RI LAPAROSCOPY W TOT HYSTERECTUTERUS <=250 GRAM  W TUBE/OVARY N/A 01/23/2018    Procedure: ROBOTIC ASSISTED TOTAL LAPAROSCOPIC HYSTERECTOMY; BILATERAL SALPINGOOPHERECTOMY;  Surgeon: Bakari Wilson MD;  [facility];  Service: Gynecology Oncology   • REDUCTION MAMMAPLASTY Bilateral 18yrs ago   • REPLACEMENT TOTAL KNEE BILATERAL     • ROTATOR CUFF REPAIR Left    • TUBAL LIGATION         Family History   Problem Relation Age of Onset   • No Known Problems Mother    • Emphysema Father    • Bone cancer Sister    • Lung cancer Sister    • Brain cancer Sister    • No Known Problems Sister    • No Known Problems Sister    • No Known Problems Sister    • No Known Problems Daughter    • Breast cancer Maternal Grandmother [de-identified]   • No Known Problems Maternal Aunt    • No Known Problems Maternal Aunt    • No Known Problems Paternal Aunt    • No Known Problems Paternal Aunt    • No Known Problems Paternal Aunt    • No Known Problems Paternal Aunt    • Ovarian cancer Neg Hx    • Colon cancer Neg Hx          Medications have been verified          Objective   /64   Pulse 75   Temp 98 3 °F (36 8 °C) (Temporal)   Resp 18   Ht 5' 8" (1 727 m)   Wt 111 kg (245 lb)   LMP  (LMP Unknown)   SpO2 100%   BMI 37 25 kg/m²   No LMP recorded (lmp unknown)  Patient has had a hysterectomy  Physical Exam     Physical Exam  Constitutional:       Appearance: Normal appearance  She is not ill-appearing or toxic-appearing  Musculoskeletal:      Comments: Right lower extremity with mild evidence swelling over the dorsum of the foot and lateral ankle  No ecchymosis, no open wounds  No calf swelling or erythema  Negative calf tenderness  Negative pain with calf and tibia fibula squeeze  Tenderness to palpation over the lateral aspect of the ankle and anterolateral gutter  Mild tenderness over the tip of the medial malleolus  No Achilles or heel tenderness  Diffuse tenderness over the dorsum of the forefoot  Pain with movement of the toes  No significant tenderness at the base of 5th metatarsal   Skin is warm and dry, capillary refill less than 2 seconds  Gait is antalgic to the right  Neurological:      Mental Status: She is alert  X-ray of the right ankle shows no acute fracture or dislocation  X-ray of the right foot shows impacted and mildly angulated fracture of 3rd and 4th metacarpal necks and 4th metacarpal head      Orthopedic injury treatment    Date/Time: 11/12/2022 10:18 AM  Performed by: Robert Briggs MD  Authorized by: Robert Briggs MD     Patient Location:  [facility] Protocol:  Procedure performed by:  Consent given by: patient  Patient identity confirmed: verbally with patient      Injury location:  Foot  Location details:  Right foot  Injury type:  Fracture  Fracture type: third metatarsal and fourth metatarsal    Distal perfusion: normal    Neurological function: normal    Range of motion: reduced    Immobilization:  Other (comment) (CAM Boot)  Distal perfusion: normal    Neurological function: normal    Range of motion: unchanged    Patient tolerance:  Patient tolerated the procedure well with no immediate complications

## 2022-11-15 ENCOUNTER — OFFICE VISIT (OUTPATIENT)
Dept: PODIATRY | Facility: CLINIC | Age: 67
End: 2022-11-15

## 2022-11-15 VITALS
WEIGHT: 245 LBS | HEART RATE: 86 BPM | DIASTOLIC BLOOD PRESSURE: 66 MMHG | HEIGHT: 68 IN | BODY MASS INDEX: 37.13 KG/M2 | SYSTOLIC BLOOD PRESSURE: 114 MMHG

## 2022-11-15 DIAGNOSIS — M79.671 RIGHT FOOT PAIN: Primary | ICD-10-CM

## 2022-11-15 DIAGNOSIS — S92.301A CLOSED FRACTURE OF METATARSAL NECK, RIGHT, INITIAL ENCOUNTER: ICD-10-CM

## 2022-11-15 RX ORDER — NAPROXEN 500 MG/1
500 TABLET ORAL 2 TIMES DAILY WITH MEALS
Qty: 42 TABLET | Refills: 0 | Status: SHIPPED | OUTPATIENT
Start: 2022-11-15 | End: 2022-12-06

## 2022-11-16 NOTE — PROGRESS NOTES
Assessment/Plan:    No problem-specific Assessment & Plan notes found for this encounter  Diagnoses and all orders for this visit:    Right foot pain  -     naproxen (Naprosyn) 500 mg tablet; Take 1 tablet (500 mg total) by mouth 2 (two) times a day with meals for 21 days    Closed fracture of metatarsal neck, right, initial encounter  -     Ambulatory Referral to Podiatry  -     naproxen (Naprosyn) 500 mg tablet; Take 1 tablet (500 mg total) by mouth 2 (two) times a day with meals for 21 days      Plan:     -  Patient was counseled and educated on the condition and the diagnosis  The diagnosis, treatment options and prognosis were discussed in detail    - Right foot xrays reviewed, I personally interpreted findings with patient, Mildly displaced fractures of the distal aspects of the 3rd and 4th metatarsals   - Recommended conservative treatments WBAT with cam boot for essential activities only, rest, ice elevate  PRN pain control    - Rx for NSAIDs given   - return in 4-6 weeks, obtain new xrays     Subjective:      Patient ID: Craig Dubin is a 79 y o  female  Patient presents for evaluation of right foot pain secondary to an injury she sustained few days ago  Patient was taking out [employer], had a misstep and fell down to the ground twisting her right foot and ankle  She has difficulty with walking on her right foot due to pain  She took tramadol which she has prescription of  She iced the foot  Was evaluated in urgent care  Xrays were obtained  NO other complaints         The following portions of the patient's history were reviewed and updated as appropriate:   She  has a past medical history of Bacterial vaginitis (10/8/2018), Complex atypical endometrial hyperplasia (1/23/2018), CPAP (continuous positive airway pressure) dependence, DJD (degenerative joint disease), Gait abnormality, GERD (gastroesophageal reflux disease), History of transfusion, Hyperlipidemia, Obesity (BMI 35 0-39 9 without comorbidity), Sleep apnea, and Uterine polyp (12/1/2017)  She   Patient Active Problem List    Diagnosis Date Noted   • Type 2 diabetes mellitus with microalbuminuria, with long-term current use of insulin ([facility] ) 08/25/2022   • B12 deficiency 08/25/2022   • Vitamin D deficiency 08/25/2022   • Primary hypertension 08/11/2022   • Primary insomnia 08/11/2022   • Dizziness 08/11/2022   • Gastroesophageal reflux disease 08/11/2022   • Dyspareunia 06/16/2015     She  has a past surgical history that includes Joint replacement (Bilateral); Tubal ligation; Colonoscopy; Rotator cuff repair (Left); pr hysteroscopy,w/endo bx (N/A, 12/01/2017); pr laparoscopy w tot hysterectuterus <=250 gram  w tube/ovary (N/A, 01/23/2018); CYSTOSCOPY (N/A, 01/23/2018); Cholecystectomy; Bladder suspension; Replacement total knee bilateral; Reduction mammaplasty (Bilateral, 18yrs ago); Hysterectomy; and Eye surgery       Review of Systems   Constitutional: Negative for chills  Respiratory: Negative for shortness of breath  Gastrointestinal: Negative for vomiting  Musculoskeletal: Positive for arthralgias  Skin: Negative for color change  Neurological: Negative for dizziness  Psychiatric/Behavioral: Negative for agitation  Objective:      /66 (BP Location: Left arm, Patient Position: Sitting, Cuff Size: Standard)   Pulse 86   Ht 5' 8" (1 727 m)   Wt 111 kg (245 lb)   LMP  (LMP Unknown)   BMI 37 25 kg/m²          Physical Exam  Vitals reviewed  Constitutional:       Appearance: She is obese  Cardiovascular:      Rate and Rhythm: Normal rate  Pulses: Normal pulses  Pulmonary:      Effort: Pulmonary effort is normal    Musculoskeletal:         General: Swelling and tenderness present  Right foot: Swelling and tenderness present  Comments: Right foot pain with palpation noted to the 3rd and 4th metatarsal distally  No pain with MTPJ ROM  Right touch sensation intact   MSK status intact  Palpable pedal pulses  Generalized forefoot edema and mild ecchymosis noted  Skin:     General: Skin is warm and dry  Neurological:      General: No focal deficit present  Mental Status: She is alert

## 2022-11-25 ENCOUNTER — TELEPHONE (OUTPATIENT)
Dept: PODIATRY | Facility: CLINIC | Age: 67
End: 2022-11-25

## 2022-12-13 ENCOUNTER — APPOINTMENT (OUTPATIENT)
Dept: RADIOLOGY | Facility: CLINIC | Age: 67
End: 2022-12-13

## 2022-12-13 ENCOUNTER — OFFICE VISIT (OUTPATIENT)
Dept: PODIATRY | Facility: CLINIC | Age: 67
End: 2022-12-13

## 2022-12-13 VITALS
HEART RATE: 83 BPM | WEIGHT: 245 LBS | SYSTOLIC BLOOD PRESSURE: 117 MMHG | BODY MASS INDEX: 37.13 KG/M2 | HEIGHT: 68 IN | DIASTOLIC BLOOD PRESSURE: 76 MMHG

## 2022-12-13 DIAGNOSIS — S92.301A CLOSED FRACTURE OF METATARSAL NECK, RIGHT, INITIAL ENCOUNTER: ICD-10-CM

## 2022-12-13 DIAGNOSIS — S92.301D CLOSED FRACTURE OF NECK OF METATARSAL BONE OF RIGHT FOOT WITH ROUTINE HEALING, SUBSEQUENT ENCOUNTER: Primary | ICD-10-CM

## 2022-12-13 NOTE — PROGRESS NOTES
Assessment/Plan:    No problem-specific Assessment & Plan notes found for this encounter  Diagnoses and all orders for this visit:    Closed fracture of neck of metatarsal bone of right foot with routine healing, subsequent encounter  -     X-ray foot right 3+ views; Future      Plan:     -  Patient was counseled and educated on the condition and the diagnosis  The diagnosis, treatment options and prognosis were discussed in detail    -Right foot x-rays reviewed, I personally interpreted the x-ray finding with patient which is consistent with healing metatarsal neck fractures   -Continue weight-bear as tolerated in cam boot for additional 2 weeks then transition to sneakers  -Return as needed or follow-up in 4 weeks    Subjective:      Patient ID: Kimberly Cuevas is a 79 y o  female  59-year-old female with past medical history as below presents for continued treatment of right foot metatarsal fractures  Patient reports she has been wearing cam boot for the last 4 weeks and is doing well without any pain to her foot  Patient does report to me she has tried walking barefoot and tolerated okay  No other complaints at this time  The following portions of the patient's history were reviewed and updated as appropriate:   She  has a past medical history of Bacterial vaginitis (10/8/2018), Complex atypical endometrial hyperplasia (1/23/2018), CPAP (continuous positive airway pressure) dependence, DJD (degenerative joint disease), Gait abnormality, GERD (gastroesophageal reflux disease), History of transfusion, Hyperlipidemia, Obesity (BMI 35 0-39 9 without comorbidity), Sleep apnea, and Uterine polyp (12/1/2017)    She   Patient Active Problem List    Diagnosis Date Noted   • Type 2 diabetes mellitus with microalbuminuria, with long-term current use [facility] insulin [facility] ) 08/25/2022   • B12 deficiency 08/25/2022   • Vitamin D deficiency 08/25/2022   • Primary hypertension 08/11/2022   • Primary insomnia 08/11/2022   • Dizziness 08/11/2022   • Gastroesophageal reflux disease 08/11/2022   • Dyspareunia 06/16/2015     She  has a past surgical history that includes Joint replacement (Bilateral); Tubal ligation; Colonoscopy; Rotator cuff repair (Left); pr hysteroscopy bx endometrium&/polypc w/wo d&c (N/A, 12/01/2017); pr laps total hysterect 250 gm/< w/rmvl tube/ovary (N/A, 01/23/2018); CYSTOSCOPY (N/A, 01/23/2018); Cholecystectomy; Bladder suspension; Replacement total knee bilateral; Reduction mammaplasty (Bilateral, 18yrs ago); Hysterectomy; and Eye surgery       Review of Systems   Constitutional: Negative for chills  Respiratory: Negative for shortness of breath  Gastrointestinal: Negative for vomiting  Musculoskeletal: Positive for arthralgias  Skin: Negative for color change  Neurological: Negative for dizziness  Psychiatric/Behavioral: Negative for agitation  Objective:      /76 (BP Location: Right arm, Patient Position: Sitting, Cuff Size: Standard)   Pulse 83   Ht 5' 8" (1 727 m)   Wt 111 kg (245 lb) Comment: verbal, cam boot  LMP  (LMP Unknown)   BMI 37 25 kg/m²          Physical Exam  Vitals reviewed  Cardiovascular:      Rate and Rhythm: Normal rate  Pulses: Normal pulses  Pulmonary:      Effort: Pulmonary effort is normal    Musculoskeletal:         General: Tenderness present  Right foot: No tenderness  Comments: Pain with palpation noted to the right foot along the metatarsal necks  Lesser MTPJ range of motion within normal limits  Musculoskeletal status intact  Skin:     General: Skin is warm  Neurological:      General: No focal deficit present  Mental Status: She is alert     Psychiatric:         Mood and Affect: Mood normal

## 2023-01-09 ENCOUNTER — RA CDI HCC (OUTPATIENT)
Dept: OTHER | Facility: HOSPITAL | Age: 68
End: 2023-01-09

## 2023-01-09 NOTE — PROGRESS NOTES
[facility]  coding opportunities       Chart reviewed, no opportunity found: CHART REVIEWED, NO OPPORTUNITY FOUND        Patients Insurance        Commercial Insurance: [employer]

## 2023-01-13 ENCOUNTER — OFFICE VISIT (OUTPATIENT)
Dept: FAMILY MEDICINE CLINIC | Facility: CLINIC | Age: 68
End: 2023-01-13

## 2023-01-13 VITALS
WEIGHT: 243 LBS | TEMPERATURE: 99.9 F | OXYGEN SATURATION: 94 % | HEIGHT: 68 IN | SYSTOLIC BLOOD PRESSURE: 114 MMHG | DIASTOLIC BLOOD PRESSURE: 62 MMHG | BODY MASS INDEX: 36.83 KG/M2 | HEART RATE: 87 BPM

## 2023-01-13 DIAGNOSIS — F51.01 PRIMARY INSOMNIA: ICD-10-CM

## 2023-01-13 DIAGNOSIS — E53.8 B12 DEFICIENCY: ICD-10-CM

## 2023-01-13 DIAGNOSIS — E78.1 PURE HYPERTRIGLYCERIDEMIA: ICD-10-CM

## 2023-01-13 DIAGNOSIS — R80.9 TYPE 2 DIABETES MELLITUS WITH MICROALBUMINURIA, WITH LONG-TERM CURRENT USE OF INSULIN (HCC): Primary | ICD-10-CM

## 2023-01-13 DIAGNOSIS — Z23 NEED FOR PNEUMOCOCCAL VACCINATION: ICD-10-CM

## 2023-01-13 DIAGNOSIS — Z00.00 HEALTHCARE MAINTENANCE: ICD-10-CM

## 2023-01-13 DIAGNOSIS — E55.9 VITAMIN D DEFICIENCY: ICD-10-CM

## 2023-01-13 DIAGNOSIS — G89.29 CHRONIC NECK PAIN: ICD-10-CM

## 2023-01-13 DIAGNOSIS — Z79.899 LONG TERM USE OF DRUG: ICD-10-CM

## 2023-01-13 DIAGNOSIS — I10 PRIMARY HYPERTENSION: ICD-10-CM

## 2023-01-13 DIAGNOSIS — M79.671 RIGHT FOOT PAIN: ICD-10-CM

## 2023-01-13 DIAGNOSIS — M54.2 CHRONIC NECK PAIN: ICD-10-CM

## 2023-01-13 DIAGNOSIS — N95.2 ATROPHIC VAGINITIS: ICD-10-CM

## 2023-01-13 DIAGNOSIS — Z79.4 TYPE 2 DIABETES MELLITUS WITH MICROALBUMINURIA, WITH LONG-TERM CURRENT USE OF INSULIN (HCC): Primary | ICD-10-CM

## 2023-01-13 DIAGNOSIS — E11.29 TYPE 2 DIABETES MELLITUS WITH MICROALBUMINURIA, WITH LONG-TERM CURRENT USE OF INSULIN (HCC): Primary | ICD-10-CM

## 2023-01-13 DIAGNOSIS — K21.9 GASTROESOPHAGEAL REFLUX DISEASE, UNSPECIFIED WHETHER ESOPHAGITIS PRESENT: ICD-10-CM

## 2023-01-13 DIAGNOSIS — R42 DIZZINESS: ICD-10-CM

## 2023-01-13 LAB — SL AMB POCT HEMOGLOBIN AIC: 6.9 (ref ?–6.5)

## 2023-01-13 RX ORDER — VALSARTAN 160 MG/1
160 TABLET ORAL DAILY
Qty: 90 TABLET | Refills: 1 | Status: SHIPPED | OUTPATIENT
Start: 2023-01-13

## 2023-01-13 RX ORDER — ESTRADIOL 0.1 MG/G
CREAM VAGINAL
Qty: 42.5 G | Refills: 1 | Status: SHIPPED | OUTPATIENT
Start: 2023-01-13

## 2023-01-13 RX ORDER — NAPROXEN 500 MG/1
500 TABLET ORAL 2 TIMES DAILY PRN
Qty: 60 TABLET | Refills: 1 | Status: SHIPPED | OUTPATIENT
Start: 2023-01-13

## 2023-01-13 RX ORDER — FENOFIBRATE 145 MG/1
145 TABLET, COATED ORAL DAILY
Qty: 90 TABLET | Refills: 1 | Status: SHIPPED | OUTPATIENT
Start: 2023-01-13

## 2023-01-13 RX ORDER — OMEPRAZOLE 20 MG/1
20 CAPSULE, DELAYED RELEASE ORAL DAILY
Qty: 90 CAPSULE | Refills: 1 | Status: SHIPPED | OUTPATIENT
Start: 2023-01-13

## 2023-01-13 RX ORDER — ZOLPIDEM TARTRATE 10 MG/1
10 TABLET ORAL
Qty: 30 TABLET | Refills: 2 | Status: SHIPPED | OUTPATIENT
Start: 2023-01-13

## 2023-01-13 RX ORDER — MECLIZINE HYDROCHLORIDE 25 MG/1
25 TABLET ORAL 3 TIMES DAILY PRN
Qty: 90 TABLET | Refills: 1 | Status: SHIPPED | OUTPATIENT
Start: 2023-01-13

## 2023-01-13 NOTE — PROGRESS NOTES
Juanita Aranda 1955 female MRN: [id number]      ASSESSMENT/PLAN  Problem List Items Addressed This Visit        Digestive    Gastroesophageal reflux disease    Relevant Medications    omeprazole (PriLOSEC) 20 mg delayed release capsule       Endocrine    Type 2 diabetes mellitus with microalbuminuria, with long-term current use of insulin (HCC) - Primary    Relevant Orders    POCT hemoglobin A1c (Completed)    Comprehensive metabolic panel    Lipid panel    TSH, 3rd generation with Free T4 reflex       Cardiovascular and Mediastinum    Primary hypertension    Relevant Medications    valsartan (DIOVAN) 160 mg tablet    Other Relevant Orders    Comprehensive metabolic panel    Lipid panel       Genitourinary    Atrophic vaginitis    Relevant Medications    estradiol (ESTRACE) 0 1 mg/g vaginal cream       Other    B12 deficiency    Relevant Orders    Vitamin B12    Dizziness    Relevant Medications    meclizine (ANTIVERT) 25 mg tablet    Primary insomnia    Relevant Medications    zolpidem (AMBIEN) 10 mg tablet    Pure hypertriglyceridemia    Relevant Medications    fenofibrate (TRICOR) 145 mg tablet    Other Relevant Orders    Comprehensive metabolic panel    Lipid panel    Vitamin D deficiency    Relevant Orders    Vitamin D 25 hydroxy   Other Visit Diagnoses     Right foot pain        Relevant Medications    naproxen (Naprosyn) 500 mg tablet    Chronic neck pain        Relevant Orders    Ambulatory referral to Pain Management    Long term use of drug        Relevant Orders    CBC and differential    Healthcare maintenance        Need for pneumococcal vaccination        Relevant Orders    Pneumococcal Conjugate Vaccine 20-valent (Pcv20) (Completed)        DM: A1c 6 9% (up from 6 8)   HTN: Within goal   HLD: Update lipids     Referral provided for Pain Management   Update labs     Health Maintenance   Mammogram UTD  DEXA UTD  CRC UTD -- will request records (Dr Vahid Craig)   Vaccinations: Pneumo given, TDap UTD, Shingles UTD, Flu UTD, COVID UTD  Encouraged regular physical activity, varied diet, and regular dental/eye exams       No future appointments  SUBJECTIVE  CC: Blood Pressure Check, Follow-up, and Diabetes      HPI:  Imtiaz Woody is a 79 y o  female who presents for chronic follow up as below  DM: "I think good" with diet   HTN: Home BPs none   HLD: Tolerating Tricor without issues     Needs refills   Previously had nerve ablation on R side of her neck, now L side is starting to bother her, but her previous Pain Management provider left -- needs referral for new provider   Had two broken toes in November -- still bothering her, may seek second opinion     Review of Systems   Constitutional: Negative for unexpected weight change  HENT: Negative for congestion, ear pain, rhinorrhea and sore throat  Eyes: Positive for visual disturbance (sometimes)  Respiratory: Negative for cough and shortness of breath  Cardiovascular: Negative for chest pain, palpitations and leg swelling  Gastrointestinal: Negative for abdominal pain, constipation and diarrhea  Endocrine: Negative for polyuria  Genitourinary: Negative for dysuria  Musculoskeletal: Positive for neck pain  Neurological: Positive for dizziness (intermittent)  Negative for headaches  Psychiatric/Behavioral: Negative for sleep disturbance         Historical Information   The patient history was reviewed and updated as follows:    Past Medical History:   Diagnosis Date   • Bacterial vaginitis 10/8/2018   • Complex atypical endometrial hyperplasia 1/23/2018   • CPAP (continuous positive airway pressure) dependence     does not use machine   • DJD (degenerative joint disease)    • Dyspareunia 6/16/2015   • Gait abnormality    • GERD (gastroesophageal reflux disease)    • History of transfusion    • Hyperlipidemia    • Obesity (BMI 35 0-39 9 without comorbidity)    • Sleep apnea    • Uterine polyp 12/1/2017     Past Surgical History:   Procedure Laterality Date   • BLADDER SUSPENSION     • CHOLECYSTECTOMY     • COLONOSCOPY     • CYSTOSCOPY N/A 01/23/2018    Procedure: CYSTOSCOPY;  Surgeon: Da Feliciano MD;  [facility];  Service: Gynecology Oncology   • EYE SURGERY     • HYSTERECTOMY     • JOINT REPLACEMENT Bilateral    • FL HYSTEROSCOPY BX ENDOMETRIUM&/POLYPC W/WO D&C N/A 12/01/2017    Procedure: DILATATION AND CURETTAGE (D&C) WITH HYSTEROSCOPY; POLYPECTOMY;  Surgeon: Ad Hyman MD;  [facility];  Service: Gynecology   • FL LAPS TOTAL HYSTERECT 250 GM/< W/RMVL TUBE/OVARY N/A 01/23/2018    Procedure: ROBOTIC ASSISTED TOTAL LAPAROSCOPIC HYSTERECTOMY; BILATERAL SALPINGOOPHERECTOMY;  Surgeon: Da Feliciano MD;  [facility];  Service: Gynecology Oncology   • REDUCTION MAMMAPLASTY Bilateral 18yrs ago   • REPLACEMENT TOTAL KNEE BILATERAL     • ROTATOR CUFF REPAIR Left    • TUBAL LIGATION       Family History   Problem Relation Age of Onset   • No Known Problems Mother    • Emphysema Father    • Bone cancer Sister    • Lung cancer Sister    • Brain cancer Sister    • No Known Problems Sister    • No Known Problems Sister    • No Known Problems Sister    • No Known Problems Daughter    • Breast cancer Maternal Grandmother    • No Known Problems Maternal Aunt    • No Known Problems Maternal Aunt    • No Known Problems Paternal Aunt    • No Known Problems Paternal Aunt    • No Known Problems Paternal Aunt    • No Known Problems Paternal Aunt    • Ovarian cancer Neg Hx    • Colon cancer Neg Hx       Social History   Social History     Substance and Sexual Activity   Alcohol Use No     Social History     Substance and Sexual Activity   Drug Use No     Social History     Tobacco Use   Smoking Status Never   Smokeless Tobacco Never       Medications:     Current Outpatient Medications:   •  Cholecalciferol (Vitamin D3) 125 MCG (5000 UT) TABS, Take 1 tablet (5,000 Units total) by mouth daily, Disp: 90 tablet, Rfl: 1  •  estradiol (ESTRACE) 0 1 mg/g vaginal cream, Apply 2-3 times per week, Disp: 42 5 g, Rfl: 1  •  fenofibrate (TRICOR) 145 mg tablet, Take 1 tablet (145 mg total) by mouth daily, Disp: 90 tablet, Rfl: 1  •  Lutein 20 MG CAPS, Take 1 capsule by mouth daily, Disp: , Rfl:   •  meclizine (ANTIVERT) 25 mg tablet, Take 1 tablet (25 mg total) by mouth 3 (three) times a day as needed for dizziness, Disp: 90 tablet, Rfl: 1  •  naproxen (Naprosyn) 500 mg tablet, Take 1 tablet (500 mg total) by mouth 2 (two) times a day as needed for mild pain, Disp: 60 tablet, Rfl: 1  •  omeprazole (PriLOSEC) 20 mg delayed release capsule, Take 1 capsule (20 mg total) by mouth daily, Disp: 90 capsule, Rfl: 1  •  Propylene Glycol 0 6 % SOLN, Apply 1 drop to eye, Disp: , Rfl:   •  valsartan (DIOVAN) 160 mg tablet, Take 1 tablet (160 mg total) by mouth daily, Disp: 90 tablet, Rfl: 1  •  vitamin B-12 (VITAMIN B-12) 1,000 mcg tablet, Take 1 tablet (1,000 mcg total) by mouth daily, Disp: 90 tablet, Rfl: 1  •  zolpidem (AMBIEN) 10 mg tablet, Take 1 tablet (10 mg total) by mouth daily at bedtime, Disp: 30 tablet, Rfl: 2  Allergies   Allergen Reactions   • Doxycycline Hives       OBJECTIVE    Vitals:   Vitals:    01/13/23 1449   BP: 114/62   Pulse: 87   Temp: 99 9 °F (37 7 °C)   SpO2: 94%   Weight: 110 kg (243 lb)   Height: 5' 8" (1 727 m)           Physical Exam  Vitals and nursing note reviewed  Constitutional:       General: She is not in acute distress  Appearance: Normal appearance  HENT:      Head: Normocephalic and atraumatic  Right Ear: Tympanic membrane, ear canal and external ear normal       Left Ear: Tympanic membrane, ear canal and external ear normal       Nose: Nose normal       Mouth/Throat:      Mouth: Mucous membranes are moist       Pharynx: No oropharyngeal exudate or posterior oropharyngeal erythema     Eyes:      Conjunctiva/sclera: Conjunctivae normal    Cardiovascular:      Rate and Rhythm: Normal rate and regular rhythm  Pulmonary:      Effort: Pulmonary effort is normal  No respiratory distress  Breath sounds: Normal breath sounds  Abdominal:      General: Bowel sounds are normal  There is no distension  Palpations: Abdomen is soft  Tenderness: There is no abdominal tenderness  Musculoskeletal:      Right lower leg: No edema  Left lower leg: No edema  Lymphadenopathy:      Cervical: No cervical adenopathy  Skin:     General: Skin is warm and dry  Neurological:      General: No focal deficit present  Mental Status: She is alert     Psychiatric:         Mood and Affect: Mood normal                     DO [address] [facility]   1/13/2023  3:58 PM

## 2023-01-17 ENCOUNTER — TELEPHONE (OUTPATIENT)
Dept: ADMINISTRATIVE | Facility: OTHER | Age: 68
End: 2023-01-17

## 2023-01-17 NOTE — TELEPHONE ENCOUNTER
----- Message from THE [facility],  sent at 1/16/2023 12:57 PM EST -----  01/16/23 12:57 PM    Hello, our patient Mayela White has had CRC: Colonoscopy completed/performed  Please assist in updating the patient chart by making an External outreach to Dr Erma Hart facility located in [place]  The date of service is 10 or 11/2015      Thank you,  THE [facility], DO NEL LANDRUM

## 2023-01-17 NOTE — LETTER
[facility] Medical Records Request for Care Gap Closure    Medical Records Fax: [phone]    Date Requested: 23  Patient: Belkys Fenton  Patient : 1955   Requesting on behalf of Provider: DO Lisa Cardenas DO has requested the retrieval and updating of CRC: Colonoscopy  The office staff has provided us with the following information listed below  Prior to sending this request I have reviewed Epic Chart Review, completed an Epic Chart Search, and reviewed Care Everywhere documents  Estimated Date of Service:   Facility: Latesha Jesus MD  Specialty: Jonelle Dong  Performing Provider:   Additional Comments:      Your assistance in completing this request is greatly appreciated  Please send document to [phone] noah Root: Phone [phone]       Sincerely,      Rikki Oconnor MA

## 2023-01-17 NOTE — TELEPHONE ENCOUNTER
Upon review of the In Basket request and the patient's chart, initial outreach has been made via fax to facility  Please see Contacts section for details       Thank you  Eddie Lorenzo MA

## 2023-01-19 NOTE — TELEPHONE ENCOUNTER
Upon review of the In Basket request we have found as a result of outreach that patient did not have the requested item(s) completed  Any additional questions or concerns should be emailed to the Practice Liaisons via the appropriate education email address, please do not reply via In Basket      Thank you  Adela Wilson MA

## 2023-01-27 ENCOUNTER — APPOINTMENT (OUTPATIENT)
Dept: LAB | Facility: CLINIC | Age: 68
End: 2023-01-27

## 2023-01-27 DIAGNOSIS — R80.9 TYPE 2 DIABETES MELLITUS WITH MICROALBUMINURIA, WITH LONG-TERM CURRENT USE OF INSULIN (HCC): ICD-10-CM

## 2023-01-27 DIAGNOSIS — E55.9 VITAMIN D DEFICIENCY: ICD-10-CM

## 2023-01-27 DIAGNOSIS — E11.29 TYPE 2 DIABETES MELLITUS WITH MICROALBUMINURIA, WITH LONG-TERM CURRENT USE OF INSULIN (HCC): ICD-10-CM

## 2023-01-27 DIAGNOSIS — E53.8 B12 DEFICIENCY: ICD-10-CM

## 2023-01-27 DIAGNOSIS — E78.1 PURE HYPERTRIGLYCERIDEMIA: ICD-10-CM

## 2023-01-27 DIAGNOSIS — Z79.899 LONG TERM USE OF DRUG: ICD-10-CM

## 2023-01-27 DIAGNOSIS — Z79.4 TYPE 2 DIABETES MELLITUS WITH MICROALBUMINURIA, WITH LONG-TERM CURRENT USE OF INSULIN (HCC): ICD-10-CM

## 2023-01-27 DIAGNOSIS — I10 PRIMARY HYPERTENSION: ICD-10-CM

## 2023-01-27 LAB
25(OH)D3 SERPL-MCNC: 24.8 NG/ML (ref 30–100)
ALBUMIN SERPL BCP-MCNC: 3.9 G/DL (ref 3.5–5)
ALP SERPL-CCNC: 56 U/L (ref 46–116)
ALT SERPL W P-5'-P-CCNC: 32 U/L (ref 12–78)
ANION GAP SERPL CALCULATED.3IONS-SCNC: 4 MMOL/L (ref 4–13)
AST SERPL W P-5'-P-CCNC: 29 U/L (ref 5–45)
BASOPHILS # BLD AUTO: 0.1 THOUSANDS/ÂΜL (ref 0–0.1)
BASOPHILS NFR BLD AUTO: 1 % (ref 0–1)
BILIRUB SERPL-MCNC: 0.39 MG/DL (ref 0.2–1)
BUN SERPL-MCNC: 12 MG/DL (ref 5–25)
CALCIUM SERPL-MCNC: 9.4 MG/DL (ref 8.3–10.1)
CHLORIDE SERPL-SCNC: 109 MMOL/L (ref 96–108)
CHOLEST SERPL-MCNC: 146 MG/DL
CO2 SERPL-SCNC: 27 MMOL/L (ref 21–32)
CREAT SERPL-MCNC: 0.76 MG/DL (ref 0.6–1.3)
EOSINOPHIL # BLD AUTO: 0.22 THOUSAND/ÂΜL (ref 0–0.61)
EOSINOPHIL NFR BLD AUTO: 3 % (ref 0–6)
ERYTHROCYTE [DISTWIDTH] IN BLOOD BY AUTOMATED COUNT: 13.7 % (ref 11.6–15.1)
GFR SERPL CREATININE-BSD FRML MDRD: 81 ML/MIN/1.73SQ M
GLUCOSE P FAST SERPL-MCNC: 71 MG/DL (ref 65–99)
HCT VFR BLD AUTO: 40.9 % (ref 34.8–46.1)
HDLC SERPL-MCNC: 31 MG/DL
HGB BLD-MCNC: 12.6 G/DL (ref 11.5–15.4)
IMM GRANULOCYTES # BLD AUTO: 0.02 THOUSAND/UL (ref 0–0.2)
IMM GRANULOCYTES NFR BLD AUTO: 0 % (ref 0–2)
LDLC SERPL CALC-MCNC: 82 MG/DL (ref 0–100)
LYMPHOCYTES # BLD AUTO: 2.18 THOUSANDS/ÂΜL (ref 0.6–4.47)
LYMPHOCYTES NFR BLD AUTO: 30 % (ref 14–44)
MCH RBC QN AUTO: 27.9 PG (ref 26.8–34.3)
MCHC RBC AUTO-ENTMCNC: 30.8 G/DL (ref 31.4–37.4)
MCV RBC AUTO: 91 FL (ref 82–98)
MONOCYTES # BLD AUTO: 0.84 THOUSAND/ÂΜL (ref 0.17–1.22)
MONOCYTES NFR BLD AUTO: 12 % (ref 4–12)
NEUTROPHILS # BLD AUTO: 3.96 THOUSANDS/ÂΜL (ref 1.85–7.62)
NEUTS SEG NFR BLD AUTO: 54 % (ref 43–75)
NONHDLC SERPL-MCNC: 115 MG/DL
NRBC BLD AUTO-RTO: 0 /100 WBCS
PLATELET # BLD AUTO: 323 THOUSANDS/UL (ref 149–390)
PMV BLD AUTO: 10.9 FL (ref 8.9–12.7)
POTASSIUM SERPL-SCNC: 4.3 MMOL/L (ref 3.5–5.3)
PROT SERPL-MCNC: 7.9 G/DL (ref 6.4–8.4)
RBC # BLD AUTO: 4.52 MILLION/UL (ref 3.81–5.12)
SODIUM SERPL-SCNC: 140 MMOL/L (ref 135–147)
TRIGL SERPL-MCNC: 167 MG/DL
TSH SERPL DL<=0.05 MIU/L-ACNC: 2.61 UIU/ML (ref 0.45–4.5)
VIT B12 SERPL-MCNC: 741 PG/ML (ref 100–900)
WBC # BLD AUTO: 7.32 THOUSAND/UL (ref 4.31–10.16)

## 2023-02-06 DIAGNOSIS — E53.8 B12 DEFICIENCY: ICD-10-CM

## 2023-02-06 RX ORDER — LANOLIN ALCOHOL/MO/W.PET/CERES
CREAM (GRAM) TOPICAL
Qty: 90 TABLET | Refills: 3 | Status: SHIPPED | OUTPATIENT
Start: 2023-02-06

## 2023-03-14 NOTE — ANESTHESIA POSTPROCEDURE EVALUATION
Post-Op Assessment Note      CV Status:  Stable    Mental Status:  Awake    Hydration Status:  Euvolemic    PONV Controlled:  Controlled    Airway Patency:  Patent    Post Op Vitals Reviewed: Yes          Staff: Anesthesiologist, CRNA           BP   142/67   Temp   97 7   Pulse  63   Resp   12   SpO2   100 No

## 2023-04-04 DIAGNOSIS — F51.01 PRIMARY INSOMNIA: ICD-10-CM

## 2023-04-04 RX ORDER — ZOLPIDEM TARTRATE 10 MG/1
10 TABLET ORAL
Qty: 90 TABLET | Refills: 0 | Status: SHIPPED | OUTPATIENT
Start: 2023-04-04

## 2023-04-04 NOTE — TELEPHONE ENCOUNTER
Medication Refill Request     Name zolpidem (AMBIEN)  Dose/Frequency  10 mg tablet Take 1 tablet (10 mg total) by mouth daily at bedtime       Quantity 30  Verified pharmacy   [x]  Verified ordering Provider   [x]  Does patient have enough for the next 3 days?  Yes [x] No []

## 2023-04-04 NOTE — TELEPHONE ENCOUNTER
Pt  Would like to know if she could get a 90 day supply instead of 30  She goes thru a mail order and they usually are a 90 day supply and she used to get a 90 day supply of this medication

## 2023-05-21 DIAGNOSIS — Z12.11 SCREEN FOR COLON CANCER: Primary | ICD-10-CM

## 2023-06-27 ENCOUNTER — APPOINTMENT (OUTPATIENT)
Dept: RADIOLOGY | Facility: CLINIC | Age: 68
End: 2023-06-27
Payer: COMMERCIAL

## 2023-06-27 DIAGNOSIS — S92.331S: ICD-10-CM

## 2023-06-27 DIAGNOSIS — S92.341S OPEN DISPLACED FRACTURE OF FOURTH METATARSAL BONE OF RIGHT FOOT, SEQUELA: ICD-10-CM

## 2023-06-27 DIAGNOSIS — S92.324S CLOSED NONDISPLACED FRACTURE OF SECOND METATARSAL BONE OF RIGHT FOOT, SEQUELA: ICD-10-CM

## 2023-06-27 PROCEDURE — 73630 X-RAY EXAM OF FOOT: CPT

## 2023-06-30 DIAGNOSIS — F51.01 PRIMARY INSOMNIA: ICD-10-CM

## 2023-06-30 RX ORDER — ZOLPIDEM TARTRATE 10 MG/1
10 TABLET ORAL
Qty: 90 TABLET | Refills: 0 | Status: SHIPPED | OUTPATIENT
Start: 2023-06-30

## 2023-08-04 DIAGNOSIS — K21.9 GASTROESOPHAGEAL REFLUX DISEASE, UNSPECIFIED WHETHER ESOPHAGITIS PRESENT: ICD-10-CM

## 2023-08-04 DIAGNOSIS — E78.1 PURE HYPERTRIGLYCERIDEMIA: ICD-10-CM

## 2023-08-04 RX ORDER — OMEPRAZOLE 20 MG/1
20 CAPSULE, DELAYED RELEASE ORAL DAILY
Qty: 90 CAPSULE | Refills: 3 | Status: SHIPPED | OUTPATIENT
Start: 2023-08-04

## 2023-08-04 RX ORDER — FENOFIBRATE 145 MG/1
145 TABLET, COATED ORAL DAILY
Qty: 90 TABLET | Refills: 3 | Status: SHIPPED | OUTPATIENT
Start: 2023-08-04

## 2023-08-14 DIAGNOSIS — I10 PRIMARY HYPERTENSION: ICD-10-CM

## 2023-08-14 RX ORDER — VALSARTAN 160 MG/1
160 TABLET ORAL DAILY
Qty: 90 TABLET | Refills: 1 | Status: SHIPPED | OUTPATIENT
Start: 2023-08-14

## 2023-09-21 ENCOUNTER — TELEPHONE (OUTPATIENT)
Dept: FAMILY MEDICINE CLINIC | Facility: CLINIC | Age: 68
End: 2023-09-21

## 2023-09-28 ENCOUNTER — CONSULT (OUTPATIENT)
Age: 68
End: 2023-09-28
Payer: COMMERCIAL

## 2023-09-28 VITALS
DIASTOLIC BLOOD PRESSURE: 84 MMHG | WEIGHT: 250 LBS | SYSTOLIC BLOOD PRESSURE: 120 MMHG | BODY MASS INDEX: 37.89 KG/M2 | HEIGHT: 68 IN

## 2023-09-28 DIAGNOSIS — M48.02 CERVICAL SPINAL STENOSIS: ICD-10-CM

## 2023-09-28 DIAGNOSIS — M75.102 TEAR OF LEFT ROTATOR CUFF, UNSPECIFIED TEAR EXTENT, UNSPECIFIED WHETHER TRAUMATIC: ICD-10-CM

## 2023-09-28 DIAGNOSIS — M50.223 HERNIATION OF INTERVERTEBRAL DISC AT C6-C7 LEVEL: ICD-10-CM

## 2023-09-28 DIAGNOSIS — M50.120 CERVICAL DISC DISORDER WITH RADICULOPATHY OF MID-CERVICAL REGION: Primary | ICD-10-CM

## 2023-09-28 DIAGNOSIS — M54.2 CHRONIC NECK PAIN: ICD-10-CM

## 2023-09-28 DIAGNOSIS — G89.29 CHRONIC NECK PAIN: ICD-10-CM

## 2023-09-28 PROCEDURE — 99204 OFFICE O/P NEW MOD 45 MIN: CPT | Performed by: ANESTHESIOLOGY

## 2023-09-28 NOTE — PROGRESS NOTES
Assessment:  1. Cervical disc disorder with radiculopathy of mid-cervical region    2. Chronic neck pain    3. Herniation of intervertebral disc at C6-C7 level    4. Cervical spinal stenosis    5. Tear of left rotator cuff, unspecified tear extent, unspecified whether traumatic        Plan:  Patient is a 80-year-old female complains of neck pain, bilateral shoulder pain with chronic pain syndrome secondary to cervical degenerative disease, cervical radiculopathy, cervical spondylosis presents to office for initial consultation. MRI cervical spine from 2019 shows multilevel diffuse disc bulge with a C6-C7 central disc herniation protrusion type causing mild central canal narrowing. Patient also has a history of a left rotator cuff tear status post repair and retear. 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 1.  We will provide patient with physical therapy cervical spine strengthening  2. We will obtain a new MRI after patient completed physical therapy    Complete risks and benefits including bleeding, infection, tissue reaction, nerve injury and allergic reaction were discussed. The approach was demonstrated using models and literature was provided. Verbal and written consent was obtained. History of Present Illness: The patient is a 76 y.o. female who presents for consultation in regards to Neck Pain. Symptoms have been present for several years. Symptoms began without any precipitating injury or trauma. Pain is reported to be 7 on the numeric rating scale. Symptoms are felt nearly constantly and worst in the no typical pattern. Symptoms are characterized as pressure-like and throbbing. Symptoms are associated with bilateral arm weakness. Aggravating factors include lying down and sitting. Relieving factors include nothing.   No change in symptoms with kneeling, standing, bending, leaning forward, leaning bckward, walking, exercise, turning the head, relaxation, coughing/sneezing and bowel movements. Treatments that have been helpful include prior injections including MICHELLE and TENS unit. physical therapy and home exercise have provided no relief. Medications to relieve symptoms include none. Review of Systems:    Review of Systems   Constitutional: Negative for chills, fatigue and fever. HENT: Negative for hearing loss, sinus pain, sore throat and trouble swallowing. Eyes: Positive for pain. Negative for visual disturbance. Respiratory: Negative for shortness of breath and wheezing. Cardiovascular: Negative for chest pain and palpitations. Gastrointestinal: Negative for abdominal pain, constipation and nausea. Endocrine: Negative for polydipsia and polyuria. Genitourinary: Negative for difficulty urinating. Musculoskeletal: Negative for arthralgias, gait problem, joint swelling and myalgias. Skin: Negative for rash. Neurological: Positive for dizziness. Negative for weakness and headaches. Hematological: Does not bruise/bleed easily. Psychiatric/Behavioral: Negative for dysphoric mood. The patient is not nervous/anxious. All other systems reviewed and are negative.         Past Medical History:   Diagnosis Date   • Bacterial vaginitis 10/8/2018   • Complex atypical endometrial hyperplasia 1/23/2018   • CPAP (continuous positive airway pressure) dependence     does not use machine   • DJD (degenerative joint disease)    • Dyspareunia 6/16/2015   • Gait abnormality    • GERD (gastroesophageal reflux disease)    • History of transfusion    • Hyperlipidemia    • Obesity (BMI 35.0-39.9 without comorbidity)    • Sleep apnea    • Uterine polyp 12/1/2017       Past Surgical History:   Procedure Laterality Date   • BLADDER SUSPENSION     • CHOLECYSTECTOMY     • COLONOSCOPY     • CYSTOSCOPY N/A 01/23/2018    Procedure: CYSTOSCOPY;  Surgeon: Maddy Gonzales MD; [facility];  Service: Gynecology Oncology   • EYE SURGERY     • HYSTERECTOMY     • JOINT REPLACEMENT Bilateral    • NJ HYSTEROSCOPY BX ENDOMETRIUM&/POLYPC W/WO D&C N/A 12/01/2017    Procedure: DILATATION AND CURETTAGE (D&C) WITH HYSTEROSCOPY; POLYPECTOMY;  Surgeon: Srini Lanier MD;  [facility];  Service: Gynecology   • NJ LAPS TOTAL HYSTERECT 250 GM/< W/RMVL TUBE/OVARY N/A 01/23/2018    Procedure: ROBOTIC ASSISTED TOTAL LAPAROSCOPIC HYSTERECTOMY; BILATERAL SALPINGOOPHERECTOMY;  Surgeon: Hermelindo Gaming MD;  [facility];  Service: Gynecology Oncology   • REDUCTION MAMMAPLASTY Bilateral 18yrs ago   • REPLACEMENT TOTAL KNEE BILATERAL     • ROTATOR CUFF REPAIR Left    • TUBAL LIGATION         Family History   Problem Relation Age of Onset   • No Known Problems Mother    • Emphysema Father    • Bone cancer Sister    • Lung cancer Sister    • Brain cancer Sister    • No Known Problems Sister    • No Known Problems Sister    • No Known Problems Sister    • No Known Problems Daughter    • Breast cancer Maternal Grandmother    • No Known Problems Maternal Aunt    • No Known Problems Maternal Aunt    • No Known Problems Paternal Aunt    • No Known Problems Paternal Aunt    • No Known Problems Paternal Aunt    • No Known Problems Paternal Aunt    • Ovarian cancer Neg Hx    • Colon cancer Neg Hx        Social History     Occupational History   • Not on file   Tobacco Use   • Smoking status: Never   • Smokeless tobacco: Never   Vaping Use   • Vaping Use: Never used   Substance and Sexual Activity   • Alcohol use: No   • Drug use: No   • Sexual activity: Yes     Partners: Male     Comment: with          Current Outpatient Medications:   •  Cholecalciferol (Vitamin D3) 125 MCG (5000 UT) TABS, Take 1 tablet (5,000 Units total) by mouth daily, Disp: 90 tablet, Rfl: 1  •  estradiol (ESTRACE) 0.1 mg/g vaginal cream, Apply 2-3 times per week, Disp: 42.5 g, Rfl: 1  •  fenofibrate (TRICOR) 145 mg tablet, TAKE 1 TABLET DAILY, Disp: 90 tablet, Rfl: 3  •  Lutein 20 MG CAPS, Take 1 capsule by mouth daily, Disp: , Rfl:   •  meclizine (ANTIVERT) 25 mg tablet, Take 1 tablet (25 mg total) by mouth 3 (three) times a day as needed for dizziness, Disp: 90 tablet, Rfl: 1  •  naproxen (NAPROSYN) 500 mg tablet, TAKE 1 TABLET TWICE A DAY AS NEEDED FOR MILD PAIN, Disp: 60 tablet, Rfl: 11  •  omeprazole (PriLOSEC) 20 mg delayed release capsule, TAKE 1 CAPSULE DAILY, Disp: 90 capsule, Rfl: 3  •  Propylene Glycol 0.6 % SOLN, Apply 1 drop to eye, Disp: , Rfl:   •  valsartan (DIOVAN) 160 mg tablet, TAKE 1 TABLET DAILY, Disp: 90 tablet, Rfl: 1  •  vitamin B-12 (VITAMIN B-12) 1,000 mcg tablet, TAKE 1 TABLET DAILY, Disp: 90 tablet, Rfl: 3  •  zolpidem (AMBIEN) 10 mg tablet, Take 1 tablet (10 mg total) by mouth daily at bedtime, Disp: 90 tablet, Rfl: 0    Allergies   Allergen Reactions   • Doxycycline Hives       Physical Exam:    Ht 5' 8" (1.727 m)   LMP  (LMP Unknown)   BMI 36.95 kg/m²     Constitutional: normal, well developed, well nourished, alert, in no distress and non-toxic and no overt pain behavior. and obese  Eyes: anicteric  HEENT: grossly intact  Neck: supple, symmetric, trachea midline and no masses   Pulmonary:even and unlabored  Cardiovascular:No edema or pitting edema present  Skin:Normal without rashes or lesions and well hydrated  Psychiatric:Mood and affect appropriate  Neurologic:Cranial Nerves II-XII grossly intact  Musculoskeletal:antalgic     Cervical Spine examination demonstrates. Decreased ROM secondary to pain with lateral rotation to the left/right and bending to the left/right, in addition to neck flexion. 5/5 upper extremity strength in all muscle groups bilaterally. Negative Spurling's maneuver to the b/l Ue, sensitivity to light touch intact b/l Ue. Imaging  No orders to display       No orders of the defined types were placed in this encounter.

## 2023-09-28 NOTE — PATIENT INSTRUCTIONS
Neck Exercises   WHAT YOU NEED TO KNOW:   What do I need to know about neck exercises? Neck exercises help reduce neck pain, and improve neck movement and strength. Neck exercises also help prevent long-term neck problems. What do I need to know about neck exercise safety? Move slowly, gently, and smoothly. Avoid fast or jerky motions. Stand and sit the way your healthcare provider shows you. Good posture may reduce your neck pain. Check your posture often, even when you are not doing your neck exercises. Follow the exercise program recommended by your healthcare provider. He or she will tell you which exercises are best for your condition. He or she will also tell you how many repetitions to do and how often you should do the exercises. How do I perform neck exercises safely? Exercise position:  You may sit or stand while you do neck exercises. Face forward. Your shoulders should be straight and relaxed, with a good posture. Head tilts, forward and back:  Gently bow your head and try to touch your chin to your chest. Your healthcare provider may tell you to push on the back of your neck to help bow your head. Raise your chin back to the starting position. Tilt your head back as far as possible so you are looking up at the ceiling. Your healthcare provider may tell you to lift your chin to help tilt your head back. Return your head to the starting position. Head tilts, side to side:  Tilt your head, bringing your ear toward your shoulder. Then tilt your head toward the other shoulder. Head turns:  Turn your head to look over your shoulder. Tilt your chin down and try to touch it to your shoulder. Do not raise your shoulder to your chin. Face forward again. Do the same on the other side. Head rolls:  Slowly bring your chin toward your chest. Next, roll your head to the right. Your ear should be positioned over your shoulder. Hold this position for 5 seconds.  Roll your head back toward your chest and to the left into the same position. Hold for 5 seconds. Gently roll your head back and around in a clockwise [place] 3 times. Next, move your head in the reverse direction (counterclockwise) in a [place] 3 times. Do not shrug your shoulders upwards while you do this exercise. When should I call my doctor? Your pain does not get better, or gets worse. You have questions or concerns about your condition, care, or exercise program.    CARE AGREEMENT:   You have the right to help plan your care. Learn about your health condition and how it may be treated. Discuss treatment options with your healthcare providers to decide what care you want to receive. You always have the right to refuse treatment. The above information is an  only. It is not intended as medical advice for individual conditions or treatments. Talk to your doctor, nurse or pharmacist before following any medical regimen to see if it is safe and effective for you. © Copyright Thana [employer]iv[employer] 2023 Information is for End User's use only and may not be sold, redistributed or otherwise used for commercial purposes.

## 2023-10-04 ENCOUNTER — EVALUATION (OUTPATIENT)
Dept: PHYSICAL THERAPY | Facility: CLINIC | Age: 68
End: 2023-10-04
Payer: COMMERCIAL

## 2023-10-04 DIAGNOSIS — M54.2 CHRONIC NECK PAIN: Primary | ICD-10-CM

## 2023-10-04 DIAGNOSIS — G89.29 CHRONIC NECK PAIN: Primary | ICD-10-CM

## 2023-10-04 DIAGNOSIS — M50.120 CERVICAL DISC DISORDER WITH RADICULOPATHY OF MID-CERVICAL REGION: ICD-10-CM

## 2023-10-04 DIAGNOSIS — M75.102 TEAR OF LEFT ROTATOR CUFF, UNSPECIFIED TEAR EXTENT, UNSPECIFIED WHETHER TRAUMATIC: ICD-10-CM

## 2023-10-04 DIAGNOSIS — M48.02 CERVICAL SPINAL STENOSIS: ICD-10-CM

## 2023-10-04 DIAGNOSIS — M50.223 HERNIATION OF INTERVERTEBRAL DISC AT C6-C7 LEVEL: ICD-10-CM

## 2023-10-04 PROCEDURE — 97162 PT EVAL MOD COMPLEX 30 MIN: CPT

## 2023-10-04 PROCEDURE — 97110 THERAPEUTIC EXERCISES: CPT

## 2023-10-04 NOTE — PROGRESS NOTES
Bud Agustin was discharged from skilled physical therapy services at the [address] [place] [address] location. She will be receiving services at the [place] location as this is closer to her home.

## 2023-10-04 NOTE — PROGRESS NOTES
PT Evaluation     Today's date: 10/4/2023  Patient name: Lizbeth Talavera  : 1955  MRN: [id number]  Referring provider: Lisa Cervantes MD  Dx:   Encounter Diagnosis     ICD-10-CM    1. Chronic neck pain  M54.2 Ambulatory referral to Physical Therapy    G89.29       2. Cervical disc disorder with radiculopathy of mid-cervical region  M50.120 Ambulatory referral to Physical Therapy      3. Herniation of intervertebral disc at C6-C7 level  M50.223 Ambulatory referral to Physical Therapy      4. Cervical spinal stenosis  M48.02 Ambulatory referral to Physical Therapy      5. Tear of left rotator cuff, unspecified tear extent, unspecified whether traumatic  M75.102 Ambulatory referral to Physical Therapy          Start Time: 3834  Stop Time: 1350  Total time in clinic (min): 47 minutes    Assessment  Assessment details: Melony Larry is a 75-year-old female who presented to outpatient physical therapy services for cervical radiculopathy and left shoulder rotator cuff pathology. She demonstrated limited cervical EXT and B/L SB, and MMT of the cervical region exhibited weakness and elicited symptoms. Her B/L shoulder AROM was limited with FLX and ABD, and TTP was present throughout the cervical and thoracic spines. Nabil Mayorga had (+) findings in special testing for the shoulders that indicated shoulder impingement and pathology of the RTC. Due to these impairments, Nabil Mayorga has difficulty performing her usual household activities and caregiver responsibilities with her elderly mother. These findings were discussed in detail with Nabil Mayorga, and she verbalized understanding. She would benefit from skilled physical therapy services 2 times/week for 6 weeks to address impairments in cervical strength and stability, B/L shoulder AROM, and activity tolerance. Nabil Mayorga reviewed and performed the exercises included in her HEP to provide concurrent feedback regarding proper positioning and their purpose within the POC.  Time was allotted for questions and concerns, and these were answered to Rosemarie's satisfaction. Thank you for your referral.   Impairments: abnormal or restricted ROM, abnormal movement, activity intolerance, impaired physical strength, lacks appropriate home exercise program, pain with function, scapular dyskinesis, poor posture  and poor body mechanics    Symptom irritability: moderateUnderstanding of Dx/Px/POC: good   Prognosis: good    Goals  Short-Term Goals (1-4 Weeks)  1. Duke Marking will have reduced TTP of the C5-C7 SPs.  2. Duke Marking will exhibit a 4/5 MMT for R shoulder FLX. 3. Rosemarie will achieve 25 degrees of cervical EXT AROM. Long-Term Goals (5-10 Weeks)  1. Duke Marking will exhibit "fair" posture to indicate improved scapular strength and cervical stability. [address] will lift a 2 lbs dumbbell onto an OH shelf for 5 repetitions with proper body mechanics to simulate putting the dishes into her kitchen cabinets. 3. Duke Marking will report an overall symptom improvement of at least 40% to demonstrate greater quality of life. 4. Rosemarie will achieve a FOTO score of 58 prior to discharge. Plan  Plan details: Duke Marking reviewed and agreed with the POC.    Patient would benefit from: skilled speech therapy  Planned modality interventions: TENS  Planned therapy interventions: abdominal trunk stabilization, ADL retraining, body mechanics training, breathing training, coordination, flexibility, functional ROM exercises, gait training, graded activity, graded exercise, home exercise program, IADL retraining, therapeutic training, therapeutic exercise, therapeutic activities, stretching, strengthening, postural training, patient education, neuromuscular re-education, nerve gliding, manual therapy, kinesiology taping, joint mobilization, IASTM and massage  Frequency: 2x week  Duration in visits: 12  Duration in weeks: 6  Plan of Care beginning date: 10/4/2023  Plan of Care expiration date: 11/17/2023        Subjective Evaluation    History of Present Illness  Mechanism of injury: Rosemarie reported that she has experienced neck pain for many years. She has difficulty looking up sometimes, and turning her head elicits pain, which makes it difficult to drive her car. Due to her neck pain, falling asleep can be challenging for her due. Nasrin Phillips occasionally takes prescription Tramadol for her symptoms, which does help, but she only takes this once every two weeks per her report. She previously received injections into her neck, and she had the nerves in the right side of her neck "burned." The doctor who previously did this procedure is no longer practicing, but this was one of the few things that helped her pain. Nasrin Phillips went to physical therapy services in the past, and she stated that traction reduced her symptoms. Additionally, Nasrin Phillips explained that she injured her left shoulder several years ago, and she underwent a surgery as there is an "anchor" in her left shoulder that frequently bothers her. Her right shoulder has been hurting nearly as much as her left shoulder. Nasrin Phillips is the primary caregiver for her 43-year-old mother, and she assists with stair negotiation and transfers. Due to these impairments, Rosemarie experiences significant increases in pain with her usual house chores and caregiver tasks. Through skilled physical therapy, Nasrin Phillips would like her neck to stop hurting.            Recurrent probem    Quality of life: good    Patient Goals  Patient goals for therapy: decreased pain, increased motion, increased strength, independence with ADLs/IADLs and return to sport/leisure activities    Pain  Current pain ratin  At best pain ratin  At worst pain ratin  Quality: dull ache  Relieving factors: medications and rest  Aggravating factors: overhead activity (sleeping and assisting her mother)    Social Support    Employment status: not working  Hand dominance: right      Diagnostic Tests  X-ray: abnormal  Treatments  Previous treatment: physical therapy, medication and injection treatment  Current treatment: medication and physical therapy        Objective     Static Posture     Head  Forward. Shoulders  Elevated and rounded. Scapulae  Left upwardly rotated, right upwardly rotated, left elevated, right elevated, left protracted and right protracted. Thoracic Spine  Hyperkyphosis. Rib Cage  Elevated. Palpation   Left   No palpable tenderness to the biceps and rhomboids. Hypertonic in the levator scapulae and upper trapezius. Tenderness of the levator scapulae, pectoralis major, pectoralis minor, supraspinatus, teres major, teres minor, thoracic paraspinals and upper trapezius. Right   No palpable tenderness to the biceps and rhomboids. Hypertonic in the levator scapulae and upper trapezius. Tenderness of the levator scapulae, pectoralis major, pectoralis minor, supraspinatus, teres major, teres minor, thoracic paraspinals and upper trapezius. Tenderness   Cervical Spine   Tenderness in the spinous process. No tenderness in the left scapula, left ribs/costal cartilage, right scapula and right  ribs . Left Shoulder   Tenderness in the [facility] joint, clavicle, coracoid process and supraspinatus tendon. No tenderness in the bicipital groove, lateral scapula, medial scapula and scapular spine. Right Shoulder  Tenderness in the bicipital groove, clavicle, coracoid process and supraspinatus tendon. No tenderness in the [facility] joint, lateral scapula, medial scapula and scapular spine.      Additional Tenderness Details  B/L 1st ribs    Active Range of Motion   Cervical/Thoracic Spine       Cervical    Flexion: 46 degrees   Extension: 18 degrees      Left lateral flexion: 15 degrees      Right lateral flexion: 25 degrees      Left rotation: 50 degrees  Right rotation: 51 degrees         Left Shoulder   Flexion: 105 degrees   Extension: 51 degrees   Abduction: 82 degrees     Right Shoulder   Flexion: 131 (pain while descending) degrees   Extension: 54 degrees   Abduction: 121 degrees     Left Elbow   Flexion: 146 degrees   Extension: -11 degrees     Right Elbow   Flexion: 149 degrees   Extension: -12 degrees     Joint Play     Pain: C1, C2, C3, C4, C5, C6, C7, T1, T2, T3, T4, T5, T6, T7, T8, T9 and 1st rib     Strength/Myotome Testing   Cervical Spine   Neck extension: 2+  Neck flexion: 3+ (painful!)    Left   Neck lateral flexion (C3): 4-    Right   Neck lateral flexion (C3): 4- (painful!)    Left Shoulder     Planes of Motion   Flexion: 3-   Abduction: 2   External rotation at 0°: 4- (discomfort)   Internal rotation at 0°: 4-     Right Shoulder     Planes of Motion   Flexion: 4-   Abduction: 3-   External rotation at 0°: 4   Internal rotation at 0°: 4     Left Elbow   Flexion: 4  Extension: 4    Right Elbow   Flexion: 4-  Extension: 4-    Left Wrist/Hand      (2nd hand position)     Trial 1: 50    Trial 2: 47    Trial 3: 55    Average: 50.67    Right Wrist/Hand      (2nd hand position)     Trial 1: 47    Trial 2: 53    Trial 3: 53    Average: 51    Tests     Left Shoulder   Positive Hawkin's, horn blower and Lencho's. Negative drop arm, external rotation lag sign, internal rotation lag sign, lift-off and painful arc. Right Shoulder   Positive belly press, drop arm, Hawkin's, horn blower, lift-off, painful arc and Lencho's. Negative external rotation lag sign and internal rotation lag sign.        Flowsheet Rows    Flowsheet Row Most Recent Value   PT/OT G-Codes    Current Score 57   Projected Score 58             Precautions: Hx of GERD, HTN, T2DM, and radiculopathy      Manuals 10/4        SOR and MFR of Cervical Paraspinals         Shoulder PROM                  Neuro Re-Ed         Cervical RET         Scapular RET 15x        Scapular DEP and RET         Figure 8s                  Ther Ex         Pulleys (FLX/ABD) (AROM)         UBE (Strength)         UT Stretch 3x20"        LS Stretch 3x20"        Doorway Pec Major Stretch         TheraBand Shoulder EXT         TheraBand Rows         NecksLevel Cervical ROT         NecksLevel Cervical EXT         NecksLevel Cervical   SB         Bicep Curls         Hammer Curls                  Ther Activity         Card Activity         Overhead Cone Stacking         Crate Lifts                  Modalities

## 2023-10-06 DIAGNOSIS — N95.2 ATROPHIC VAGINITIS: ICD-10-CM

## 2023-10-06 RX ORDER — ESTRADIOL 0.1 MG/G
CREAM VAGINAL
Qty: 42.5 G | Refills: 2 | Status: SHIPPED | OUTPATIENT
Start: 2023-10-06

## 2023-10-11 ENCOUNTER — APPOINTMENT (OUTPATIENT)
Dept: PHYSICAL THERAPY | Facility: CLINIC | Age: 68
End: 2023-10-11
Payer: COMMERCIAL

## 2023-10-13 ENCOUNTER — APPOINTMENT (OUTPATIENT)
Dept: PHYSICAL THERAPY | Facility: CLINIC | Age: 68
End: 2023-10-13
Payer: COMMERCIAL

## 2023-10-17 ENCOUNTER — EVALUATION (OUTPATIENT)
Dept: PHYSICAL THERAPY | Age: 68
End: 2023-10-17
Payer: COMMERCIAL

## 2023-10-17 DIAGNOSIS — G89.29 CHRONIC NECK PAIN: Primary | ICD-10-CM

## 2023-10-17 DIAGNOSIS — M54.2 CHRONIC NECK PAIN: Primary | ICD-10-CM

## 2023-10-17 DIAGNOSIS — M75.102 TEAR OF LEFT ROTATOR CUFF, UNSPECIFIED TEAR EXTENT, UNSPECIFIED WHETHER TRAUMATIC: ICD-10-CM

## 2023-10-17 DIAGNOSIS — M50.120 CERVICAL DISC DISORDER WITH RADICULOPATHY OF MID-CERVICAL REGION: ICD-10-CM

## 2023-10-17 PROCEDURE — 97164 PT RE-EVAL EST PLAN CARE: CPT | Performed by: PHYSICAL THERAPIST

## 2023-10-17 PROCEDURE — 97110 THERAPEUTIC EXERCISES: CPT | Performed by: PHYSICAL THERAPIST

## 2023-10-17 PROCEDURE — 97140 MANUAL THERAPY 1/> REGIONS: CPT | Performed by: PHYSICAL THERAPIST

## 2023-10-17 NOTE — PROGRESS NOTES
PT Re-Evaluation     Today's date: 10/17/2023  Patient name: Krystle Diaz  : 1955  MRN: [id number]  Referring provider: Laurie Mora MD  Dx:   Encounter Diagnosis     ICD-10-CM    1. Chronic neck pain  M54.2     G89.29       2. Cervical disc disorder with radiculopathy of mid-cervical region  M50.120       3. Tear of left rotator cuff, unspecified tear extent, unspecified whether traumatic  M75.102           Start Time: 1450  Stop Time: 1551  Total time in clinic (min): 61 minutes    Assessment  Assessment details: Joseluis Alvarez is a 71-year-old female who presented to outpatient physical therapy services for cervical radiculopathy and left shoulder rotator cuff pathology. She demonstrated limited cervical ROM, and MMT of the cervical region exhibited weakness and elicited symptoms. Her B/L shoulder AROM was limited with FLX and ABD, and TTP was present throughout the cervical and thoracic spines. Reece Eisenmenger had (+) findings in special testing for the shoulders that indicated shoulder impingement and pathology of the RTC. Due to these impairments, Reece Eisenmenger has difficulty performing her usual household activities and caregiver responsibilities with her elderly mother. These findings were discussed in detail with Reece Eisenmenger, and she verbalized understanding. She would benefit from skilled physical therapy services 2 times/week for 6 weeks to address impairments in cervical strength and stability, B/L shoulder AROM, and activity tolerance. Reece Eisenmenger reviewed and performed the exercises included in her HEP to provide concurrent feedback regarding proper positioning and their purpose within the POC. Impairments: abnormal or restricted ROM, activity intolerance, impaired physical strength, lacks appropriate home exercise program, pain with function, poor posture  and poor body mechanics    Symptom irritability: moderateUnderstanding of Dx/Px/POC: good   Prognosis: good    Goals  Short-Term Goals (1-4 Weeks)  1. Amairani Paris will have reduced TTP of the C5-C7 SPs.  2. Amairani Paris will exhibit a 4/5 MMT for R shoulder FLX. 3. Rosemarie will achieve >5 degrees of cervical AROM. Long-Term Goals (5-10 Weeks)  1. Amairani Paris will exhibit "fair" posture to indicate improved scapular strength and cervical stability. 1 [address]a will lift a 2 lbs dumbbell onto an OH shelf for 5 repetitions with proper body mechanics to simulate putting the dishes into her kitchen cabinets. 3. Amairani Paris will report an overall symptom improvement of at least 40% to demonstrate greater quality of life. [address] will achieve a FOTO score of > 58 prior to discharge. 5. Patient to be independent with a/iadls. 6. Increase functional activities for leisure and home activities to previous LOF. 7. Independent with HEP and/or fitness program.    Plan  Plan details: Amairani Paris reviewed and agreed with the POC. Planned modality interventions: low level laser therapy and traction  Planned therapy interventions: ADL retraining, body mechanics training, flexibility, functional ROM exercises, home exercise program, therapeutic training, therapeutic exercise, therapeutic activities, stretching, strengthening, postural training, patient education, neuromuscular re-education, nerve gliding, manual therapy, joint mobilization, IASTM, massage, kinesiology taping and abdominal trunk stabilization  Frequency: 2x week  Duration in weeks: 12  Plan of Care beginning date: 10/17/2023  Plan of Care expiration date: 1/17/2024  Treatment plan discussed with: patient        Subjective Evaluation    History of Present Illness  Mechanism of injury: Amairani Paris reported that she has experienced neck pain for many years. She has difficulty looking up sometimes, and turning her head elicits pain, which makes it difficult to drive her car. Due to her neck pain, falling asleep can be challenging for her due.  Amairani Paris occasionally takes prescription Tramadol for her symptoms, which does help, but she only takes this once every two weeks per her report. She previously received injections into her neck, and she had the nerves in the right side of her neck "burned." The doctor who previously did this procedure is no longer practicing, but this was one of the few things that helped her pain. Melodie Padron went to physical therapy services in the past, and she stated that traction reduced her symptoms. Additionally, Melodie Padron explained that she injured her left shoulder several years ago, and she underwent a surgery as there is an "anchor" in her left shoulder that frequently bothers her. Her right shoulder has been hurting nearly as much as her left shoulder. Melodie Padron is the primary caregiver for her 59-year-old mother, and she assists with stair negotiation and transfers. Due to these impairments, Rosemarie experiences significant increases in pain with her usual house chores and caregiver tasks. Through skilled physical therapy, Melodie Padron would like her neck to stop hurting. 10/17/23: Patient transferred to this facility due to location better for her. MD referred her for next level exercise machine. Recurrent probem    Quality of life: good    Patient Goals  Patient goals for therapy: decreased pain, increased motion, increased strength, independence with ADLs/IADLs and return to sport/leisure activities  Patient goal: able to turn neck to drive, relax to sleep. Pain  Current pain ratin  At best pain ratin  At worst pain ratin  Location: neck  Quality: dull ache and discomfort  Relieving factors: medications and rest  Aggravating factors: overhead activity (sleeping and assisting her mother)  Progression: no change    Social Support  Lives with: parents and spouse    Employment status: not working  Hand dominance: right    Treatments  Previous treatment: physical therapy, medication and injection treatment        Objective     Static Posture     Head  Forward. Shoulders  Elevated and rounded.     Thoracic Spine  Hyperkyphosis. Postural Observations  Seated posture: fair  Standing posture: fair      Palpation   Left   No palpable tenderness to the biceps. Hypertonic in the levator scapulae and upper trapezius. Tenderness of the levator scapulae, supraspinatus, teres major, teres minor and upper trapezius. Right   No palpable tenderness to the biceps. Hypertonic in the levator scapulae and upper trapezius. Tenderness of the levator scapulae, supraspinatus, teres major, teres minor and upper trapezius. Tenderness   Cervical Spine   Tenderness in the spinous process. Left Shoulder   Tenderness in the [facility] joint and supraspinatus tendon. No tenderness in the biceps tendon (proximal) and bicipital groove. Right Shoulder  Tenderness in the supraspinatus tendon. No tenderness in the bicipital groove and coracoid process.      Neurological Testing     Sensation   Cervical/Thoracic   Left   Intact: light touch    Right   Intact: light touch    Additional Neurological Details  No radicular symptoms noted    Active Range of Motion   Cervical/Thoracic Spine       Cervical    Flexion: 42 degrees   Extension: 35 degrees      Left lateral flexion: 18 degrees      Right lateral flexion: 26 degrees      Left rotation: 38 degrees  Right rotation: 53 degrees     Left Shoulder   Flexion: 120 degrees   Abduction: 110 degrees   Internal rotation BTB: WFL    Right Shoulder   Flexion: 130 (pain while descending) degrees   Abduction: 110 degrees   Internal rotation BTB: WFL    Left Elbow   Flexion: 146 degrees   Extension: -11 degrees     Right Elbow   Flexion: 149 degrees   Extension: -12 degrees     Passive Range of Motion   Left Shoulder   Flexion: 130 (guarded) degrees   Abduction: 115 (guarded) degrees   External rotation 90°: WFL  Internal rotation 90°: WFL    Right Shoulder   External rotation 90°: WFL  Internal rotation 90°: WFL    Strength/Myotome Testing   Cervical Spine   Neck extension: 3-  Neck flexion: 4- (painful!)    Left   Neck lateral flexion (C3): 3+    Right   Neck lateral flexion (C3): 3+ (painful!)    Left Shoulder     Planes of Motion   Flexion: 3-   Abduction: 3-   External rotation at 0°: 4-   Internal rotation at 0°: 4-     Right Shoulder     Planes of Motion   Flexion: 3+   Abduction: 3+   External rotation at 0°: 4   Internal rotation at 0°: 4     Left Elbow   Flexion: 4+  Extension: 4+    Right Elbow   Flexion: 4+  Extension: 4+    Left Wrist/Hand      (2nd hand position)     Trial 1: 50    Trial 2: 47    Trial 3: 55    Average: 50.67    Right Wrist/Hand      (2nd hand position)     Trial 1: 47    Trial 2: 53    Trial 3: 53    Average: 51    Tests   Cervical   Positive neck flexor muscle endurance test.  Negative cervical distraction. Left Shoulder   Negative belly press, empty can, full can and lift-off. Right Shoulder   Negative belly press, drop arm, full can and lift-off. General Comments:    Upper quarter screen   Elbow: unremarkable  Hand/wrist: unremarkable      Flowsheet Rows      Flowsheet Row Most Recent Value   PT/OT G-Codes    Current Score 55   Projected Score 60               POC expires Unit limit Auth Expiration date PT/OT + Visit Limit?    1/17/23 4 na 100                           Visit/Unit Tracking  AUTH Status:  Date 10/17              No auth needed Used 2               Remaining  98              Neck FOTO 55(60)                   Precautions: B TKR, L RTC repair w/retear    Daily Treatment Diary:    Manuals 10/17            Neck STM 5            Manual traction             L shoulder 5                                      Neuro Re-Ed                                                                                                        Ther Ex             pulleys 30x            Cane FF             Shld stab @ 90 L 30x ea            Wall circles @90 L 30x                                      neckslevel 2' rot B            RTC             Ther Activity Gait Training                                       Modalities             Mech traction  nv

## 2023-10-18 ENCOUNTER — APPOINTMENT (OUTPATIENT)
Dept: PHYSICAL THERAPY | Facility: CLINIC | Age: 68
End: 2023-10-18
Payer: COMMERCIAL

## 2023-10-20 ENCOUNTER — APPOINTMENT (OUTPATIENT)
Dept: PHYSICAL THERAPY | Facility: CLINIC | Age: 68
End: 2023-10-20
Payer: COMMERCIAL

## 2023-10-20 ENCOUNTER — OFFICE VISIT (OUTPATIENT)
Dept: PHYSICAL THERAPY | Age: 68
End: 2023-10-20
Payer: COMMERCIAL

## 2023-10-20 DIAGNOSIS — M50.120 CERVICAL DISC DISORDER WITH RADICULOPATHY OF MID-CERVICAL REGION: ICD-10-CM

## 2023-10-20 DIAGNOSIS — M48.02 CERVICAL SPINAL STENOSIS: ICD-10-CM

## 2023-10-20 DIAGNOSIS — M54.2 CHRONIC NECK PAIN: Primary | ICD-10-CM

## 2023-10-20 DIAGNOSIS — M75.102 TEAR OF LEFT ROTATOR CUFF, UNSPECIFIED TEAR EXTENT, UNSPECIFIED WHETHER TRAUMATIC: ICD-10-CM

## 2023-10-20 DIAGNOSIS — M50.223 HERNIATION OF INTERVERTEBRAL DISC AT C6-C7 LEVEL: ICD-10-CM

## 2023-10-20 DIAGNOSIS — G89.29 CHRONIC NECK PAIN: Primary | ICD-10-CM

## 2023-10-20 PROCEDURE — 97012 MECHANICAL TRACTION THERAPY: CPT | Performed by: PHYSICAL THERAPIST

## 2023-10-20 PROCEDURE — 97140 MANUAL THERAPY 1/> REGIONS: CPT | Performed by: PHYSICAL THERAPIST

## 2023-10-20 PROCEDURE — 97110 THERAPEUTIC EXERCISES: CPT | Performed by: PHYSICAL THERAPIST

## 2023-10-20 NOTE — PROGRESS NOTES
Daily Note     Today's date: 10/20/2023  Patient name: Ching Peralta  : 1955  MRN: [id number]  Referring provider: Venu Gtz MD  Dx:   Encounter Diagnosis     ICD-10-CM    1. Chronic neck pain  M54.2     G89.29       2. Cervical disc disorder with radiculopathy of mid-cervical region  M50.120       3. Tear of left rotator cuff, unspecified tear extent, unspecified whether traumatic  M75.102       4. Herniation of intervertebral disc at C6-C7 level  M50.223       5. Cervical spinal stenosis  M48.02           Start Time: 1105  Stop Time: 1204  Total time in clinic (min): 59 minutes    Subjective: Patient reports she felt ok after last session. Constant neck pain. Traction always helps her. Objective: See treatment diary below      Assessment: Tolerated treatment well. Patient would benefit from continued PT. PROM left shoulder better with less guarding today. Added more TE's and mechanical traction. Patient states she has a home traction machine but advised not to do it today and see how she felt with treatment at PT. She did feel ok after session today. Plan: Continue per plan of care. POC expires Unit limit Auth Expiration date PT/OT + Visit Limit?    23 4 na 100                           Visit/Unit Tracking  AUTH Status:  Date 10/17 10/20             No auth needed Used 1 2              Remaining  98 97             Neck FOTO 55(60)                   Precautions: B TKR, L RTC repair w/retear    Daily Treatment Diary:    Manuals 10/17 10/20           Neck STM 5 5           Manual traction             L shoulder 5 5                                     Neuro Re-Ed                                                                                                        Ther Ex             pulleys 30x 30x           Cane FF  15x           Cane CP+  15x           Shld stab @ 90 L 30x ea 30xea           Wall circles @90 L 30x 30x ea                                     neckslevel 2' rot B 2' rot 2' chin tuck           RTC L  Blue IR, ER 15x ea Blue IR,       Red ER          Ther Activity                                       Gait Training                                       Modalities             [facility] traction c/s static  12# 10'

## 2023-10-25 ENCOUNTER — OFFICE VISIT (OUTPATIENT)
Dept: PHYSICAL THERAPY | Age: 68
End: 2023-10-25
Payer: COMMERCIAL

## 2023-10-25 ENCOUNTER — APPOINTMENT (OUTPATIENT)
Dept: PHYSICAL THERAPY | Facility: CLINIC | Age: 68
End: 2023-10-25
Payer: COMMERCIAL

## 2023-10-25 DIAGNOSIS — G89.29 CHRONIC NECK PAIN: Primary | ICD-10-CM

## 2023-10-25 DIAGNOSIS — M48.02 CERVICAL SPINAL STENOSIS: ICD-10-CM

## 2023-10-25 DIAGNOSIS — M50.120 CERVICAL DISC DISORDER WITH RADICULOPATHY OF MID-CERVICAL REGION: ICD-10-CM

## 2023-10-25 DIAGNOSIS — M54.2 CHRONIC NECK PAIN: Primary | ICD-10-CM

## 2023-10-25 DIAGNOSIS — M75.102 TEAR OF LEFT ROTATOR CUFF, UNSPECIFIED TEAR EXTENT, UNSPECIFIED WHETHER TRAUMATIC: ICD-10-CM

## 2023-10-25 PROCEDURE — 97110 THERAPEUTIC EXERCISES: CPT

## 2023-10-25 PROCEDURE — 97140 MANUAL THERAPY 1/> REGIONS: CPT

## 2023-10-25 PROCEDURE — 97012 MECHANICAL TRACTION THERAPY: CPT

## 2023-10-25 NOTE — PROGRESS NOTES
Daily Note     Today's date: 10/25/2023  Patient name: Renée Barnett  : 1955  MRN: [id number]  Referring provider: Donna Doyle MD  Dx:   Encounter Diagnosis     ICD-10-CM    1. Chronic neck pain  M54.2     G89.29       2. Cervical disc disorder with radiculopathy of mid-cervical region  M50.120       3. Cervical spinal stenosis  M48.02       4. Tear of left rotator cuff, unspecified tear extent, unspecified whether traumatic  M75.102                      Subjective: Notes little change, positive response to mechanical tx. Objective: See treatment diary below      Assessment: Tolerated treatment well, able to complete outlined TE without complaints. Positive initial response to traction. Plan: Continue per plan of care. POC expires Unit limit Auth Expiration date PT/OT + Visit Limit?    23 4 na 100                           Visit/Unit Tracking  AUTH Status:  Date 10/17 10/20 10/25            No auth needed Used 1 2 3             Remaining  98 [phone]            Neck FOTO 55(60)                   Precautions: B TKR, L RTC repair w/retear    Daily Treatment Diary:    Manuals 10/17 10/20 10/25          Neck STM 5 5           Manual traction             L shoulder 5 5 5                                    Neuro Re-Ed                                                                                                        Ther Ex             pulleys 30x 30x 30x          Cane FF  15x 15x          Cane CP+  15x 15x          Shld stab @ 90 L 30x ea 30xea 30x ea          Wall circles @90 L 30x 30x ea 30x ea                                    neckslevel 2' rot B 2' rot 2' chin tuck 2' neck          RTC   Blue IR, ER 15x ea Blue IR,       Red ER          B/L ER             Rows/ high row             Prone I &Y             Ther Activity                                       Gait Training                                       Modalities             [facility] traction c/s static  12# 10' 12# 10'

## 2023-10-27 ENCOUNTER — APPOINTMENT (OUTPATIENT)
Dept: PHYSICAL THERAPY | Age: 68
End: 2023-10-27
Payer: COMMERCIAL

## 2023-10-27 ENCOUNTER — APPOINTMENT (OUTPATIENT)
Dept: PHYSICAL THERAPY | Facility: CLINIC | Age: 68
End: 2023-10-27
Payer: COMMERCIAL

## 2023-11-01 ENCOUNTER — OFFICE VISIT (OUTPATIENT)
Dept: PHYSICAL THERAPY | Age: 68
End: 2023-11-01
Payer: COMMERCIAL

## 2023-11-01 ENCOUNTER — TELEPHONE (OUTPATIENT)
Dept: SURGERY | Facility: CLINIC | Age: 68
End: 2023-11-01

## 2023-11-01 DIAGNOSIS — G89.29 CHRONIC NECK PAIN: Primary | ICD-10-CM

## 2023-11-01 DIAGNOSIS — M48.02 CERVICAL SPINAL STENOSIS: ICD-10-CM

## 2023-11-01 DIAGNOSIS — M50.223 HERNIATION OF INTERVERTEBRAL DISC AT C6-C7 LEVEL: ICD-10-CM

## 2023-11-01 DIAGNOSIS — M50.120 CERVICAL DISC DISORDER WITH RADICULOPATHY OF MID-CERVICAL REGION: ICD-10-CM

## 2023-11-01 DIAGNOSIS — M54.2 CHRONIC NECK PAIN: Primary | ICD-10-CM

## 2023-11-01 PROCEDURE — 97012 MECHANICAL TRACTION THERAPY: CPT

## 2023-11-01 PROCEDURE — 97110 THERAPEUTIC EXERCISES: CPT

## 2023-11-01 NOTE — PROGRESS NOTES
Daily Note     Today's date: 2023  Patient name: Joseph Hennessy  : 1955  MRN: [id number]  Referring provider: Petra Boo MD  Dx:   Encounter Diagnosis     ICD-10-CM    1. Chronic neck pain  M54.2     G89.29       2. Cervical disc disorder with radiculopathy of mid-cervical region  M50.120       3. Cervical spinal stenosis  M48.02       4. Herniation of intervertebral disc at C6-C7 level  M50.223                      Subjective: Neck is feeling better, using home morales unit as well. Objective: See treatment diary below      Assessment: Mild progression in shoulder TE. L shoulder mobility gradual improvement. Positive response to mechanical tx. Pt would benefit from continued PT. Plan: Continue per plan of care. POC expires Unit limit Auth Expiration date PT/OT + Visit Limit?    23 4 na 100                           Visit/Unit Tracking  AUTH Status:  Date 10/17 10/20 10/25            No auth needed Used 1 2 3             Remaining  98 [phone] 96            Neck FOTO 55(60)                   Precautions: B TKR, L RTC repair w/retear    Daily Treatment Diary:    Manuals 10/17 10/20 10/25 11/1         Neck STM 5 5           Manual traction             L shoulder 5 5 5 5                                   Neuro Re-Ed                                                                                                        Ther Ex             pulleys 30x 30x 30x 30x         Cane FF  15x 15x 20x         Cane CP+  15x 15x 20x 2#         Shld stab @ 90 L 30x ea 30xea 30x ea 30x         Wall circles @90 L 30x 30x ea 30x ea 30x                                   neckslevel 2' rot B 2' rot 2' chin tuck 2' neck 2' neck         RTC   Blue IR, ER 15x ea Blue IR,       Red ER GTB BTB 20x         B/L ER    10x         Rows/ high row    20x         Prone I &Y    NV         Ther Activity                                       Gait Training                                       Modalities [facility] traction c/s static  12# 10' 12# 10'

## 2023-11-02 ENCOUNTER — TELEPHONE (OUTPATIENT)
Dept: SURGERY | Facility: CLINIC | Age: 68
End: 2023-11-02

## 2023-11-02 NOTE — TELEPHONE ENCOUNTER
Patient came to the  and expressed she waited to long to be seen for her appointment. I did tell her at the time of check in that we were behind with appointments and it might be 20 minutes or so. I offered to reschedule and to refund her copay back and she stated no. I did refund her copay and a the check was voided and mailed back to the patient.

## 2023-11-03 ENCOUNTER — APPOINTMENT (OUTPATIENT)
Dept: PHYSICAL THERAPY | Age: 68
End: 2023-11-03
Payer: COMMERCIAL

## 2023-11-07 NOTE — PROGRESS NOTES
Assessment/Plan:    History of colon polyps  Patient is a pleasant 19-year-old female presenting with a history of colon polyps for which 5-year colonoscopy is now indicated. The technical details of the procedure as well as the benefits and risks were thoroughly reviewed including risks related to anesthesia bleeding and gastrointestinal tract injury necessitating emergent surgical intervention. All questions answered to the satisfaction of the patient and informed consent obtained to proceed. Diagnoses and all orders for this visit:    History of colon polyps          Subjective:      Patient ID: Daniela Zapata is a 76 y.o. female. Patient is here for a pre op for c-scope. Last c-scope was 5 years ago. Polyps were removed but it was benign. Patient states she has diarrhea all the time but denies nausea/vomiting, constipation, bloody stools, rectal pain/bleeding, abd pain, trouble eating/drinking or fevers/chills. Patient denies Family/ Personal med hx of colon cancer. Albert VANN MA            Review of Systems   Constitutional:  Negative for chills and fever. HENT:  Negative for ear pain and sore throat. Eyes:  Negative for pain and visual disturbance. Respiratory:  Negative for cough and shortness of breath. Cardiovascular:  Negative for chest pain and palpitations. Gastrointestinal:  Negative for abdominal pain and vomiting. Genitourinary:  Negative for dysuria and hematuria. Musculoskeletal:  Negative for arthralgias and back pain. Skin:  Negative for color change and rash. Neurological:  Negative for seizures and syncope. All other systems reviewed and are negative.         Objective:      /62 (BP Location: Left arm, Patient Position: Sitting, Cuff Size: Standard)   Pulse 79   Temp 97.8 °F (36.6 °C) (Temporal)   Resp 18   Ht 5' 8" (1.727 m)   Wt 113 kg (250 lb)   LMP  (LMP Unknown)   SpO2 97%   BMI 38.01 kg/m²          Physical Exam  Constitutional: Appearance: She is well-developed. HENT:      Head: Normocephalic and atraumatic. Eyes:      Conjunctiva/sclera: Conjunctivae normal.      Pupils: Pupils are equal, round, and reactive to light. Cardiovascular:      Rate and Rhythm: Normal rate and regular rhythm. Pulmonary:      Effort: Pulmonary effort is normal.      Breath sounds: Normal breath sounds. Abdominal:      General: Bowel sounds are normal.      Palpations: Abdomen is soft. Comments: Benign abdominal examination   Musculoskeletal:         General: Normal range of motion. Cervical back: Normal range of motion and neck supple. Skin:     General: Skin is warm and dry. Neurological:      Mental Status: She is alert and oriented to person, place, and time. Psychiatric:         Behavior: Behavior normal.         Thought Content:  Thought content normal.         Judgment: Judgment normal.

## 2023-11-08 ENCOUNTER — OFFICE VISIT (OUTPATIENT)
Dept: SURGERY | Facility: CLINIC | Age: 68
End: 2023-11-08
Payer: COMMERCIAL

## 2023-11-08 VITALS
HEART RATE: 79 BPM | DIASTOLIC BLOOD PRESSURE: 62 MMHG | RESPIRATION RATE: 18 BRPM | HEIGHT: 68 IN | OXYGEN SATURATION: 97 % | BODY MASS INDEX: 37.89 KG/M2 | WEIGHT: 250 LBS | TEMPERATURE: 97.8 F | SYSTOLIC BLOOD PRESSURE: 112 MMHG

## 2023-11-08 DIAGNOSIS — Z86.010 HISTORY OF COLON POLYPS: Primary | ICD-10-CM

## 2023-11-08 PROBLEM — E11.29 TYPE 2 DIABETES MELLITUS WITH MICROALBUMINURIA, WITH LONG-TERM CURRENT USE OF INSULIN (HCC): Status: RESOLVED | Noted: 2022-08-25 | Resolved: 2023-11-08

## 2023-11-08 PROBLEM — Z86.0100 HISTORY OF COLON POLYPS: Status: ACTIVE | Noted: 2023-11-08

## 2023-11-08 PROBLEM — R80.9 TYPE 2 DIABETES MELLITUS WITH MICROALBUMINURIA, WITH LONG-TERM CURRENT USE OF INSULIN (HCC): Status: RESOLVED | Noted: 2022-08-25 | Resolved: 2023-11-08

## 2023-11-08 PROBLEM — Z79.4 TYPE 2 DIABETES MELLITUS WITH MICROALBUMINURIA, WITH LONG-TERM CURRENT USE OF INSULIN (HCC): Status: RESOLVED | Noted: 2022-08-25 | Resolved: 2023-11-08

## 2023-11-08 PROCEDURE — 99203 OFFICE O/P NEW LOW 30 MIN: CPT | Performed by: SURGERY

## 2023-11-08 NOTE — ASSESSMENT & PLAN NOTE
Patient is a pleasant 27-year-old female presenting with a history of colon polyps for which 5-year colonoscopy is now indicated. The technical details of the procedure as well as the benefits and risks were thoroughly reviewed including risks related to anesthesia bleeding and gastrointestinal tract injury necessitating emergent surgical intervention. All questions answered to the satisfaction of the patient and informed consent obtained to proceed.

## 2023-11-08 NOTE — H&P
Assessment/Plan:    History of colon polyps  Patient is a pleasant 70-year-old female presenting with a history of colon polyps for which 5-year colonoscopy is now indicated. The technical details of the procedure as well as the benefits and risks were thoroughly reviewed including risks related to anesthesia bleeding and gastrointestinal tract injury necessitating emergent surgical intervention. All questions answered to the satisfaction of the patient and informed consent obtained to proceed. Diagnoses and all orders for this visit:    History of colon polyps          Subjective:      Patient ID: Tomasz Sinclair is a 76 y.o. female. Patient is here for a pre op for c-scope. Last c-scope was 5 years ago. Polyps were removed but it was benign. Patient states she has diarrhea all the time but denies nausea/vomiting, constipation, bloody stools, rectal pain/bleeding, abd pain, trouble eating/drinking or fevers/chills. Patient denies Family/ Personal med hx of colon cancer. Albert VANN MA            Review of Systems   Constitutional:  Negative for chills and fever. HENT:  Negative for ear pain and sore throat. Eyes:  Negative for pain and visual disturbance. Respiratory:  Negative for cough and shortness of breath. Cardiovascular:  Negative for chest pain and palpitations. Gastrointestinal:  Negative for abdominal pain and vomiting. Genitourinary:  Negative for dysuria and hematuria. Musculoskeletal:  Negative for arthralgias and back pain. Skin:  Negative for color change and rash. Neurological:  Negative for seizures and syncope. All other systems reviewed and are negative.         Objective:      /62 (BP Location: Left arm, Patient Position: Sitting, Cuff Size: Standard)   Pulse 79   Temp 97.8 °F (36.6 °C) (Temporal)   Resp 18   Ht 5' 8" (1.727 m)   Wt 113 kg (250 lb)   LMP  (LMP Unknown)   SpO2 97%   BMI 38.01 kg/m²          Physical Exam  Constitutional: Appearance: She is well-developed. HENT:      Head: Normocephalic and atraumatic. Eyes:      Conjunctiva/sclera: Conjunctivae normal.      Pupils: Pupils are equal, round, and reactive to light. Cardiovascular:      Rate and Rhythm: Normal rate and regular rhythm. Pulmonary:      Effort: Pulmonary effort is normal.      Breath sounds: Normal breath sounds. Abdominal:      General: Bowel sounds are normal.      Palpations: Abdomen is soft. Comments: Benign abdominal examination   Musculoskeletal:         General: Normal range of motion. Cervical back: Normal range of motion and neck supple. Skin:     General: Skin is warm and dry. Neurological:      Mental Status: She is alert and oriented to person, place, and time. Psychiatric:         Behavior: Behavior normal.         Thought Content:  Thought content normal.         Judgment: Judgment normal.

## 2023-11-08 NOTE — H&P (VIEW-ONLY)
Assessment/Plan:    History of colon polyps  Patient is a pleasant 51-year-old female presenting with a history of colon polyps for which 5-year colonoscopy is now indicated. The technical details of the procedure as well as the benefits and risks were thoroughly reviewed including risks related to anesthesia bleeding and gastrointestinal tract injury necessitating emergent surgical intervention. All questions answered to the satisfaction of the patient and informed consent obtained to proceed. Diagnoses and all orders for this visit:    History of colon polyps          Subjective:      Patient ID: Shannon Dawson is a 76 y.o. female. Patient is here for a pre op for c-scope. Last c-scope was 5 years ago. Polyps were removed but it was benign. Patient states she has diarrhea all the time but denies nausea/vomiting, constipation, bloody stools, rectal pain/bleeding, abd pain, trouble eating/drinking or fevers/chills. Patient denies Family/ Personal med hx of colon cancer. Alebrt VANN MA            Review of Systems   Constitutional:  Negative for chills and fever. HENT:  Negative for ear pain and sore throat. Eyes:  Negative for pain and visual disturbance. Respiratory:  Negative for cough and shortness of breath. Cardiovascular:  Negative for chest pain and palpitations. Gastrointestinal:  Negative for abdominal pain and vomiting. Genitourinary:  Negative for dysuria and hematuria. Musculoskeletal:  Negative for arthralgias and back pain. Skin:  Negative for color change and rash. Neurological:  Negative for seizures and syncope. All other systems reviewed and are negative.         Objective:      /62 (BP Location: Left arm, Patient Position: Sitting, Cuff Size: Standard)   Pulse 79   Temp 97.8 °F (36.6 °C) (Temporal)   Resp 18   Ht 5' 8" (1.727 m)   Wt 113 kg (250 lb)   LMP  (LMP Unknown)   SpO2 97%   BMI 38.01 kg/m²          Physical Exam  Constitutional: Appearance: She is well-developed. HENT:      Head: Normocephalic and atraumatic. Eyes:      Conjunctiva/sclera: Conjunctivae normal.      Pupils: Pupils are equal, round, and reactive to light. Cardiovascular:      Rate and Rhythm: Normal rate and regular rhythm. Pulmonary:      Effort: Pulmonary effort is normal.      Breath sounds: Normal breath sounds. Abdominal:      General: Bowel sounds are normal.      Palpations: Abdomen is soft. Comments: Benign abdominal examination   Musculoskeletal:         General: Normal range of motion. Cervical back: Normal range of motion and neck supple. Skin:     General: Skin is warm and dry. Neurological:      Mental Status: She is alert and oriented to person, place, and time. Psychiatric:         Behavior: Behavior normal.         Thought Content:  Thought content normal.         Judgment: Judgment normal.

## 2023-11-15 ENCOUNTER — APPOINTMENT (OUTPATIENT)
Dept: PHYSICAL THERAPY | Age: 68
End: 2023-11-15
Payer: COMMERCIAL

## 2023-11-17 ENCOUNTER — APPOINTMENT (OUTPATIENT)
Dept: PHYSICAL THERAPY | Age: 68
End: 2023-11-17
Payer: COMMERCIAL

## 2023-11-21 ENCOUNTER — ANESTHESIA EVENT (OUTPATIENT)
Dept: PERIOP | Facility: HOSPITAL | Age: 68
End: 2023-11-21

## 2023-11-21 ENCOUNTER — ANESTHESIA (OUTPATIENT)
Dept: PERIOP | Facility: HOSPITAL | Age: 68
End: 2023-11-21

## 2023-11-21 ENCOUNTER — HOSPITAL ENCOUNTER (OUTPATIENT)
Dept: PERIOP | Facility: HOSPITAL | Age: 68
Setting detail: OUTPATIENT SURGERY
Discharge: HOME | End: 2023-11-21
Attending: SURGERY
Payer: COMMERCIAL

## 2023-11-21 VITALS
RESPIRATION RATE: 16 BRPM | BODY MASS INDEX: 37.89 KG/M2 | OXYGEN SATURATION: 97 % | WEIGHT: 250 LBS | TEMPERATURE: 97.1 F | DIASTOLIC BLOOD PRESSURE: 61 MMHG | HEART RATE: 65 BPM | SYSTOLIC BLOOD PRESSURE: 119 MMHG | HEIGHT: 68 IN

## 2023-11-21 DIAGNOSIS — Z86.010 HISTORY OF COLON POLYPS: ICD-10-CM

## 2023-11-21 PROBLEM — D12.5 ADENOMATOUS POLYP OF SIGMOID COLON: Status: ACTIVE | Noted: 2023-11-21

## 2023-11-21 PROBLEM — K64.1 PROLAPSED INTERNAL HEMORRHOIDS, GRADE 2: Status: ACTIVE | Noted: 2023-11-21

## 2023-11-21 PROBLEM — D12.3 ADENOMATOUS POLYP OF TRANSVERSE COLON: Status: ACTIVE | Noted: 2023-11-21

## 2023-11-21 PROCEDURE — 88305 TISSUE EXAM BY PATHOLOGIST: CPT | Performed by: PATHOLOGY

## 2023-11-21 PROCEDURE — 45380 COLONOSCOPY AND BIOPSY: CPT | Performed by: SURGERY

## 2023-11-21 RX ORDER — LIDOCAINE HYDROCHLORIDE 20 MG/ML
INJECTION, SOLUTION EPIDURAL; INFILTRATION; INTRACAUDAL; PERINEURAL AS NEEDED
Status: DISCONTINUED | OUTPATIENT
Start: 2023-11-21 | End: 2023-11-21

## 2023-11-21 RX ORDER — SODIUM CHLORIDE, SODIUM LACTATE, POTASSIUM CHLORIDE, CALCIUM CHLORIDE 600; 310; 30; 20 MG/100ML; MG/100ML; MG/100ML; MG/100ML
INJECTION, SOLUTION INTRAVENOUS CONTINUOUS PRN
Status: DISCONTINUED | OUTPATIENT
Start: 2023-11-21 | End: 2023-11-21

## 2023-11-21 RX ORDER — PROPOFOL 10 MG/ML
INJECTION, EMULSION INTRAVENOUS AS NEEDED
Status: DISCONTINUED | OUTPATIENT
Start: 2023-11-21 | End: 2023-11-21

## 2023-11-21 RX ADMIN — SODIUM CHLORIDE, SODIUM LACTATE, POTASSIUM CHLORIDE, AND CALCIUM CHLORIDE: .6; .31; .03; .02 INJECTION, SOLUTION INTRAVENOUS at 06:55

## 2023-11-21 RX ADMIN — PROPOFOL 130 MCG/KG/MIN: 10 INJECTION, EMULSION INTRAVENOUS at 07:32

## 2023-11-21 RX ADMIN — LIDOCAINE HYDROCHLORIDE 60 MG: 20 INJECTION, SOLUTION EPIDURAL; INFILTRATION; INTRACAUDAL; PERINEURAL at 07:31

## 2023-11-21 RX ADMIN — PROPOFOL 80 MG: 10 INJECTION, EMULSION INTRAVENOUS at 07:31

## 2023-11-21 NOTE — ANESTHESIA PREPROCEDURE EVALUATION
Procedure:  COLONOSCOPY    Relevant Problems   CARDIO   (+) Primary hypertension   (+) Pure hypertriglyceridemia      GI/HEPATIC   (+) Gastroesophageal reflux disease      NEURO/PSYCH   (+) Chronic neck pain      T2DM  NPO appropriate  Finished prep at 4am    Physical Exam    Airway    Mallampati score: III  TM Distance: >3 FB  Neck ROM: full     Dental        Cardiovascular  Rhythm: regular    Pulmonary   Breath sounds clear to auscultation    Other Findings  post-pubertal.      Anesthesia Plan  ASA Score- 2     Anesthesia Type- IV sedation with anesthesia with ASA Monitors. Additional Monitors:     Airway Plan:            Plan Factors-    Chart reviewed. EKG reviewed. Existing labs reviewed. Patient summary reviewed. Patient is not a current smoker. Obstructive sleep apnea risk education given perioperatively. Induction- intravenous. Postoperative Plan-     Informed Consent- Anesthetic plan and risks discussed with patient. I personally reviewed this patient with the CRNA. Discussed and agreed on the Anesthesia Plan with the CRNA. Ailyn Cottrell

## 2023-11-21 NOTE — INTERVAL H&P NOTE
H&P reviewed. After examining the patient I find no changes in the patients condition since the H&P had been written.     Vitals:    11/21/23 0632   BP: 127/60   Pulse: 82   Resp: 18   Temp: 97.8 °F (36.6 °C)   SpO2: 92%
[facility]

## 2023-11-21 NOTE — ANESTHESIA POSTPROCEDURE EVALUATION
Post-Op Assessment Note    CV Status:  Stable  Pain Score: 0    Pain management: adequate       Mental Status:  Arousable   Hydration Status:  Stable   PONV Controlled:  None   Airway Patency:  Patent     Post Op Vitals Reviewed: Yes    No anethesia notable event occurred.     Staff: CRNA               BP   100/57   Temp   98.5   Pulse  72   Resp   16   SpO2   96%

## 2023-11-27 PROBLEM — E11.9 TYPE 2 DIABETES MELLITUS WITHOUT COMPLICATION, WITHOUT LONG-TERM CURRENT USE OF INSULIN (HCC): Status: ACTIVE | Noted: 2023-11-27

## 2023-11-27 PROCEDURE — 88305 TISSUE EXAM BY PATHOLOGIST: CPT | Performed by: PATHOLOGY

## 2023-11-27 NOTE — PROGRESS NOTES
Belkys Guerra 1955 female MRN: [id number]      ASSESSMENT/PLAN  Problem List Items Addressed This Visit        Endocrine    Type 2 diabetes mellitus without complication, without long-term current use of insulin (HCC) - Primary    Relevant Medications    Blood Glucose Monitoring Suppl (OneTouch Verio Reflect) w/Device KIT    glucose blood (OneTouch Verio) test strip    OneTouch Delica Lancets 57X MISC    Other Relevant Orders    POCT hemoglobin A1c (Completed)    POCT urine microalbumin/creatinine (Completed)    IRIS Diabetic eye exam    Comprehensive metabolic panel    CBC and differential    Lipid Panel with Direct LDL reflex    TSH, 3rd generation with Free T4 reflex       Cardiovascular and Mediastinum    Primary hypertension    Relevant Orders    Comprehensive metabolic panel    Lipid Panel with Direct LDL reflex       Other    Vitamin D deficiency    Relevant Orders    Vitamin D 25 hydroxy    Pure hypertriglyceridemia    Relevant Orders    Comprehensive metabolic panel    Lipid Panel with Direct LDL reflex    Primary insomnia    Relevant Orders    [facility] All Prescribed Meds and Special Instructions    Amphetamines, Methamphetamines    Butalbital    Phenobarbital    Secobarbital    Alprazolam    Clonazepam    Diazepam, Temazepam, Oxazepam    Lorazepam    Gabapentin    Pregabalin    Cocaine    Heroin    Buprenorphine    Levorphanol    Meperidine    Naltrexone    Fentanyl    Methadone    Oxycodone    Tapentadol    THC    Tramadol    Codeine, Hydrocodone, Hydropmorphone, Morphine    Bath Salts    Ethyl Glucuronide/Ethyl Sulfate    Kratom    Spice    Methylphenidate    Phentermine    Validity Oxidant    Validity Creatinine    Validity pH    Validity Specific    B12 deficiency    Relevant Orders    Vitamin B12   Other Visit Diagnoses     Tachycardia        Relevant Orders    POCT ECG (Completed)    Post-nasal drip        Relevant Medications    fluticasone (FLONASE) 50 mcg/act nasal spray    Burning tongue        Relevant Orders    Vitamin B12    Iron Panel (Includes Ferritin, Iron Sat%, Iron, and TIBC)    Folate    Zinc    Vitamin B6        DM: A1c 7.1% (from 6.9)   Foot exam as below   Eye exam collected   Urine microalbumin/cr ratio (+) microalbuminuria -- on ARB    HTN: Within goal   HLD: Update lipids     Encouraged trial of Flonase for congestion/post-nasal drip. Will update labs as above to evaluate for deficiency causing burning tongue symptoms -- if benign, could consider trial of Gabapentin for symptom relief. In office EKG demonstrated tachycardia and abnormal rhythm. Pt was initially symptomatic at onset, but denies current symptoms. Discussed that I would recommend pt go to ED for further management/monitoring -- pt defers stating she has to take care of her elderly mother and no one else can help. Pt states she would be agreeable to seeing Cardiology as an outpatient. EKG reviewed by Dr. Laury Saldana via Nuha Silverman Text who feels pt is in aflutter and recommends ED evaluation -- call placed to pt to re-iterate recommendation. Updated UDS + contract for Ambien     Flu UTD, COVID completed primary + booster, Shingles UTD, Pneumo UTD, TDap UTD      Future Appointments   Date Time Provider [address]   12/11/2023  2:40 PM [address] [facility] [address]   12/13/2023 10:15 AM Sudhakar Aguilar PA-C [facility]          SUBJECTIVE  CC: Diabetes, Nasal Congestion (Only at nighttime, makes it so she cannot breath), and Mouth burning (Patient gets burning in her mouth)      HPI:  Tomasz Sinclair is a 76 y.o. female who presents for chronic follow up as below. DM: Home sugars none   HTN: Home BPs none    HLD: On Tricor     Of note, pt was noted to be tachycardic on initial evaluation. Pt states she felt lightheaded and palpitations while sitting in the waiting room, but this has resolved. She denies any chest pain, shortness of breath currently. Denies feeling this previously. Review of Systems   HENT:  Positive for congestion and postnasal drip (at night). Negative for ear pain, rhinorrhea and sore throat. (+) burning tongue   Eyes:  Visual disturbance: (+) dry eye -- insurance won't pay for the drops. Respiratory:  Negative for shortness of breath. Cardiovascular:  Positive for palpitations. Negative for chest pain. Gastrointestinal:  Negative for abdominal pain (cramping after colonoscopy), constipation and diarrhea. Endocrine: Negative for polyuria. Genitourinary:  Negative for dysuria. Neurological:  Positive for light-headedness. Negative for dizziness and headaches. Psychiatric/Behavioral:  Positive for sleep disturbance (even with Ambien).         Historical Information   The patient history was reviewed and updated as follows:    Past Medical History:   Diagnosis Date   • Bacterial vaginitis 10/8/2018   • Complex atypical endometrial hyperplasia 1/23/2018   • CPAP (continuous positive airway pressure) dependence     does not use machine   • DJD (degenerative joint disease)    • Dyspareunia 6/16/2015   • Gait abnormality    • GERD (gastroesophageal reflux disease)    • History of transfusion    • Hyperlipidemia    • Obesity (BMI 35.0-39.9 without comorbidity)    • Sleep apnea    • Uterine polyp 12/1/2017     Past Surgical History:   Procedure Laterality Date   • BLADDER SUSPENSION     • CHOLECYSTECTOMY     • COLONOSCOPY     • CYSTOSCOPY N/A 01/23/2018    Procedure: CYSTOSCOPY;  Surgeon: Mahad Friend MD;  [facility];  Service: Gynecology Oncology   • EYE SURGERY     • HYSTERECTOMY     • JOINT REPLACEMENT Bilateral    • CT HYSTEROSCOPY BX ENDOMETRIUM&/POLYPC W/WO D&C N/A 12/01/2017    Procedure: DILATATION AND CURETTAGE (D&C) WITH HYSTEROSCOPY; POLYPECTOMY;  Surgeon: Byron Rocha MD;  [facility];  Service: Gynecology   • CT LAPS TOTAL HYSTERECT 250 GM/< W/RMVL TUBE/OVARY N/A 01/23/2018    Procedure: ROBOTIC ASSISTED TOTAL LAPAROSCOPIC HYSTERECTOMY; BILATERAL SALPINGOOPHERECTOMY;  Surgeon: Stephanie Lilly MD;  [facility];  Service: Gynecology Oncology   • REDUCTION MAMMAPLASTY Bilateral 18yrs ago   • REPLACEMENT TOTAL KNEE BILATERAL     • ROTATOR CUFF REPAIR Left    • TUBAL LIGATION       Family History   Problem Relation Age of Onset   • No Known Problems Mother    • Emphysema Father    • Bone cancer Sister    • Lung cancer Sister    • Brain cancer Sister    • No Known Problems Sister    • No Known Problems Sister    • No Known Problems Sister    • No Known Problems Daughter    • Breast cancer Maternal Grandmother    • No Known Problems Maternal Aunt    • No Known Problems Maternal Aunt    • No Known Problems Paternal Aunt    • No Known Problems Paternal Aunt    • No Known Problems Paternal Aunt    • No Known Problems Paternal Aunt    • Ovarian cancer Neg Hx    • Colon cancer Neg Hx       Social History   Social History     Substance and Sexual Activity   Alcohol Use No     Social History     Substance and Sexual Activity   Drug Use No     Social History     Tobacco Use   Smoking Status Never   Smokeless Tobacco Never       Medications:     Current Outpatient Medications:   •  Blood Glucose Monitoring Suppl (OneTouch Verio Reflect) w/Device KIT, Check blood sugars once daily. Please substitute with appropriate alternative as covered by patient's insurance. Dx: E11.65, Disp: 1 kit, Rfl: 0  •  fluticasone (FLONASE) 50 mcg/act nasal spray, 1 spray into each nostril daily, Disp: 16 g, Rfl: 1  •  glucose blood (OneTouch Verio) test strip, Check blood sugars once daily. Please substitute with appropriate alternative as covered by patient's insurance. Dx: E11.65, Disp: 100 each, Rfl: 3  •  OneTouch Delica Lancets 86F MISC, Check blood sugars once daily. Please substitute with appropriate alternative as covered by patient's insurance.  Dx: E11.65, Disp: 100 each, Rfl: 3  •  Cholecalciferol (Vitamin D3) 125 MCG (5000 UT) TABS, Take 1 tablet (5,000 Units total) by mouth daily, Disp: 90 tablet, Rfl: 1  •  ciclopirox (PENLAC) 8 % solution, Apply as directed, Disp: , Rfl:   •  estradiol (ESTRACE) 0.1 mg/g vaginal cream, APPLY 2 TO 3 TIMES PER WEEK, Disp: 42.5 g, Rfl: 2  •  fenofibrate (TRICOR) 145 mg tablet, TAKE 1 TABLET DAILY, Disp: 90 tablet, Rfl: 3  •  Lutein 20 MG CAPS, Take 1 capsule by mouth daily, Disp: , Rfl:   •  meclizine (ANTIVERT) 25 mg tablet, Take 1 tablet (25 mg total) by mouth 3 (three) times a day as needed for dizziness, Disp: 90 tablet, Rfl: 1  •  naproxen (NAPROSYN) 500 mg tablet, TAKE 1 TABLET TWICE A DAY AS NEEDED FOR MILD PAIN, Disp: 60 tablet, Rfl: 11  •  omeprazole (PriLOSEC) 20 mg delayed release capsule, TAKE 1 CAPSULE DAILY, Disp: 90 capsule, Rfl: 3  •  Propylene Glycol 0.6 % SOLN, Apply 1 drop to eye, Disp: , Rfl:   •  valsartan (DIOVAN) 160 mg tablet, TAKE 1 TABLET DAILY, Disp: 90 tablet, Rfl: 1  •  vitamin B-12 (VITAMIN B-12) 1,000 mcg tablet, TAKE 1 TABLET DAILY, Disp: 90 tablet, Rfl: 3  •  zolpidem (AMBIEN) 10 mg tablet, TAKE 1 TABLET DAILY AT BEDTIME, Disp: 30 tablet, Rfl: 0  Allergies   Allergen Reactions   • Doxycycline Hives       OBJECTIVE    Vitals:   Vitals:    11/28/23 1255   BP: 123/78   Pulse: (!) 146   Temp: 97.5 °F (36.4 °C)   SpO2: 94%   Weight: 110 kg (243 lb)   Height: 5' 8" (1.727 m)           Physical Exam  Vitals and nursing note reviewed. Constitutional:       General: She is not in acute distress. Appearance: Normal appearance. HENT:      Head: Normocephalic and atraumatic. Right Ear: Tympanic membrane, ear canal and external ear normal.      Left Ear: Tympanic membrane, ear canal and external ear normal.      Nose: Nose normal.      Mouth/Throat:      Mouth: Mucous membranes are moist.      Pharynx: No oropharyngeal exudate or posterior oropharyngeal erythema. Eyes:      Conjunctiva/sclera: Conjunctivae normal.   Cardiovascular:      Rate and Rhythm: Regular rhythm.  Tachycardia present. Pulses: no weak pulses          Dorsalis pedis pulses are 2+ on the right side and 2+ on the left side. Pulmonary:      Effort: Pulmonary effort is normal. No respiratory distress. Breath sounds: Normal breath sounds. Abdominal:      General: Bowel sounds are normal. There is no distension. Palpations: Abdomen is soft. Tenderness: There is no abdominal tenderness. Musculoskeletal:      Right lower leg: No edema. Left lower leg: No edema. Feet:      Right foot:      Skin integrity: Callus and dry skin present. Left foot:      Skin integrity: Callus and dry skin present. Lymphadenopathy:      Cervical: No cervical adenopathy. Skin:     General: Skin is warm and dry. Neurological:      Mental Status: She is alert. Comments: Grossly intact   Psychiatric:         Mood and Affect: Mood normal.            Patient's shoes and socks removed. Right Foot/Ankle   Right Foot Inspection  Skin Exam: dry skin, callus and callus. Toe Exam:  no right toe deformity    Sensory   Vibration: intact  Monofilament testing: intact    Vascular  Capillary refills: < 3 seconds  The right DP pulse is 2+. Left Foot/Ankle  Left Foot Inspection  Skin Exam: dry skin and callus. Toe Exam: No left toe deformity. Sensory   Vibration: intact  Monofilament testing: intact    Vascular  Capillary refills: < 3 seconds  The left DP pulse is 2+.      Assign Risk Category  No deformity present  No loss of protective sensation  No weak pulses  Risk: 0            [facility]   11/28/2023  2:00 PM

## 2023-11-28 ENCOUNTER — HOSPITAL ENCOUNTER (EMERGENCY)
Facility: HOSPITAL | Age: 68
Discharge: HOME | End: 2023-11-28
Attending: EMERGENCY MEDICINE
Payer: COMMERCIAL

## 2023-11-28 ENCOUNTER — APPOINTMENT (EMERGENCY)
Dept: RADIOLOGY | Facility: HOSPITAL | Age: 68
End: 2023-11-28
Payer: COMMERCIAL

## 2023-11-28 ENCOUNTER — OFFICE VISIT (OUTPATIENT)
Dept: FAMILY MEDICINE CLINIC | Facility: CLINIC | Age: 68
End: 2023-11-28
Payer: COMMERCIAL

## 2023-11-28 ENCOUNTER — TELEPHONE (OUTPATIENT)
Dept: FAMILY MEDICINE CLINIC | Facility: CLINIC | Age: 68
End: 2023-11-28

## 2023-11-28 VITALS
BODY MASS INDEX: 36.83 KG/M2 | HEART RATE: 146 BPM | TEMPERATURE: 97.5 F | HEIGHT: 68 IN | WEIGHT: 243 LBS | DIASTOLIC BLOOD PRESSURE: 78 MMHG | OXYGEN SATURATION: 94 % | SYSTOLIC BLOOD PRESSURE: 123 MMHG

## 2023-11-28 VITALS
SYSTOLIC BLOOD PRESSURE: 140 MMHG | DIASTOLIC BLOOD PRESSURE: 63 MMHG | TEMPERATURE: 98.3 F | HEIGHT: 68 IN | OXYGEN SATURATION: 94 % | BODY MASS INDEX: 36.95 KG/M2 | HEART RATE: 64 BPM | RESPIRATION RATE: 18 BRPM

## 2023-11-28 DIAGNOSIS — R00.2 PALPITATIONS: ICD-10-CM

## 2023-11-28 DIAGNOSIS — R00.0 TACHYCARDIA: ICD-10-CM

## 2023-11-28 DIAGNOSIS — E53.8 B12 DEFICIENCY: ICD-10-CM

## 2023-11-28 DIAGNOSIS — K14.6 BURNING TONGUE: ICD-10-CM

## 2023-11-28 DIAGNOSIS — F51.01 PRIMARY INSOMNIA: ICD-10-CM

## 2023-11-28 DIAGNOSIS — R09.82 POST-NASAL DRIP: ICD-10-CM

## 2023-11-28 DIAGNOSIS — E55.9 VITAMIN D DEFICIENCY: ICD-10-CM

## 2023-11-28 DIAGNOSIS — E11.9 TYPE 2 DIABETES MELLITUS WITHOUT COMPLICATION, WITHOUT LONG-TERM CURRENT USE OF INSULIN (HCC): Primary | ICD-10-CM

## 2023-11-28 DIAGNOSIS — I10 PRIMARY HYPERTENSION: ICD-10-CM

## 2023-11-28 DIAGNOSIS — R94.31 ABNORMAL EKG: Primary | ICD-10-CM

## 2023-11-28 DIAGNOSIS — E78.1 PURE HYPERTRIGLYCERIDEMIA: ICD-10-CM

## 2023-11-28 LAB
2HR DELTA HS TROPONIN: -1 NG/L
ALBUMIN SERPL BCP-MCNC: 4.3 G/DL (ref 3.5–5)
ALBUMIN/CREAT UR: ABNORMAL
ALP SERPL-CCNC: 60 U/L (ref 34–104)
ALT SERPL W P-5'-P-CCNC: 24 U/L (ref 7–52)
ANION GAP SERPL CALCULATED.3IONS-SCNC: 8 MMOL/L
AST SERPL W P-5'-P-CCNC: 40 U/L (ref 13–39)
BASOPHILS # BLD AUTO: 0.08 THOUSANDS/ÂΜL (ref 0–0.1)
BASOPHILS NFR BLD AUTO: 1 % (ref 0–1)
BILIRUB SERPL-MCNC: 0.32 MG/DL (ref 0.2–1)
BUN SERPL-MCNC: 20 MG/DL (ref 5–25)
CALCIUM SERPL-MCNC: 9.3 MG/DL (ref 8.4–10.2)
CARDIAC TROPONIN I PNL SERPL HS: 6 NG/L
CARDIAC TROPONIN I PNL SERPL HS: 7 NG/L
CHLORIDE SERPL-SCNC: 105 MMOL/L (ref 96–108)
CO2 SERPL-SCNC: 26 MMOL/L (ref 21–32)
CREAT SERPL-MCNC: 1.18 MG/DL (ref 0.6–1.3)
CREAT SERPL-MCNC: 300 MG/DL
EOSINOPHIL # BLD AUTO: 0.31 THOUSAND/ÂΜL (ref 0–0.61)
EOSINOPHIL NFR BLD AUTO: 3 % (ref 0–6)
ERYTHROCYTE [DISTWIDTH] IN BLOOD BY AUTOMATED COUNT: 13.3 % (ref 11.6–15.1)
GFR SERPL CREATININE-BSD FRML MDRD: 47 ML/MIN/1.73SQ M
GLUCOSE SERPL-MCNC: 122 MG/DL (ref 65–140)
HCT VFR BLD AUTO: 41.9 % (ref 34.8–46.1)
HGB BLD-MCNC: 13.2 G/DL (ref 11.5–15.4)
IMM GRANULOCYTES # BLD AUTO: 0.06 THOUSAND/UL (ref 0–0.2)
IMM GRANULOCYTES NFR BLD AUTO: 1 % (ref 0–2)
LYMPHOCYTES # BLD AUTO: 2.66 THOUSANDS/ÂΜL (ref 0.6–4.47)
LYMPHOCYTES NFR BLD AUTO: 26 % (ref 14–44)
MCH RBC QN AUTO: 28.8 PG (ref 26.8–34.3)
MCHC RBC AUTO-ENTMCNC: 31.5 G/DL (ref 31.4–37.4)
MCV RBC AUTO: 91 FL (ref 82–98)
MONOCYTES # BLD AUTO: 0.84 THOUSAND/ÂΜL (ref 0.17–1.22)
MONOCYTES NFR BLD AUTO: 8 % (ref 4–12)
NEUTROPHILS # BLD AUTO: 6.22 THOUSANDS/ÂΜL (ref 1.85–7.62)
NEUTS SEG NFR BLD AUTO: 61 % (ref 43–75)
NRBC BLD AUTO-RTO: 0 /100 WBCS
PLATELET # BLD AUTO: 342 THOUSANDS/UL (ref 149–390)
PMV BLD AUTO: 9.8 FL (ref 8.9–12.7)
POTASSIUM SERPL-SCNC: 4 MMOL/L (ref 3.5–5.3)
PROT SERPL-MCNC: 7.8 G/DL (ref 6.4–8.4)
RBC # BLD AUTO: 4.59 MILLION/UL (ref 3.81–5.12)
SL AMB POCT HEMOGLOBIN AIC: 7.1 (ref ?–6.5)
SL AMB POCT UR MICROALBUMIN: 80
SODIUM SERPL-SCNC: 139 MMOL/L (ref 135–147)
TSH SERPL DL<=0.05 MIU/L-ACNC: 2.6 UIU/ML (ref 0.45–4.5)
WBC # BLD AUTO: 10.17 THOUSAND/UL (ref 4.31–10.16)

## 2023-11-28 PROCEDURE — 99285 EMERGENCY DEPT VISIT HI MDM: CPT | Performed by: EMERGENCY MEDICINE

## 2023-11-28 PROCEDURE — 99285 EMERGENCY DEPT VISIT HI MDM: CPT

## 2023-11-28 PROCEDURE — 82570 ASSAY OF URINE CREATININE: CPT | Performed by: FAMILY MEDICINE

## 2023-11-28 PROCEDURE — 80053 COMPREHEN METABOLIC PANEL: CPT

## 2023-11-28 PROCEDURE — 84484 ASSAY OF TROPONIN QUANT: CPT

## 2023-11-28 PROCEDURE — 93000 ELECTROCARDIOGRAM COMPLETE: CPT | Performed by: FAMILY MEDICINE

## 2023-11-28 PROCEDURE — 99214 OFFICE O/P EST MOD 30 MIN: CPT | Performed by: FAMILY MEDICINE

## 2023-11-28 PROCEDURE — 82043 UR ALBUMIN QUANTITATIVE: CPT | Performed by: FAMILY MEDICINE

## 2023-11-28 PROCEDURE — 71045 X-RAY EXAM CHEST 1 VIEW: CPT

## 2023-11-28 PROCEDURE — 85025 COMPLETE CBC W/AUTO DIFF WBC: CPT

## 2023-11-28 PROCEDURE — 93005 ELECTROCARDIOGRAM TRACING: CPT

## 2023-11-28 PROCEDURE — 83036 HEMOGLOBIN GLYCOSYLATED A1C: CPT | Performed by: FAMILY MEDICINE

## 2023-11-28 PROCEDURE — 36415 COLL VENOUS BLD VENIPUNCTURE: CPT

## 2023-11-28 PROCEDURE — 84443 ASSAY THYROID STIM HORMONE: CPT

## 2023-11-28 RX ORDER — SODIUM CHLORIDE 9 MG/ML
3 INJECTION INTRAVENOUS
Status: DISCONTINUED | OUTPATIENT
Start: 2023-11-28 | End: 2023-11-28 | Stop reason: HOSPADM

## 2023-11-28 RX ORDER — BLOOD SUGAR DIAGNOSTIC
STRIP MISCELLANEOUS
Qty: 100 EACH | Refills: 3 | Status: SHIPPED | OUTPATIENT
Start: 2023-11-28

## 2023-11-28 RX ORDER — LANCETS 33 GAUGE
EACH MISCELLANEOUS
Qty: 100 EACH | Refills: 3 | Status: SHIPPED | OUTPATIENT
Start: 2023-11-28

## 2023-11-28 RX ORDER — BLOOD-GLUCOSE METER
KIT MISCELLANEOUS
Qty: 1 KIT | Refills: 0 | Status: SHIPPED | OUTPATIENT
Start: 2023-11-28

## 2023-11-28 RX ORDER — FLUTICASONE PROPIONATE 50 MCG
1 SPRAY, SUSPENSION (ML) NASAL DAILY
Qty: 16 G | Refills: 1 | Status: SHIPPED | OUTPATIENT
Start: 2023-11-28

## 2023-11-28 NOTE — TELEPHONE ENCOUNTER
Patient notified and verbally understood your recommendations - she is going to try and find someone to take care of her mom and will go to the ER as soon as she can.

## 2023-11-28 NOTE — DISCHARGE INSTRUCTIONS
You apparently had a run of atrial flutter today while in the office at your family practice doctor. This appeared to be symptomatic based on your description. Please  a Holter monitor and wear to evaluate further for these episodes of palpitations and lightheadedness that you have been experiencing. If you have new episode please come to the emergency department for evaluation. As we discussed, if you are continuing to have atrial rhythm you will need to have a discussion about possible anticoagulation with your family doctor or else you could be at high risk for stroke and heart attack.

## 2023-11-28 NOTE — TELEPHONE ENCOUNTER
Please let pt know I spoke with Cardiology who reviewed her EKG and agrees that the best option for her would be to go to the ER as the heart rhythm she has may cause heart attack, heart failure, stroke if untreated. I know she is taking care of her Mom, but if she could find any way to have someone take her place, that would be the best option.

## 2023-11-29 ENCOUNTER — TELEPHONE (OUTPATIENT)
Dept: SURGERY | Facility: CLINIC | Age: 68
End: 2023-11-29

## 2023-11-29 LAB
ATRIAL RATE: 66 BPM
ATRIAL RATE: 79 BPM
P AXIS: 61 DEGREES
P AXIS: 80 DEGREES
PR INTERVAL: 158 MS
PR INTERVAL: 164 MS
QRS AXIS: 45 DEGREES
QRS AXIS: 55 DEGREES
QRSD INTERVAL: 86 MS
QRSD INTERVAL: 88 MS
QT INTERVAL: 386 MS
QT INTERVAL: 424 MS
QTC INTERVAL: 442 MS
QTC INTERVAL: 444 MS
T WAVE AXIS: 69 DEGREES
T WAVE AXIS: 81 DEGREES
VENTRICULAR RATE: 66 BPM
VENTRICULAR RATE: 79 BPM

## 2023-11-29 NOTE — ED ATTENDING ATTESTATION
11/28/2023  Walker Galaviz DO, saw and evaluated the patient. I have discussed the patient with the resident/non-physician practitioner and agree with the resident's/non-physician practitioner's findings, Plan of Care, and MDM as documented in the resident's/non-physician practitioner's note, except where noted. All available labs and Radiology studies were reviewed. I was present for key portions of any procedure(s) performed by the resident/non-physician practitioner and I was immediately available to provide assistance. At this point I agree with the current assessment done in the Emergency Department. I have conducted an independent evaluation of this patient a history and physical is as follows:    Patient is a 61-year-old female presents for evaluation of palpitations. Patient was at her family doctor earlier today when the symptoms started. It was found that her heart rate was in the 150s. They sent her here for evaluation. The patient denies any complaints at this time. She says she did have some chest discomfort with the palpitations and some mild lightheadedness. She says she has never had this before. Physical Exam  Vitals and nursing note reviewed. Constitutional:       General: She is not in acute distress. Appearance: She is well-developed. HENT:      Head: Normocephalic and atraumatic. Right Ear: External ear normal.      Left Ear: External ear normal.      Nose: Nose normal.      Mouth/Throat:      Mouth: Mucous membranes are moist.      Pharynx: No oropharyngeal exudate. Eyes:      Conjunctiva/sclera: Conjunctivae normal.      Pupils: Pupils are equal, round, and reactive to light. Cardiovascular:      Rate and Rhythm: Normal rate and regular rhythm. Heart sounds: Normal heart sounds. No murmur heard. No friction rub. No gallop. Pulmonary:      Effort: Pulmonary effort is normal. No respiratory distress. Breath sounds: Normal breath sounds.  No wheezing or rales. Abdominal:      General: There is no distension. Palpations: Abdomen is soft. Tenderness: There is no abdominal tenderness. There is no guarding. Musculoskeletal:         General: No swelling, tenderness or deformity. Normal range of motion. Cervical back: Normal range of motion and neck supple. Lymphadenopathy:      Cervical: No cervical adenopathy. Skin:     General: Skin is warm and dry. Neurological:      General: No focal deficit present. Mental Status: She is alert and oriented to person, place, and time. Mental status is at baseline. Cranial Nerves: No cranial nerve deficit. Sensory: No sensory deficit. Motor: No weakness or abnormal muscle tone. Coordination: Coordination normal.        57-year-old female presenting for palpitations. Occurred while she was in the family doctor's office. Last about 15 minutes. Has some mild chest pain with it. No symptoms at this time. Has not had any more events of palpitations. Vitals are within normal limits. EKG shows normal sinus rhythm. Chest discomfort will obtain cardiac workup. Labs within normal limits. Troponin negative x 2. Patient remained in normal sinus rhythm while in the department.   Patient was given a referral for cardiology as well as an order for an Holter monitor    ED Course         Critical Care Time  Procedures

## 2023-11-29 NOTE — TELEPHONE ENCOUNTER
Called pt because I noticed she canceled her appt for 12/13/23 which was for a f/u after her c-scope on 11/21/23. Patient states she canceled her appt due to going to the ER yesterday for complications with her heart and has to wear a Holter monitor and make an appt with cardiology. Patient would like for David Joyner to call her with the results of her c-scope.

## 2023-11-30 ENCOUNTER — TELEPHONE (OUTPATIENT)
Dept: FAMILY MEDICINE CLINIC | Facility: CLINIC | Age: 68
End: 2023-11-30

## 2023-11-30 NOTE — TELEPHONE ENCOUNTER
Reviewed pt's ER visit and saw that she went back into a normal heart rhythm on her own, which is good. It looks like they ordered a holter monitor for her -- was she able to schedule that or would she like to have one placed in our office?

## 2023-11-30 NOTE — TELEPHONE ENCOUNTER
Pt was not able to get appt for holter until 12/20 and was advised to see you prior to making appt with cardiology by ED doctor.

## 2023-12-01 ENCOUNTER — CLINICAL SUPPORT (OUTPATIENT)
Dept: FAMILY MEDICINE CLINIC | Facility: CLINIC | Age: 68
End: 2023-12-01
Payer: COMMERCIAL

## 2023-12-01 ENCOUNTER — PATIENT MESSAGE (OUTPATIENT)
Dept: FAMILY MEDICINE CLINIC | Facility: CLINIC | Age: 68
End: 2023-12-01

## 2023-12-01 DIAGNOSIS — R42 DIZZINESS: Primary | ICD-10-CM

## 2023-12-01 DIAGNOSIS — R00.2 PALPITATIONS: ICD-10-CM

## 2023-12-01 LAB

## 2023-12-01 PROCEDURE — 93242 EXT ECG>48HR<7D RECORDING: CPT | Performed by: FAMILY MEDICINE

## 2023-12-01 NOTE — TELEPHONE ENCOUNTER
Left message on machine for the patient to call back and schedule the nurse's visit to place her Holter monitor for 5 days. Voice mail also advised that if the patient's symptoms returned to go be evaluated at the ED.

## 2023-12-01 NOTE — TELEPHONE ENCOUNTER
Let's have her come in for nurse visit to place holter for 5 days. If her symptoms recur, should go to ED.

## 2023-12-05 DIAGNOSIS — F51.01 PRIMARY INSOMNIA: ICD-10-CM

## 2023-12-05 RX ORDER — ZOLPIDEM TARTRATE 10 MG/1
10 TABLET ORAL
Qty: 7 TABLET | Refills: 0 | Status: SHIPPED | OUTPATIENT
Start: 2023-12-05 | End: 2023-12-12

## 2023-12-05 RX ORDER — ZOLPIDEM TARTRATE 10 MG/1
10 TABLET ORAL
Qty: 90 TABLET | Refills: 1 | Status: SHIPPED | OUTPATIENT
Start: 2023-12-05 | End: 2023-12-07 | Stop reason: SDUPTHER

## 2023-12-06 ENCOUNTER — CLINICAL SUPPORT (OUTPATIENT)
Dept: FAMILY MEDICINE CLINIC | Facility: CLINIC | Age: 68
End: 2023-12-06
Payer: COMMERCIAL

## 2023-12-06 DIAGNOSIS — R00.2 PALPITATIONS: Primary | ICD-10-CM

## 2023-12-06 PROCEDURE — 93244 EXT ECG>48HR<7D REV&INTERPJ: CPT | Performed by: FAMILY MEDICINE

## 2023-12-07 ENCOUNTER — TELEPHONE (OUTPATIENT)
Dept: FAMILY MEDICINE CLINIC | Facility: CLINIC | Age: 68
End: 2023-12-07

## 2023-12-07 DIAGNOSIS — F51.01 PRIMARY INSOMNIA: ICD-10-CM

## 2023-12-07 DIAGNOSIS — H04.129 DRY EYE: Primary | ICD-10-CM

## 2023-12-07 DIAGNOSIS — I47.10 SVT (SUPRAVENTRICULAR TACHYCARDIA): Primary | ICD-10-CM

## 2023-12-07 RX ORDER — ZOLPIDEM TARTRATE 10 MG/1
10 TABLET ORAL
Qty: 90 TABLET | Refills: 0 | Status: SHIPPED | OUTPATIENT
Start: 2023-12-07

## 2023-12-07 RX ORDER — CYCLOSPORINE 0.5 MG/ML
1 EMULSION OPHTHALMIC 2 TIMES DAILY
Qty: 5.5 ML | Refills: 1 | Status: SHIPPED | OUTPATIENT
Start: 2023-12-07

## 2023-12-07 NOTE — TELEPHONE ENCOUNTER
Need new prescription sent to her [facility]. [facility]   Instill 1 drop in each twice a day.  90 day supply

## 2023-12-07 NOTE — TELEPHONE ENCOUNTER
Please let pt know her holter monitor showed multiple episodes of abnormal heart rhythm (called SVT) -- I would suggest she schedule a consult with Cardiology to discuss next steps. I put in a referral for her. If she develops symptoms like she had in the office, should go back to ED.

## 2023-12-11 ENCOUNTER — HOSPITAL ENCOUNTER (OUTPATIENT)
Dept: MAMMOGRAPHY | Facility: HOSPITAL | Age: 68
Discharge: HOME | End: 2023-12-11
Payer: COMMERCIAL

## 2023-12-11 VITALS — BODY MASS INDEX: 36.83 KG/M2 | HEIGHT: 68 IN | WEIGHT: 243 LBS

## 2023-12-11 DIAGNOSIS — F51.01 PRIMARY INSOMNIA: ICD-10-CM

## 2023-12-11 DIAGNOSIS — Z12.31 ENCOUNTER FOR SCREENING MAMMOGRAM FOR MALIGNANT NEOPLASM OF BREAST: ICD-10-CM

## 2023-12-11 PROCEDURE — 77067 SCR MAMMO BI INCL CAD: CPT

## 2023-12-11 PROCEDURE — 77063 BREAST TOMOSYNTHESIS BI: CPT

## 2023-12-11 RX ORDER — ZOLPIDEM TARTRATE 10 MG/1
10 TABLET ORAL
Qty: 7 TABLET | Refills: 0 | Status: SHIPPED | OUTPATIENT
Start: 2023-12-11 | End: 2023-12-18

## 2023-12-20 DIAGNOSIS — M79.671 RIGHT FOOT PAIN: ICD-10-CM

## 2023-12-20 DIAGNOSIS — E53.8 B12 DEFICIENCY: ICD-10-CM

## 2023-12-21 RX ORDER — LANOLIN ALCOHOL/MO/W.PET/CERES
1000 CREAM (GRAM) TOPICAL DAILY
Qty: 90 TABLET | Refills: 1 | Status: SHIPPED | OUTPATIENT
Start: 2023-12-21

## 2023-12-21 RX ORDER — NAPROXEN 500 MG/1
500 TABLET ORAL 2 TIMES DAILY PRN
Qty: 180 TABLET | Refills: 1 | Status: SHIPPED | OUTPATIENT
Start: 2023-12-21

## 2024-02-09 DIAGNOSIS — I10 PRIMARY HYPERTENSION: ICD-10-CM

## 2024-02-09 RX ORDER — VALSARTAN 160 MG/1
160 TABLET ORAL DAILY
Qty: 90 TABLET | Refills: 3 | Status: SHIPPED | OUTPATIENT
Start: 2024-02-09

## 2024-02-10 DIAGNOSIS — F51.01 PRIMARY INSOMNIA: ICD-10-CM

## 2024-02-10 DIAGNOSIS — E53.8 B12 DEFICIENCY: ICD-10-CM

## 2024-02-12 RX ORDER — LANOLIN ALCOHOL/MO/W.PET/CERES
1000 CREAM (GRAM) TOPICAL DAILY
Qty: 90 TABLET | Refills: 1 | Status: SHIPPED | OUTPATIENT
Start: 2024-02-12

## 2024-02-12 RX ORDER — ZOLPIDEM TARTRATE 10 MG/1
10 TABLET ORAL
Qty: 90 TABLET | Refills: 0 | Status: SHIPPED | OUTPATIENT
Start: 2024-02-12

## 2024-02-20 ENCOUNTER — CONSULT (OUTPATIENT)
Dept: CARDIOLOGY CLINIC | Facility: CLINIC | Age: 69
End: 2024-02-20
Payer: COMMERCIAL

## 2024-02-20 VITALS
SYSTOLIC BLOOD PRESSURE: 136 MMHG | BODY MASS INDEX: 38.77 KG/M2 | OXYGEN SATURATION: 97 % | HEART RATE: 88 BPM | HEIGHT: 67 IN | WEIGHT: 247 LBS | DIASTOLIC BLOOD PRESSURE: 74 MMHG

## 2024-02-20 DIAGNOSIS — R94.31 ABNORMAL EKG: ICD-10-CM

## 2024-02-20 DIAGNOSIS — E78.1 PURE HYPERTRIGLYCERIDEMIA: Primary | ICD-10-CM

## 2024-02-20 DIAGNOSIS — R06.09 EXERTIONAL DYSPNEA: ICD-10-CM

## 2024-02-20 DIAGNOSIS — I47.10 SVT (SUPRAVENTRICULAR TACHYCARDIA): ICD-10-CM

## 2024-02-20 DIAGNOSIS — R00.2 PALPITATIONS: ICD-10-CM

## 2024-02-20 PROCEDURE — 99204 OFFICE O/P NEW MOD 45 MIN: CPT | Performed by: INTERNAL MEDICINE

## 2024-02-20 RX ORDER — CYCLOBENZAPRINE HCL 10 MG
10 TABLET ORAL 3 TIMES DAILY PRN
COMMUNITY
Start: 2024-02-20

## 2024-02-20 RX ORDER — DILTIAZEM HYDROCHLORIDE 180 MG/1
180 CAPSULE, COATED, EXTENDED RELEASE ORAL DAILY
Qty: 90 CAPSULE | Refills: 5 | Status: SHIPPED | OUTPATIENT
Start: 2024-02-20

## 2024-02-20 RX ORDER — DICLOFENAC SODIUM 75 MG/1
75 TABLET, DELAYED RELEASE ORAL 4 TIMES DAILY
COMMUNITY
Start: 2024-02-20 | End: 2025-02-19

## 2024-02-20 NOTE — ASSESSMENT & PLAN NOTE
1 spell at a heart rate of 130 for 15 minutes.  No other sustained spells.  I am going to check an echocardiogram and start diltiazem.  If there are recurrences of significant length will consider escalating treatment.

## 2024-02-20 NOTE — PROGRESS NOTES
Patient ID: Rosemarie Mace is a 68 y.o. female.        Plan:      SVT (supraventricular tachycardia)  1 spell at a heart rate of 130 for 15 minutes.  No other sustained spells.  I am going to check an echocardiogram and start diltiazem.  If there are recurrences of significant length will consider escalating treatment.    Primary hypertension  Reasonably well-controlled.    Exertional dyspnea  Most likely related to diminishing fitness but again an echo is pending.     Follow up Plan/Other summary comments:  Return in about 1 year (around 2/20/2025).  In the meantime exertion was encouraged and she will let me know if treatment is ineffective.    HPI:   Patient is seen today in consultation from  and [place] regarding tachycardia.  On 11/28/2023 while at her primary care provider's office the patient suddenly felt a feeling of fast heartbeats.  Symptoms lasted for 15 minutes.  EKG was consistent with SVT at a rate of 130 bpm.  She was told to go to the emergency room and infected that hours later but there the EKG was normal.  Both the onset and offset of this feeling of tachycardia was sudden.  She has had brief for spells since then but never sustained more than 1 minute.  There is no history of syncope or near syncope.  There is no family history of early sudden death.  1 family member had an early coronary event.    Patient has had mild stable progressive exertional dyspnea.  She acknowledges it may be related to being sedentary.            Most recent or relevant cardiac/vascular testing:    EKGs as described above.  5-day monitor in November 2023: Uneventful and there were no symptoms.      Past Surgical History:   Procedure Laterality Date    BLADDER SUSPENSION      CHOLECYSTECTOMY      COLONOSCOPY      CYSTOSCOPY N/A 01/23/2018    Procedure: CYSTOSCOPY;  Surgeon: Kyle Lowery MD;  [facility];  Service: Gynecology Oncology    EYE SURGERY      HYSTERECTOMY      JOINT REPLACEMENT  "Bilateral     CA HYSTEROSCOPY BX ENDOMETRIUM&/POLYPC W/WO D&C N/A 12/01/2017    Procedure: DILATATION AND CURETTAGE (D&C) WITH HYSTEROSCOPY; POLYPECTOMY;  Surgeon: Julio C Robin MD;  [facility];  Service: Gynecology    CA LAPS TOTAL HYSTERECT 250 GM/< W/RMVL TUBE/OVARY N/A 01/23/2018    Procedure: ROBOTIC ASSISTED TOTAL LAPAROSCOPIC HYSTERECTOMY; BILATERAL SALPINGOOPHERECTOMY;  Surgeon: Kyle Lowery MD;  [facility];  Service: Gynecology Oncology    REDUCTION MAMMAPLASTY Bilateral 18yrs ago    REPLACEMENT TOTAL KNEE BILATERAL      ROTATOR CUFF REPAIR Left     TUBAL LIGATION         Lipid Profile: Reviewed      Review of Systems   10  point ROS  was otherwise non pertinent or negative except as per HPI or as below.   Gait:  Normal.        Objective:     /74 (BP Location: Left arm, Patient Position: Sitting, Cuff Size: Large)   Pulse 88   Ht 5' 6.54\" (1.69 m)   Wt 112 kg (247 lb)   LMP  (LMP Unknown)   SpO2 97%   BMI 39.23 kg/m²     PHYSICAL EXAM:    General:  Normal appearance in no distress.  Eyes:  Anicteric.  Oral mucosa:  Moist.  Neck:  No JVD. Carotid upstrokes are brisk without bruits.  No masses.  Chest:  Clear to auscultation.  Cardiac:  No palpable PMI.  Normal S1 and S2.  No murmur gallop or rub.  Abdomen:  Soft and nontender. No palpable organomegaly or aortic enlargement.  Extremities:  No peripheral edema.  Musculoskeletal:  Symmetric.   Vascular:  Femoral pulses are brisk without bruits.  Popliteal pulses are intact bilaterally.   Pedal pulses are intact.  Neuro:  Grossly symmetric.  Psych:  Alert and oriented x3.      Meds reviewed.    Past Medical History:   Diagnosis Date    Bacterial vaginitis 10/08/2018    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    CPAP (continuous positive airway pressure) dependence     does not use machine    Diabetes (HCC)     DJD (degenerative joint disease)     Dyspareunia 06/16/2015    Gait abnormality     GERD " (gastroesophageal reflux disease)     History of transfusion     Hyperlipidemia     Hypertension     Obesity (BMI 35.0-39.9 without comorbidity)     Sleep apnea     Uterine polyp 12/01/2017           Social History     Tobacco Use   Smoking Status Never   Smokeless Tobacco Never

## 2024-02-28 ENCOUNTER — HOSPITAL ENCOUNTER (OUTPATIENT)
Dept: NON INVASIVE DIAGNOSTICS | Facility: CLINIC | Age: 69
Discharge: HOME | End: 2024-02-28
Payer: COMMERCIAL

## 2024-02-28 VITALS
HEART RATE: 71 BPM | WEIGHT: 247 LBS | BODY MASS INDEX: 39.7 KG/M2 | DIASTOLIC BLOOD PRESSURE: 74 MMHG | HEIGHT: 66 IN | SYSTOLIC BLOOD PRESSURE: 136 MMHG

## 2024-02-28 DIAGNOSIS — I47.10 SVT (SUPRAVENTRICULAR TACHYCARDIA): ICD-10-CM

## 2024-02-28 LAB
AORTIC ROOT: 2.9 CM
APICAL FOUR CHAMBER EJECTION FRACTION: 65 %
ASCENDING AORTA: 3 CM
BSA FOR ECHO PROCEDURE: 2.19 M2
E WAVE DECELERATION TIME: 190 MS
E/A RATIO: 0.9
FRACTIONAL SHORTENING: 31 (ref 28–44)
INTERVENTRICULAR SEPTUM IN DIASTOLE (PARASTERNAL SHORT AXIS VIEW): 1.2 CM
INTERVENTRICULAR SEPTUM: 1.2 CM (ref 0.6–1.1)
IVC: 18 MM
LAAS-AP4: 21.1 CM2
LEFT ATRIUM SIZE: 4.6 CM
LEFT INTERNAL DIMENSION IN SYSTOLE: 2.9 CM (ref 2.1–4)
LEFT VENTRICULAR INTERNAL DIMENSION IN DIASTOLE: 4.2 CM (ref 3.5–6)
LEFT VENTRICULAR POSTERIOR WALL IN END DIASTOLE: 1.2 CM
LEFT VENTRICULAR STROKE VOLUME: 46 ML
LVSV (TEICH): 46 ML
MV E'TISSUE VEL-SEP: 10 CM/S
MV PEAK A VEL: 0.72 M/S
MV PEAK E VEL: 65 CM/S
RIGHT ATRIUM AREA SYSTOLE A4C: 14.9 CM2
RIGHT VENTRICLE ID DIMENSION: 3.1 CM
SL CV LV EF: 60
SL CV PED ECHO LEFT VENTRICLE DIASTOLIC VOLUME (MOD BIPLANE) 2D: 80 ML
SL CV PED ECHO LEFT VENTRICLE SYSTOLIC VOLUME (MOD BIPLANE) 2D: 33 ML
TR MAX PG: 26 MMHG
TR PEAK VELOCITY: 2.6 M/S
TRICUSPID ANNULAR PLANE SYSTOLIC EXCURSION: 2.1 CM
TRICUSPID VALVE PEAK REGURGITATION VELOCITY: 2.56 M/S

## 2024-02-28 PROCEDURE — 93306 TTE W/DOPPLER COMPLETE: CPT

## 2024-02-28 PROCEDURE — 93306 TTE W/DOPPLER COMPLETE: CPT | Performed by: INTERNAL MEDICINE

## 2024-04-05 DIAGNOSIS — E78.1 PURE HYPERTRIGLYCERIDEMIA: ICD-10-CM

## 2024-04-05 RX ORDER — FENOFIBRATE 145 MG/1
145 TABLET, COATED ORAL DAILY
Qty: 90 TABLET | Refills: 1 | Status: SHIPPED | OUTPATIENT
Start: 2024-04-05

## 2024-04-23 PROBLEM — E11.9 TYPE 2 DIABETES MELLITUS WITHOUT COMPLICATION, WITHOUT LONG-TERM CURRENT USE OF INSULIN (HCC): Status: ACTIVE | Noted: 2024-04-23

## 2024-05-08 NOTE — PROGRESS NOTES
Rosemarie Mace 1955 female MRN: [id number]      ASSESSMENT/PLAN  Problem List Items Addressed This Visit          Cardiovascular and Mediastinum    Primary hypertension    Relevant Medications    valsartan (DIOVAN) 160 mg tablet       Digestive    Gastroesophageal reflux disease    Relevant Medications    omeprazole (PriLOSEC) 20 mg delayed release capsule       Endocrine    Type 2 diabetes mellitus without complication, without long-term current use of insulin (HCC) - Primary    Relevant Medications    Empagliflozin (JARDIANCE) 10 MG TABS tablet    Other Relevant Orders    POCT hemoglobin A1c (Completed)    IRIS Diabetic eye exam       Neurology/Sleep    Primary insomnia    Relevant Medications    zolpidem (AMBIEN) 10 mg tablet       Other    Vitamin D deficiency    Relevant Medications    Cholecalciferol (Vitamin D3) 125 MCG (5000 UT) TABS    Pure hypertriglyceridemia    Relevant Medications    fenofibrate (TRICOR) 145 mg tablet    Dizziness    Relevant Medications    meclizine (ANTIVERT) 25 mg tablet    B12 deficiency    Relevant Medications    vitamin B-12 (VITAMIN B-12) 1,000 mcg tablet     Other Visit Diagnoses       Post-nasal drip        Relevant Medications    fluticasone (FLONASE) 50 mcg/act nasal spray    Right foot pain        Relevant Medications    naproxen (NAPROSYN) 500 mg tablet    Healthcare maintenance        Postmenopausal estrogen deficiency        Relevant Orders    DXA bone density spine hip and pelvis    Screening for osteoporosis        Relevant Orders    DXA bone density spine hip and pelvis          DM: A1c 8.5% (from 7.1) -- may be secondary to recent steroid injections in her neck. Discussed monitoring vs trial of medication, pt agreeable to Jardiance -- reviewed possible ADRs including polyuria, UTI/yeast infection. Will f/u in 3 months to monitor.   HTN: Within goal   HLD: Update lipids prior to next visit     Health Maintenance:   Mammogram UTD  DEXA DUE -- encouraged to  "schedule   CRC UTD  Vaccinations: Pneumo UTD, TDap UTD, Shingles UTD, COVID completed primary + boosters  Encouraged regular physical activity, varied diet, and regular dental/eye exams         Future Appointments   Date Time Provider Department Center   5/10/2024 10:00 AM Terrance Velasquez DPM [facility]   2/24/2025 10:20 AM Malcolm Monk MD  [facility]          SUBJECTIVE  CC: Diabetes      HPI:  Rosemarie Mace is a 69 y.o. female who presents for chronic follow up and annual physical. History reviewed and updated as below.     DM: Home sugars none; denies hypoglycemic symptoms  HTN: Home BPs \"that's been good\"    HLD: On Tricor     Has been getting lightheaded -- wondering if she can increase her Meclizine to BID   Needs refills     Review of Systems   Constitutional:  Negative for diaphoresis.   HENT:  Positive for congestion, rhinorrhea and sneezing.    Eyes:  Positive for discharge (watery). Negative for visual disturbance.   Respiratory:  Negative for cough and shortness of breath.    Cardiovascular:  Negative for chest pain, palpitations and leg swelling.   Gastrointestinal:  Negative for abdominal pain, blood in stool, constipation and diarrhea.   Endocrine: Negative for polyuria.   Genitourinary:  Negative for dysuria, hematuria and vaginal bleeding.        Notes her stream sometimes starts and stops   Neurological:  Positive for light-headedness and headaches (\"a lot\" -- occipital; was told to try a different pillow). Negative for dizziness and tremors.   Psychiatric/Behavioral:  Positive for sleep disturbance (uses Ambien).        Historical Information   The patient history was reviewed and updated as follows:    Past Medical History:   Diagnosis Date    Bacterial vaginitis 10/08/2018    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    CPAP (continuous positive airway pressure) dependence     does not use machine    Diabetes (HCC)     DJD (degenerative joint " disease)     Dyspareunia 06/16/2015    Gait abnormality     GERD (gastroesophageal reflux disease)     History of transfusion     Hyperlipidemia     Hypertension     Obesity (BMI 35.0-39.9 without comorbidity)     Sleep apnea     Uterine polyp 12/01/2017     Past Surgical History:   Procedure Laterality Date    BLADDER SUSPENSION      CHOLECYSTECTOMY      COLONOSCOPY      CYSTOSCOPY N/A 01/23/2018    Procedure: CYSTOSCOPY;  Surgeon: Kyle Lowery MD;  Location: [facility];  Service: Gynecology Oncology    EYE SURGERY      HYSTERECTOMY      JOINT REPLACEMENT Bilateral     DC HYSTEROSCOPY BX ENDOMETRIUM&/POLYPC W/WO D&C N/A 12/01/2017    Procedure: DILATATION AND CURETTAGE (D&C) WITH HYSTEROSCOPY; POLYPECTOMY;  Surgeon: Julio C Robin MD;  Location: [facility];  Service: Gynecology    DC LAPS TOTAL HYSTERECT 250 GM/< W/RMVL TUBE/OVARY N/A 01/23/2018    Procedure: ROBOTIC ASSISTED TOTAL LAPAROSCOPIC HYSTERECTOMY; BILATERAL SALPINGOOPHERECTOMY;  Surgeon: Kyle Lowery MD;  Location: [facility];  Service: Gynecology Oncology    REDUCTION MAMMAPLASTY Bilateral 18yrs ago    REPLACEMENT TOTAL KNEE BILATERAL      ROTATOR CUFF REPAIR Left     TUBAL LIGATION       Family History   Problem Relation Age of Onset    No Known Problems Mother     Emphysema Father     Bone cancer Sister     Lung cancer Sister     Brain cancer Sister     No Known Problems Sister     No Known Problems Sister     No Known Problems Sister     No Known Problems Daughter     Breast cancer Maternal Grandmother 80    No Known Problems Maternal Aunt     No Known Problems Maternal Aunt     No Known Problems Paternal Aunt     No Known Problems Paternal Aunt     No Known Problems Paternal Aunt     No Known Problems Paternal Aunt     Ovarian cancer Neg Hx     Colon cancer Neg Hx       Social History   Social History     Substance and Sexual Activity   Alcohol Use No     Social History     Substance and Sexual Activity   Drug Use No     Social History      Tobacco Use   Smoking Status Never   Smokeless Tobacco Never       Medications:     Current Outpatient Medications:     Cholecalciferol (Vitamin D3) 125 MCG (5000 UT) TABS, Take 1 tablet (5,000 Units total) by mouth daily, Disp: 90 tablet, Rfl: 1    Empagliflozin (JARDIANCE) 10 MG TABS tablet, Take 1 tablet (10 mg total) by mouth daily, Disp: 90 tablet, Rfl: 1    fenofibrate (TRICOR) 145 mg tablet, Take 1 tablet (145 mg total) by mouth daily, Disp: 90 tablet, Rfl: 1    fluticasone (FLONASE) 50 mcg/act nasal spray, 1 spray into each nostril daily, Disp: 16 g, Rfl: 1    meclizine (ANTIVERT) 25 mg tablet, Take 1 tablet (25 mg total) by mouth 3 (three) times a day as needed for dizziness, Disp: 180 tablet, Rfl: 1    naproxen (NAPROSYN) 500 mg tablet, Take 1 tablet (500 mg total) by mouth 2 (two) times a day as needed for mild pain, Disp: 180 tablet, Rfl: 1    omeprazole (PriLOSEC) 20 mg delayed release capsule, Take 1 capsule (20 mg total) by mouth daily, Disp: 90 capsule, Rfl: 1    valsartan (DIOVAN) 160 mg tablet, Take 1 tablet (160 mg total) by mouth daily, Disp: 90 tablet, Rfl: 1    vitamin B-12 (VITAMIN B-12) 1,000 mcg tablet, Take 1 tablet (1,000 mcg total) by mouth daily, Disp: 90 tablet, Rfl: 1    zolpidem (AMBIEN) 10 mg tablet, Take 1 tablet (10 mg total) by mouth daily at bedtime, Disp: 90 tablet, Rfl: 0    Blood Glucose Monitoring Suppl (OneTouch Verio Reflect) w/Device KIT, Check blood sugars once daily. Please substitute with appropriate alternative as covered by patient's insurance. Dx: E11.65, Disp: 1 kit, Rfl: 0    ciclopirox (PENLAC) 8 % solution, Apply as directed, Disp: , Rfl:     cyclobenzaprine (FLEXERIL) 10 mg tablet, Take 10 mg by mouth Three times daily as needed for muscle spasms, Disp: , Rfl:     cycloSPORINE (RESTASIS) 0.05 % ophthalmic emulsion, Administer 1 drop to both eyes 2 (two) times a day, Disp: 5.5 mL, Rfl: 1    diclofenac (VOLTAREN) 75 mg EC tablet, Take 75 mg by mouth 4 (four)  "times a day, Disp: , Rfl:     diltiazem (CARDIZEM CD) 180 mg 24 hr capsule, Take 1 capsule (180 mg total) by mouth daily, Disp: 90 capsule, Rfl: 5    estradiol (ESTRACE) 0.1 mg/g vaginal cream, APPLY 2 TO 3 TIMES PER WEEK, Disp: 42.5 g, Rfl: 2    glucose blood (OneTouch Verio) test strip, Check blood sugars once daily. Please substitute with appropriate alternative as covered by patient's insurance. Dx: E11.65, Disp: 100 each, Rfl: 3    Lutein 20 MG CAPS, Take 1 capsule by mouth daily, Disp: , Rfl:     OneTouch Delica Lancets 33G MISC, Check blood sugars once daily. Please substitute with appropriate alternative as covered by patient's insurance. Dx: E11.65, Disp: 100 each, Rfl: 3    Propylene Glycol 0.6 % SOLN, Apply 1 drop to eye, Disp: , Rfl:   Allergies   Allergen Reactions    Doxycycline Hives       OBJECTIVE    Vitals:   Vitals:    05/09/24 0944   BP: 124/80   BP Location: Left arm   Patient Position: Sitting   Cuff Size: Large   Pulse: 84   Temp: 98.2 °F (36.8 °C)   SpO2: 96%   Weight: 111 kg (245 lb)   Height: 5' 6\" (1.676 m)           Physical Exam  Vitals and nursing note reviewed.   Constitutional:       General: She is not in acute distress.     Appearance: Normal appearance.   HENT:      Head: Normocephalic and atraumatic.      Right Ear: Tympanic membrane, ear canal and external ear normal.      Left Ear: Tympanic membrane, ear canal and external ear normal.      Nose: Nose normal.      Mouth/Throat:      Mouth: Mucous membranes are moist.      Pharynx: No oropharyngeal exudate or posterior oropharyngeal erythema.   Eyes:      Conjunctiva/sclera: Conjunctivae normal.   Cardiovascular:      Rate and Rhythm: Normal rate and regular rhythm.   Pulmonary:      Effort: Pulmonary effort is normal. No respiratory distress.      Breath sounds: Normal breath sounds.   Abdominal:      General: Bowel sounds are normal. There is no distension.      Palpations: Abdomen is soft.      Tenderness: There is no abdominal " tenderness.   Musculoskeletal:      Right lower leg: No edema.      Left lower leg: No edema.   Lymphadenopathy:      Cervical: No cervical adenopathy.   Skin:     General: Skin is warm and dry.   Neurological:      Mental Status: She is alert.      Comments: Grossly intact   Psychiatric:         Mood and Affect: Mood normal.                    Lena De La Rosa DO  [facility]   5/9/2024  10:08 AM

## 2024-05-09 ENCOUNTER — OFFICE VISIT (OUTPATIENT)
Dept: FAMILY MEDICINE CLINIC | Facility: CLINIC | Age: 69
End: 2024-05-09
Payer: COMMERCIAL

## 2024-05-09 VITALS
HEART RATE: 84 BPM | OXYGEN SATURATION: 96 % | HEIGHT: 66 IN | BODY MASS INDEX: 39.37 KG/M2 | WEIGHT: 245 LBS | SYSTOLIC BLOOD PRESSURE: 124 MMHG | DIASTOLIC BLOOD PRESSURE: 80 MMHG | TEMPERATURE: 98.2 F

## 2024-05-09 DIAGNOSIS — M79.671 RIGHT FOOT PAIN: ICD-10-CM

## 2024-05-09 DIAGNOSIS — E78.1 PURE HYPERTRIGLYCERIDEMIA: ICD-10-CM

## 2024-05-09 DIAGNOSIS — Z00.00 HEALTHCARE MAINTENANCE: ICD-10-CM

## 2024-05-09 DIAGNOSIS — I10 PRIMARY HYPERTENSION: ICD-10-CM

## 2024-05-09 DIAGNOSIS — Z13.820 SCREENING FOR OSTEOPOROSIS: ICD-10-CM

## 2024-05-09 DIAGNOSIS — R09.82 POST-NASAL DRIP: ICD-10-CM

## 2024-05-09 DIAGNOSIS — E53.8 B12 DEFICIENCY: ICD-10-CM

## 2024-05-09 DIAGNOSIS — K21.9 GASTROESOPHAGEAL REFLUX DISEASE, UNSPECIFIED WHETHER ESOPHAGITIS PRESENT: ICD-10-CM

## 2024-05-09 DIAGNOSIS — R42 DIZZINESS: ICD-10-CM

## 2024-05-09 DIAGNOSIS — E11.9 TYPE 2 DIABETES MELLITUS WITHOUT COMPLICATION, WITHOUT LONG-TERM CURRENT USE OF INSULIN (HCC): Primary | ICD-10-CM

## 2024-05-09 DIAGNOSIS — E55.9 VITAMIN D DEFICIENCY: ICD-10-CM

## 2024-05-09 DIAGNOSIS — F51.01 PRIMARY INSOMNIA: ICD-10-CM

## 2024-05-09 DIAGNOSIS — Z78.0 POSTMENOPAUSAL ESTROGEN DEFICIENCY: ICD-10-CM

## 2024-05-09 LAB — SL AMB POCT HEMOGLOBIN AIC: 8.5 (ref ?–6.5)

## 2024-05-09 PROCEDURE — 99397 PER PM REEVAL EST PAT 65+ YR: CPT | Performed by: FAMILY MEDICINE

## 2024-05-09 PROCEDURE — 99214 OFFICE O/P EST MOD 30 MIN: CPT | Performed by: FAMILY MEDICINE

## 2024-05-09 PROCEDURE — 83036 HEMOGLOBIN GLYCOSYLATED A1C: CPT | Performed by: FAMILY MEDICINE

## 2024-05-09 RX ORDER — OMEPRAZOLE 20 MG/1
20 CAPSULE, DELAYED RELEASE ORAL DAILY
Qty: 90 CAPSULE | Refills: 1 | Status: SHIPPED | OUTPATIENT
Start: 2024-05-09

## 2024-05-09 RX ORDER — ZOLPIDEM TARTRATE 10 MG/1
10 TABLET ORAL
Qty: 90 TABLET | Refills: 0 | Status: SHIPPED | OUTPATIENT
Start: 2024-05-09

## 2024-05-09 RX ORDER — LANOLIN ALCOHOL/MO/W.PET/CERES
1000 CREAM (GRAM) TOPICAL DAILY
Qty: 90 TABLET | Refills: 1 | Status: SHIPPED | OUTPATIENT
Start: 2024-05-09

## 2024-05-09 RX ORDER — ZOLPIDEM TARTRATE 10 MG/1
10 TABLET ORAL
Qty: 7 TABLET | Refills: 0 | Status: CANCELLED | OUTPATIENT
Start: 2024-05-09 | End: 2024-05-16

## 2024-05-09 RX ORDER — FENOFIBRATE 145 MG/1
145 TABLET, COATED ORAL DAILY
Qty: 90 TABLET | Refills: 1 | Status: SHIPPED | OUTPATIENT
Start: 2024-05-09

## 2024-05-09 RX ORDER — VALSARTAN 160 MG/1
160 TABLET ORAL DAILY
Qty: 90 TABLET | Refills: 1 | Status: SHIPPED | OUTPATIENT
Start: 2024-05-09

## 2024-05-09 RX ORDER — FLUTICASONE PROPIONATE 50 MCG
1 SPRAY, SUSPENSION (ML) NASAL DAILY
Qty: 16 G | Refills: 1 | Status: SHIPPED | OUTPATIENT
Start: 2024-05-09

## 2024-05-09 RX ORDER — NAPROXEN 500 MG/1
500 TABLET ORAL 2 TIMES DAILY PRN
Qty: 180 TABLET | Refills: 1 | Status: SHIPPED | OUTPATIENT
Start: 2024-05-09

## 2024-05-09 RX ORDER — MECLIZINE HYDROCHLORIDE 25 MG/1
25 TABLET ORAL 3 TIMES DAILY PRN
Qty: 180 TABLET | Refills: 1 | Status: SHIPPED | OUTPATIENT
Start: 2024-05-09

## 2024-05-10 ENCOUNTER — OFFICE VISIT (OUTPATIENT)
Dept: PODIATRY | Facility: CLINIC | Age: 69
End: 2024-05-10
Payer: COMMERCIAL

## 2024-05-10 VITALS
HEART RATE: 70 BPM | BODY MASS INDEX: 39.67 KG/M2 | WEIGHT: 245.8 LBS | DIASTOLIC BLOOD PRESSURE: 73 MMHG | SYSTOLIC BLOOD PRESSURE: 112 MMHG

## 2024-05-10 DIAGNOSIS — B35.1 ONYCHOMYCOSIS: Primary | ICD-10-CM

## 2024-05-10 DIAGNOSIS — E11.9 TYPE 2 DIABETES MELLITUS WITHOUT COMPLICATION, WITHOUT LONG-TERM CURRENT USE OF INSULIN (HCC): ICD-10-CM

## 2024-05-10 PROCEDURE — 99213 OFFICE O/P EST LOW 20 MIN: CPT | Performed by: PODIATRIST

## 2024-05-10 NOTE — PROGRESS NOTES
Assessment/Plan:    No problem-specific Assessment & Plan notes found for this encounter.       Diagnoses and all orders for this visit:    Onychomycosis    Type 2 diabetes mellitus without complication, without long-term current use of insulin (HCC)        -She does have findings for at risk footcare  - I would not recommend toenail removal at this time, needs to have A1c lower  - RTC 3 mo for continued care  - continue penlac.   Subjective:      Patient ID: Rosemarie Mace is a 69 y.o. female.    Patient is awake and management of her left foot.  Nails.  Use Penlac no success.  Diabetic last A1c 8.5.  Recent, changing medications and being diligent about blood sugars but had steroids.  Once fungus gone, would be willing to have her toenails removed permanently        The following portions of the patient's history were reviewed and updated as appropriate: allergies, current medications, past family history, past medical history, past social history, past surgical history, and problem list.    Review of Systems   Constitutional:  Negative for chills and fever.   HENT:  Negative for ear pain and sore throat.    Eyes:  Negative for pain and visual disturbance.   Respiratory:  Negative for cough and shortness of breath.    Cardiovascular:  Negative for chest pain and palpitations.   Gastrointestinal:  Negative for abdominal pain and vomiting.   Genitourinary:  Negative for dysuria and hematuria.   Musculoskeletal:  Negative for arthralgias and back pain.   Skin:  Negative for color change and rash.   Neurological:  Negative for seizures and syncope.   All other systems reviewed and are negative.        Objective:      /73 (BP Location: Left arm, Patient Position: Sitting, Cuff Size: Large)   Pulse 70   Wt 111 kg (245 lb 12.8 oz)   LMP  (LMP Unknown)   BMI 39.67 kg/m²          Physical Exam  Skin:     Comments: Skin is shiny and atrophic, dry, decreased pedal pulses, plus 2 out of 4 pitting edema nails 1  through 5 fungal in nateure

## 2024-05-13 ENCOUNTER — TELEPHONE (OUTPATIENT)
Dept: FAMILY MEDICINE CLINIC | Facility: CLINIC | Age: 69
End: 2024-05-13

## 2024-08-01 DIAGNOSIS — N95.2 ATROPHIC VAGINITIS: ICD-10-CM

## 2024-08-01 RX ORDER — ESTRADIOL 0.1 MG/G
CREAM VAGINAL
Qty: 42.5 G | Refills: 2 | Status: SHIPPED | OUTPATIENT
Start: 2024-08-01

## 2024-08-12 PROBLEM — E11.69 HYPERLIPIDEMIA ASSOCIATED WITH TYPE 2 DIABETES MELLITUS  (HCC): Status: ACTIVE | Noted: 2023-01-13

## 2024-08-12 PROBLEM — I15.2 HYPERTENSION ASSOCIATED WITH DIABETES  (HCC): Status: ACTIVE | Noted: 2022-08-11

## 2024-08-12 PROBLEM — E78.5 HYPERLIPIDEMIA ASSOCIATED WITH TYPE 2 DIABETES MELLITUS  (HCC): Status: ACTIVE | Noted: 2023-01-13

## 2024-08-12 PROBLEM — E11.59 HYPERTENSION ASSOCIATED WITH DIABETES  (HCC): Status: ACTIVE | Noted: 2022-08-11

## 2024-08-12 NOTE — PROGRESS NOTES
Ambulatory Visit  Name: Rosemarie Mace      : 1955      MRN: [id number]  Encounter Provider: Lena De La Rosa DO  Encounter Date: 2024   Encounter department: [facility]    Assessment & Plan   1. Type 2 diabetes mellitus without complication, without long-term current use of insulin (HCC)  Assessment & Plan:  Poorly controlled, likely due to multiple cortisone shots. Pt did not previously receive Jardiance we had decided to start at last visit (unclear if prior auth was complete). Given 10 mg samples in office today and will start prior auth for 25 mg dosing. Reviewed possible ADRs including yeast infection/UTI symptoms again. Will f/u in 3 months to monitor, to call if not tolerating prior. Encouraged to avoid further steroid injections if possible.   Foot exam as below   Pt going to schedule eye exam with [facility]   Lab Results   Component Value Date    HGBA1C 9.4 (A) 2024     Orders:  -     POCT hemoglobin A1c  -     Empagliflozin (JARDIANCE) 10 MG TABS tablet; Take 1 tablet (10 mg total) by mouth daily  -     Empagliflozin 25 MG TABS; Take 1 tablet (25 mg total) by mouth daily  2. Hypertension associated with diabetes  (HCC)  Assessment & Plan:  Within goal in office and at home, continue current regimen   3. Hyperlipidemia associated with type 2 diabetes mellitus  (HCC)  Assessment & Plan:  Encouraged to update lipids as previously ordered   Lab Results   Component Value Date    HGBA1C 9.4 (A) 2024     4. SVT (supraventricular tachycardia)  Assessment & Plan:  Asymptomatic on current regimen, continue and f/u with cardio as scheduled   5. Dysphagia, unspecified type  Assessment & Plan:  Will refer to GI to discuss possible EGD to further evaluate   Orders:  -     Ambulatory referral to Gastroenterology; Future       History of Present Illness     HPI    Pt presents for chronic follow up     DM: Started Jardiance at last visit, but pt states she never received  "it; no home sugars, no hypoglycemic symptoms. Pt notes she had two cortisone injections in each shoulder since our last visit  HTN: Home BPs \"not bad\"   HLD: On Tricor   SVT: No palpitations on Cardizem     Pt is scheduled for MRI to further evaluate continued neck pain     Review of Systems   Constitutional:  Negative for diaphoresis.   HENT:  Positive for trouble swallowing (pt notes it feels like everything gets \"stuck\" when swallowing).    Eyes:  Positive for visual disturbance.   Respiratory:  Negative for cough and shortness of breath.    Cardiovascular:  Negative for chest pain, palpitations and leg swelling.   Gastrointestinal:  Negative for abdominal pain, blood in stool, constipation and diarrhea (generally soft).   Endocrine: Negative for polyuria.   Genitourinary:  Negative for dysuria and hematuria.   Musculoskeletal:  Positive for neck pain.   Neurological:  Negative for dizziness, tremors and headaches.   Psychiatric/Behavioral:  Negative for sleep disturbance.      Medical History Reviewed by provider this encounter:  Tobacco  Allergies  Meds  Problems  Med Hx  Surg Hx  Fam Hx       Objective     /68   Pulse 76   Temp 98.9 °F (37.2 °C)   Ht 5' 6\" (1.676 m)   Wt 108 kg (238 lb 12.8 oz)   LMP  (LMP Unknown)   SpO2 97%   BMI 38.54 kg/m²     Physical Exam  Vitals and nursing note reviewed.   Constitutional:       General: She is not in acute distress.     Appearance: She is well-developed.   HENT:      Head: Normocephalic and atraumatic.      Right Ear: Tympanic membrane, ear canal and external ear normal.      Left Ear: Tympanic membrane, ear canal and external ear normal.      Nose: Nose normal. No rhinorrhea.      Mouth/Throat:      Mouth: Mucous membranes are moist.      Pharynx: No oropharyngeal exudate or posterior oropharyngeal erythema.   Eyes:      Conjunctiva/sclera: Conjunctivae normal.   Neck:      Thyroid: No thyromegaly.   Cardiovascular:      Rate and Rhythm: Normal " rate and regular rhythm.      Pulses: no weak pulses.           Dorsalis pedis pulses are 2+ on the right side and 2+ on the left side.   Pulmonary:      Effort: Pulmonary effort is normal. No respiratory distress.      Breath sounds: Normal breath sounds.   Abdominal:      General: Bowel sounds are normal. There is no distension.      Palpations: Abdomen is soft.      Tenderness: There is no abdominal tenderness.   Musculoskeletal:      Right lower leg: No edema.      Left lower leg: No edema.   Feet:      Right foot:      Skin integrity: Callus and dry skin present.      Left foot:      Skin integrity: Callus and dry skin present.   Lymphadenopathy:      Cervical: No cervical adenopathy.   Skin:     General: Skin is warm and dry.   Neurological:      Mental Status: She is alert.      Comments: Grossly intact   Psychiatric:         Mood and Affect: Mood normal.         Patient's shoes and socks removed.    Right Foot/Ankle   Right Foot Inspection  Skin Exam: dry skin, callus and callus.     Toe Exam: ROM and strength within normal limits.  no right toe deformity    Sensory   Vibration: intact  Monofilament testing: diminished    Vascular  Capillary refills: < 3 seconds  The right DP pulse is 2+.     Left Foot/Ankle  Left Foot Inspection  Skin Exam: dry skin and callus.     Toe Exam: ROM and strength within normal limits. No left toe deformity.     Sensory   Vibration: intact  Monofilament testing: diminished    Vascular  Capillary refills: < 3 seconds  The left DP pulse is 2+.     Assign Risk Category  No deformity present  Loss of protective sensation  No weak pulses  Risk: 1        Administrative Statements

## 2024-08-13 ENCOUNTER — OFFICE VISIT (OUTPATIENT)
Dept: FAMILY MEDICINE CLINIC | Facility: CLINIC | Age: 69
End: 2024-08-13
Payer: COMMERCIAL

## 2024-08-13 VITALS
BODY MASS INDEX: 38.38 KG/M2 | WEIGHT: 238.8 LBS | SYSTOLIC BLOOD PRESSURE: 130 MMHG | HEIGHT: 66 IN | OXYGEN SATURATION: 97 % | TEMPERATURE: 98.9 F | HEART RATE: 76 BPM | DIASTOLIC BLOOD PRESSURE: 68 MMHG

## 2024-08-13 DIAGNOSIS — E11.9 TYPE 2 DIABETES MELLITUS WITHOUT COMPLICATION, WITHOUT LONG-TERM CURRENT USE OF INSULIN (HCC): Primary | ICD-10-CM

## 2024-08-13 DIAGNOSIS — E11.69 HYPERLIPIDEMIA ASSOCIATED WITH TYPE 2 DIABETES MELLITUS  (HCC): ICD-10-CM

## 2024-08-13 DIAGNOSIS — I47.10 SVT (SUPRAVENTRICULAR TACHYCARDIA): ICD-10-CM

## 2024-08-13 DIAGNOSIS — E11.59 HYPERTENSION ASSOCIATED WITH DIABETES  (HCC): ICD-10-CM

## 2024-08-13 DIAGNOSIS — R13.10 DYSPHAGIA, UNSPECIFIED TYPE: ICD-10-CM

## 2024-08-13 DIAGNOSIS — E78.5 HYPERLIPIDEMIA ASSOCIATED WITH TYPE 2 DIABETES MELLITUS  (HCC): ICD-10-CM

## 2024-08-13 DIAGNOSIS — I15.2 HYPERTENSION ASSOCIATED WITH DIABETES  (HCC): ICD-10-CM

## 2024-08-13 LAB — SL AMB POCT HEMOGLOBIN AIC: 9.4 (ref ?–6.5)

## 2024-08-13 PROCEDURE — 99214 OFFICE O/P EST MOD 30 MIN: CPT | Performed by: FAMILY MEDICINE

## 2024-08-13 PROCEDURE — 83036 HEMOGLOBIN GLYCOSYLATED A1C: CPT | Performed by: FAMILY MEDICINE

## 2024-08-13 NOTE — ASSESSMENT & PLAN NOTE
Encouraged to update lipids as previously ordered   Lab Results   Component Value Date    HGBA1C 9.4 (A) 08/13/2024

## 2024-08-13 NOTE — ASSESSMENT & PLAN NOTE
Poorly controlled, likely due to multiple cortisone shots. Pt did not previously receive Jardiance we had decided to start at last visit (unclear if prior auth was complete). Given 10 mg samples in office today and will start prior auth for 25 mg dosing. Reviewed possible ADRs including yeast infection/UTI symptoms again. Will f/u in 3 months to monitor, to call if not tolerating prior. Encouraged to avoid further steroid injections if possible.   Foot exam as below   Pt going to schedule eye exam with [facility]   Lab Results   Component Value Date    HGBA1C 9.4 (A) 08/13/2024

## 2024-08-17 DIAGNOSIS — M79.671 RIGHT FOOT PAIN: ICD-10-CM

## 2024-08-17 DIAGNOSIS — F51.01 PRIMARY INSOMNIA: ICD-10-CM

## 2024-08-17 DIAGNOSIS — R09.82 POST-NASAL DRIP: ICD-10-CM

## 2024-08-17 DIAGNOSIS — Q15.9 EYE ABNORMALITY: Primary | ICD-10-CM

## 2024-08-18 RX ORDER — FLUTICASONE PROPIONATE 50 MCG
1 SPRAY, SUSPENSION (ML) NASAL DAILY
Qty: 16 G | Refills: 1 | Status: SHIPPED | OUTPATIENT
Start: 2024-08-18

## 2024-08-19 ENCOUNTER — TELEPHONE (OUTPATIENT)
Age: 69
End: 2024-08-19

## 2024-08-19 DIAGNOSIS — E11.9 TYPE 2 DIABETES MELLITUS WITHOUT COMPLICATION, WITHOUT LONG-TERM CURRENT USE OF INSULIN (HCC): Primary | ICD-10-CM

## 2024-08-19 RX ORDER — ZOLPIDEM TARTRATE 10 MG/1
10 TABLET ORAL
Qty: 90 TABLET | Refills: 0 | Status: SHIPPED | OUTPATIENT
Start: 2024-08-19

## 2024-08-19 RX ORDER — SPIRONOLACTONE 25 MG
1 TABLET ORAL DAILY
Qty: 90 CAPSULE | Refills: 1 | Status: SHIPPED | OUTPATIENT
Start: 2024-08-19

## 2024-08-19 RX ORDER — NAPROXEN 500 MG/1
500 TABLET ORAL 2 TIMES DAILY PRN
Qty: 180 TABLET | Refills: 0 | Status: SHIPPED | OUTPATIENT
Start: 2024-08-19

## 2024-10-04 DIAGNOSIS — E53.8 B12 DEFICIENCY: ICD-10-CM

## 2024-10-04 RX ORDER — LANOLIN ALCOHOL/MO/W.PET/CERES
1000 CREAM (GRAM) TOPICAL DAILY
Qty: 90 TABLET | Refills: 1 | Status: SHIPPED | OUTPATIENT
Start: 2024-10-04

## 2024-10-29 DIAGNOSIS — E78.5 HYPERLIPIDEMIA ASSOCIATED WITH TYPE 2 DIABETES MELLITUS  (HCC): ICD-10-CM

## 2024-10-29 DIAGNOSIS — I47.10 SVT (SUPRAVENTRICULAR TACHYCARDIA) (HCC): Primary | ICD-10-CM

## 2024-10-29 DIAGNOSIS — E11.9 TYPE 2 DIABETES MELLITUS WITHOUT COMPLICATION, WITHOUT LONG-TERM CURRENT USE OF INSULIN (HCC): ICD-10-CM

## 2024-10-29 DIAGNOSIS — E53.8 B12 DEFICIENCY: ICD-10-CM

## 2024-10-29 DIAGNOSIS — E11.59 HYPERTENSION ASSOCIATED WITH DIABETES  (HCC): ICD-10-CM

## 2024-10-29 DIAGNOSIS — E55.9 VITAMIN D DEFICIENCY: ICD-10-CM

## 2024-10-29 DIAGNOSIS — E11.69 HYPERLIPIDEMIA ASSOCIATED WITH TYPE 2 DIABETES MELLITUS  (HCC): ICD-10-CM

## 2024-10-29 DIAGNOSIS — I15.2 HYPERTENSION ASSOCIATED WITH DIABETES  (HCC): ICD-10-CM

## 2024-10-30 ENCOUNTER — APPOINTMENT (OUTPATIENT)
Dept: LAB | Facility: CLINIC | Age: 69
End: 2024-10-30
Payer: COMMERCIAL

## 2024-10-30 DIAGNOSIS — K14.6 BURNING TONGUE: ICD-10-CM

## 2024-10-30 DIAGNOSIS — I47.10 SVT (SUPRAVENTRICULAR TACHYCARDIA) (HCC): ICD-10-CM

## 2024-10-30 DIAGNOSIS — E11.9 TYPE 2 DIABETES MELLITUS WITHOUT COMPLICATION, WITHOUT LONG-TERM CURRENT USE OF INSULIN (HCC): ICD-10-CM

## 2024-10-30 DIAGNOSIS — E78.5 HYPERLIPIDEMIA ASSOCIATED WITH TYPE 2 DIABETES MELLITUS  (HCC): ICD-10-CM

## 2024-10-30 DIAGNOSIS — E55.9 VITAMIN D DEFICIENCY: ICD-10-CM

## 2024-10-30 DIAGNOSIS — E11.59 HYPERTENSION ASSOCIATED WITH DIABETES  (HCC): ICD-10-CM

## 2024-10-30 DIAGNOSIS — E53.8 B12 DEFICIENCY: ICD-10-CM

## 2024-10-30 DIAGNOSIS — E11.69 HYPERLIPIDEMIA ASSOCIATED WITH TYPE 2 DIABETES MELLITUS  (HCC): ICD-10-CM

## 2024-10-30 DIAGNOSIS — I15.2 HYPERTENSION ASSOCIATED WITH DIABETES  (HCC): ICD-10-CM

## 2024-10-30 LAB
25(OH)D3 SERPL-MCNC: 62.5 NG/ML (ref 30–100)
ALBUMIN SERPL BCG-MCNC: 4.2 G/DL (ref 3.5–5)
ALP SERPL-CCNC: 47 U/L (ref 34–104)
ALT SERPL W P-5'-P-CCNC: 19 U/L (ref 7–52)
ANION GAP SERPL CALCULATED.3IONS-SCNC: 12 MMOL/L (ref 4–13)
AST SERPL W P-5'-P-CCNC: 25 U/L (ref 13–39)
BASOPHILS # BLD AUTO: 0.07 THOUSANDS/ΜL (ref 0–0.1)
BASOPHILS NFR BLD AUTO: 1 % (ref 0–1)
BILIRUB SERPL-MCNC: 0.43 MG/DL (ref 0.2–1)
BUN SERPL-MCNC: 18 MG/DL (ref 5–25)
CALCIUM SERPL-MCNC: 9.6 MG/DL (ref 8.4–10.2)
CHLORIDE SERPL-SCNC: 104 MMOL/L (ref 96–108)
CHOLEST SERPL-MCNC: 147 MG/DL
CO2 SERPL-SCNC: 26 MMOL/L (ref 21–32)
CREAT SERPL-MCNC: 0.86 MG/DL (ref 0.6–1.3)
CREAT UR-MCNC: 245.5 MG/DL
EOSINOPHIL # BLD AUTO: 0.34 THOUSAND/ΜL (ref 0–0.61)
EOSINOPHIL NFR BLD AUTO: 4 % (ref 0–6)
ERYTHROCYTE [DISTWIDTH] IN BLOOD BY AUTOMATED COUNT: 13.9 % (ref 11.6–15.1)
EST. AVERAGE GLUCOSE BLD GHB EST-MCNC: 163 MG/DL
FERRITIN SERPL-MCNC: 24 NG/ML (ref 11–307)
FOLATE SERPL-MCNC: 5 NG/ML
GFR SERPL CREATININE-BSD FRML MDRD: 69 ML/MIN/1.73SQ M
GLUCOSE P FAST SERPL-MCNC: 120 MG/DL (ref 65–99)
HBA1C MFR BLD: 7.3 %
HCT VFR BLD AUTO: 41.5 % (ref 34.8–46.1)
HDLC SERPL-MCNC: 32 MG/DL
HGB BLD-MCNC: 12.3 G/DL (ref 11.5–15.4)
IMM GRANULOCYTES # BLD AUTO: 0.01 THOUSAND/UL (ref 0–0.2)
IMM GRANULOCYTES NFR BLD AUTO: 0 % (ref 0–2)
IRON SATN MFR SERPL: 9 % (ref 15–50)
IRON SERPL-MCNC: 73 UG/DL (ref 50–212)
LDLC SERPL CALC-MCNC: 88 MG/DL (ref 0–100)
LYMPHOCYTES # BLD AUTO: 2.02 THOUSANDS/ΜL (ref 0.6–4.47)
LYMPHOCYTES NFR BLD AUTO: 25 % (ref 14–44)
MCH RBC QN AUTO: 27.9 PG (ref 26.8–34.3)
MCHC RBC AUTO-ENTMCNC: 29.6 G/DL (ref 31.4–37.4)
MCV RBC AUTO: 94 FL (ref 82–98)
MICROALBUMIN UR-MCNC: 24.8 MG/L
MICROALBUMIN/CREAT 24H UR: 10 MG/G CREATININE (ref 0–30)
MONOCYTES # BLD AUTO: 0.63 THOUSAND/ΜL (ref 0.17–1.22)
MONOCYTES NFR BLD AUTO: 8 % (ref 4–12)
NEUTROPHILS # BLD AUTO: 5.16 THOUSANDS/ΜL (ref 1.85–7.62)
NEUTS SEG NFR BLD AUTO: 62 % (ref 43–75)
NONHDLC SERPL-MCNC: 115 MG/DL
NRBC BLD AUTO-RTO: 0 /100 WBCS
PLATELET # BLD AUTO: 398 THOUSANDS/UL (ref 149–390)
PMV BLD AUTO: 11.3 FL (ref 8.9–12.7)
POTASSIUM SERPL-SCNC: 4.3 MMOL/L (ref 3.5–5.3)
PROT SERPL-MCNC: 8 G/DL (ref 6.4–8.4)
RBC # BLD AUTO: 4.41 MILLION/UL (ref 3.81–5.12)
SODIUM SERPL-SCNC: 142 MMOL/L (ref 135–147)
TIBC SERPL-MCNC: 856 UG/DL (ref 250–450)
TRIGL SERPL-MCNC: 134 MG/DL
TSH SERPL DL<=0.05 MIU/L-ACNC: 2.56 UIU/ML (ref 0.45–4.5)
UIBC SERPL-MCNC: 783 UG/DL (ref 155–355)
VIT B12 SERPL-MCNC: 1531 PG/ML (ref 180–914)
WBC # BLD AUTO: 8.23 THOUSAND/UL (ref 4.31–10.16)

## 2024-10-30 PROCEDURE — 84443 ASSAY THYROID STIM HORMONE: CPT

## 2024-10-30 PROCEDURE — 82746 ASSAY OF FOLIC ACID SERUM: CPT

## 2024-10-30 PROCEDURE — 83540 ASSAY OF IRON: CPT

## 2024-10-30 PROCEDURE — 36415 COLL VENOUS BLD VENIPUNCTURE: CPT

## 2024-10-30 PROCEDURE — 84207 ASSAY OF VITAMIN B-6: CPT

## 2024-10-30 PROCEDURE — 80061 LIPID PANEL: CPT

## 2024-10-30 PROCEDURE — 82043 UR ALBUMIN QUANTITATIVE: CPT

## 2024-10-30 PROCEDURE — 84630 ASSAY OF ZINC: CPT

## 2024-10-30 PROCEDURE — 82570 ASSAY OF URINE CREATININE: CPT

## 2024-10-30 PROCEDURE — 85025 COMPLETE CBC W/AUTO DIFF WBC: CPT

## 2024-10-30 PROCEDURE — 82306 VITAMIN D 25 HYDROXY: CPT

## 2024-10-30 PROCEDURE — 83036 HEMOGLOBIN GLYCOSYLATED A1C: CPT

## 2024-10-30 PROCEDURE — 82607 VITAMIN B-12: CPT

## 2024-10-30 PROCEDURE — 80053 COMPREHEN METABOLIC PANEL: CPT

## 2024-10-30 PROCEDURE — 82728 ASSAY OF FERRITIN: CPT

## 2024-10-30 PROCEDURE — 83550 IRON BINDING TEST: CPT

## 2024-11-02 LAB — ZINC SERPL-MCNC: 88 UG/DL (ref 44–115)

## 2024-11-03 LAB — VIT B6 SERPL-MCNC: 2 UG/L (ref 3.4–65.2)

## 2024-11-07 ENCOUNTER — OFFICE VISIT (OUTPATIENT)
Dept: FAMILY MEDICINE CLINIC | Facility: CLINIC | Age: 69
End: 2024-11-07
Payer: COMMERCIAL

## 2024-11-07 ENCOUNTER — APPOINTMENT (OUTPATIENT)
Dept: RADIOLOGY | Facility: CLINIC | Age: 69
End: 2024-11-07
Payer: COMMERCIAL

## 2024-11-07 VITALS
WEIGHT: 243.2 LBS | SYSTOLIC BLOOD PRESSURE: 120 MMHG | DIASTOLIC BLOOD PRESSURE: 80 MMHG | TEMPERATURE: 99 F | OXYGEN SATURATION: 96 % | HEIGHT: 66 IN | BODY MASS INDEX: 39.08 KG/M2 | HEART RATE: 80 BPM

## 2024-11-07 DIAGNOSIS — J22 LRTI (LOWER RESPIRATORY TRACT INFECTION): ICD-10-CM

## 2024-11-07 DIAGNOSIS — J22 LRTI (LOWER RESPIRATORY TRACT INFECTION): Primary | ICD-10-CM

## 2024-11-07 PROCEDURE — 71046 X-RAY EXAM CHEST 2 VIEWS: CPT

## 2024-11-07 PROCEDURE — 99213 OFFICE O/P EST LOW 20 MIN: CPT | Performed by: PHYSICIAN ASSISTANT

## 2024-11-07 RX ORDER — ALBUTEROL SULFATE 90 UG/1
2 INHALANT RESPIRATORY (INHALATION) EVERY 6 HOURS PRN
Qty: 6.7 G | Refills: 0 | Status: SHIPPED | OUTPATIENT
Start: 2024-11-07

## 2024-11-07 NOTE — PROGRESS NOTES
Ambulatory Visit  Name: Rosemarie Mace      : 1955      MRN: [id number]  Encounter Provider: Walker Hernandez PA-C  Encounter Date: 2024   Encounter department: [facility]    Assessment & Plan  LRTI (lower respiratory tract infection)  Covid/flu negative. Rhonchi/rales noted, begin augmentin, seek CXR. If suspicion for PNA is confirmed add azithromycin. Prn albuterol sent. VSS and pt in NAD. ER/return precautions  Orders:    XR chest pa and lateral; Future    amoxicillin-clavulanate (AUGMENTIN) 875-125 mg per tablet; Take 1 tablet by mouth every 12 (twelve) hours for 7 days    albuterol (Proventil HFA) 90 mcg/act inhaler; Inhale 2 puffs every 6 (six) hours as needed for wheezing         History of Present Illness     Pt presents with 5 days of severe cough, congestion, feels wheezing at night and fatigue. Exposed to two family members with PNA. No fevers. No SOB. VSS, pt in NAD       Review of Systems   Constitutional:  Positive for fatigue. Negative for chills and fever.   HENT:  Positive for congestion. Negative for ear pain, hearing loss, nosebleeds, postnasal drip, rhinorrhea, sinus pressure, sinus pain, sneezing and sore throat.    Eyes:  Negative for pain, discharge, itching and visual disturbance.   Respiratory:  Positive for cough. Negative for chest tightness, shortness of breath and wheezing.    Cardiovascular:  Negative for chest pain, palpitations and leg swelling.   Gastrointestinal:  Negative for abdominal pain, blood in stool, constipation, diarrhea, nausea and vomiting.   Genitourinary:  Negative for frequency and urgency.   Neurological:  Negative for dizziness, light-headedness and numbness.     Past Medical History:   Diagnosis Date    Bacterial vaginitis 10/08/2018    Complex atypical endometrial hyperplasia 2018    CPAP (continuous positive airway pressure) dependence     does not use machine    Diabetes (HCC)     DJD (degenerative joint disease)      Dyspareunia 06/16/2015    Gait abnormality     GERD (gastroesophageal reflux disease)     History of transfusion     Hyperlipidemia     Hypertension     Obesity (BMI 35.0-39.9 without comorbidity)     Sleep apnea     Uterine polyp 12/01/2017     Past Surgical History:   Procedure Laterality Date    BLADDER SUSPENSION      CHOLECYSTECTOMY      COLONOSCOPY      CYSTOSCOPY N/A 01/23/2018    Procedure: CYSTOSCOPY;  Surgeon: Kyle Lowery MD;  Location: [facility];  Service: Gynecology Oncology    EYE SURGERY      HYSTERECTOMY      JOINT REPLACEMENT Bilateral     FL HYSTEROSCOPY BX ENDOMETRIUM&/POLYPC W/WO D&C N/A 12/01/2017    Procedure: DILATATION AND CURETTAGE (D&C) WITH HYSTEROSCOPY; POLYPECTOMY;  Surgeon: Julio C Robin MD;  Location: [facility];  Service: Gynecology    FL LAPS TOTAL HYSTERECT 250 GM/< W/RMVL TUBE/OVARY N/A 01/23/2018    Procedure: ROBOTIC ASSISTED TOTAL LAPAROSCOPIC HYSTERECTOMY; BILATERAL SALPINGOOPHERECTOMY;  Surgeon: Kyle Lowery MD;  Location: [facility];  Service: Gynecology Oncology    REDUCTION MAMMAPLASTY Bilateral 18yrs ago    REPLACEMENT TOTAL KNEE BILATERAL      ROTATOR CUFF REPAIR Left     TUBAL LIGATION       Family History   Problem Relation Age of Onset    No Known Problems Mother     Emphysema Father     Bone cancer Sister     Lung cancer Sister     Brain cancer Sister     No Known Problems Sister     No Known Problems Sister     No Known Problems Sister     No Known Problems Daughter     Breast cancer Maternal Grandmother 80    No Known Problems Maternal Aunt     No Known Problems Maternal Aunt     No Known Problems Paternal Aunt     No Known Problems Paternal Aunt     No Known Problems Paternal Aunt     No Known Problems Paternal Aunt     Ovarian cancer Neg Hx     Colon cancer Neg Hx      Social History     Tobacco Use    Smoking status: Never     Passive exposure: Never    Smokeless tobacco: Never   Vaping Use    Vaping status: Never Used   Substance and Sexual  Activity    Alcohol use: No    Drug use: No    Sexual activity: Yes     Partners: Male     Comment: with      Current Outpatient Medications on File Prior to Visit   Medication Sig    Blood Glucose Monitoring Suppl (OneTouch Verio Reflect) w/Device KIT Check blood sugars once daily. Please substitute with appropriate alternative as covered by patient's insurance. Dx: E11.65    Cholecalciferol (Vitamin D3) 125 MCG (5000 UT) TABS Take 1 tablet (5,000 Units total) by mouth daily    ciclopirox (PENLAC) 8 % solution Apply as directed    cyclobenzaprine (FLEXERIL) 10 mg tablet Take 10 mg by mouth Three times daily as needed for muscle spasms    cycloSPORINE (RESTASIS) 0.05 % ophthalmic emulsion Administer 1 drop to both eyes 2 (two) times a day    diclofenac (VOLTAREN) 75 mg EC tablet Take 75 mg by mouth 4 (four) times a day    diltiazem (CARDIZEM CD) 180 mg 24 hr capsule Take 1 capsule (180 mg total) by mouth daily    estradiol (ESTRACE) 0.1 mg/g vaginal cream APPLY 2 TO 3 TIMES PER WEEK    fenofibrate (TRICOR) 145 mg tablet Take 1 tablet (145 mg total) by mouth daily    fluticasone (FLONASE) 50 mcg/act nasal spray 1 spray into each nostril daily    glucose blood (OneTouch Verio) test strip Check blood sugars once daily. Please substitute with appropriate alternative as covered by patient's insurance. Dx: E11.65    Lutein 20 MG CAPS Take 1 capsule (20 mg total) by mouth daily    meclizine (ANTIVERT) 25 mg tablet Take 1 tablet (25 mg total) by mouth 3 (three) times a day as needed for dizziness    naproxen (NAPROSYN) 500 mg tablet Take 1 tablet (500 mg total) by mouth 2 (two) times a day as needed for mild pain    omeprazole (PriLOSEC) 20 mg delayed release capsule Take 1 capsule (20 mg total) by mouth daily    OneTouch Delica Lancets 33G MISC Check blood sugars once daily. Please substitute with appropriate alternative as covered by patient's insurance. Dx: E11.65    Propylene Glycol 0.6 % SOLN Apply 1 drop to eye  "   semaglutide (Rybelsus) 3 MG tablet Take 1 tablet (3 mg total) by mouth daily before breakfast    semaglutide (Rybelsus) 7 MG tablet Take 1 tablet (7 mg total) by mouth daily before breakfast Start AFTER completing 3 mg course    valsartan (DIOVAN) 160 mg tablet Take 1 tablet (160 mg total) by mouth daily    vitamin B-12 (VITAMIN B-12) 1,000 mcg tablet TAKE 1 TABLET DAILY    zolpidem (AMBIEN) 10 mg tablet Take 1 tablet (10 mg total) by mouth daily at bedtime     Allergies   Allergen Reactions    Doxycycline Hives     Immunization History   Administered Date(s) Administered    COVID-19, unspecified 01/29/2021, 03/04/2021, 11/07/2021, 04/11/2022    INFLUENZA 10/02/2017, 10/04/2021, 09/26/2022, 08/16/2023    Pneumococcal Conjugate Vaccine 20-valent (Pcv20), Polysace 01/13/2023    Pneumococcal Polysaccharide PPV23 05/03/2016, 10/02/2017    Tdap 08/11/2022    Zoster Vaccine Recombinant 02/08/2022     Objective     /80   Pulse 80   Temp 99 °F (37.2 °C)   Ht 5' 6\" (1.676 m)   Wt 110 kg (243 lb 3.2 oz)   LMP  (LMP Unknown)   SpO2 96%   BMI 39.25 kg/m²     Physical Exam  Vitals and nursing note reviewed.   Constitutional:       General: She is not in acute distress.     Appearance: She is well-developed.   HENT:      Head: Normocephalic and atraumatic.      Right Ear: Tympanic membrane normal.      Left Ear: Tympanic membrane normal.      Nose: Nose normal.      Mouth/Throat:      Mouth: Mucous membranes are moist.      Pharynx: Oropharynx is clear.   Eyes:      Conjunctiva/sclera: Conjunctivae normal.   Cardiovascular:      Rate and Rhythm: Normal rate and regular rhythm.      Heart sounds: No murmur heard.  Pulmonary:      Effort: Pulmonary effort is normal. No respiratory distress.      Breath sounds: Rhonchi and rales present. No wheezing.   Musculoskeletal:         General: No swelling.      Cervical back: Neck supple.   Skin:     General: Skin is warm and dry.      Capillary Refill: Capillary refill " takes less than 2 seconds.   Neurological:      Mental Status: She is alert.   Psychiatric:         Mood and Affect: Mood normal.

## 2024-11-14 ENCOUNTER — RA CDI HCC (OUTPATIENT)
Dept: OTHER | Facility: HOSPITAL | Age: 69
End: 2024-11-14

## 2024-11-19 NOTE — ASSESSMENT & PLAN NOTE
A1c much improved and tolerating Rybelsus well -- will continue, refill sent.   Eye exam UTD -- will request records from [facility]   Lab Results   Component Value Date    HGBA1C 7.3 (H) 10/30/2024       Orders:    semaglutide (Rybelsus) 7 MG tablet; Take 1 tablet (7 mg total) by mouth daily before breakfast Start AFTER completing 3 mg course    Hemoglobin A1C; Future

## 2024-11-19 NOTE — ASSESSMENT & PLAN NOTE
Within goal in office, rare elevated home readings. Continue current regimen    Orders:    valsartan (DIOVAN) 160 mg tablet; Take 1 tablet (160 mg total) by mouth daily

## 2024-11-19 NOTE — ASSESSMENT & PLAN NOTE
Well controlled on tricor, continue   Lab Results   Component Value Date    HGBA1C 7.3 (H) 10/30/2024       Orders:    fenofibrate (TRICOR) 145 mg tablet; Take 1 tablet (145 mg total) by mouth daily

## 2024-11-19 NOTE — PROGRESS NOTES
Name: Rosemarie Mace      : 1955      MRN: [id number]  Encounter Provider: Lena De La Rosa DO  Encounter Date: 2024   Encounter department: [facility]  :  Assessment & Plan  Type 2 diabetes mellitus without complication, without long-term current use of insulin (HCC)  A1c much improved and tolerating Rybelsus well -- will continue, refill sent.   Eye exam UTD -- will request records from [facility]   Lab Results   Component Value Date    HGBA1C 7.3 (H) 10/30/2024       Orders:    semaglutide (Rybelsus) 7 MG tablet; Take 1 tablet (7 mg total) by mouth daily before breakfast Start AFTER completing 3 mg course    Hemoglobin A1C; Future    Hypertension associated with diabetes  (HCC)  Within goal in office, rare elevated home readings. Continue current regimen    Orders:    valsartan (DIOVAN) 160 mg tablet; Take 1 tablet (160 mg total) by mouth daily    Hyperlipidemia associated with type 2 diabetes mellitus  (HCC)  Well controlled on tricor, continue   Lab Results   Component Value Date    HGBA1C 7.3 (H) 10/30/2024       Orders:    fenofibrate (TRICOR) 145 mg tablet; Take 1 tablet (145 mg total) by mouth daily    SVT (supraventricular tachycardia) (HCC)  Asymptomatic, RRR on exam today        B12 deficiency  B12 above goal -- will decrease supplement to 500 mcg daily   Orders:    vitamin B-12 (VITAMIN B-12) 1,000 mcg tablet; Take 0.5 tablets (500 mcg total) by mouth daily    Burning mouth syndrome  Reviewed low folate and B6 -- pt is going to start OTC supplement. Will recheck levels in 3 months to monitor.   Orders:    gabapentin (NEURONTIN) 300 mg capsule; Take 1 capsule (300 mg total) by mouth daily at bedtime    Vitamin B12; Future    Vitamin B6; Future    Folate; Future    Primary insomnia  Symptoms sub-optimally managed with Ambien. Discussed trial of alternative including Trazodone. However, given we are starting Gabapentin, which can cause somnolence, will trial this  "first. Encouraged to trial for at least two weeks and send update via [employer]/or call if no improvement. If Gabapentin helps with burning mouth, but still struggling with sleep, can add on Trazodone. Reviewed possible ADRs including GI upset, dry mouth, \"hangover\" effect.   Orders:    gabapentin (NEURONTIN) 300 mg capsule; Take 1 capsule (300 mg total) by mouth daily at bedtime    Acute cough  Likely reactive airway in setting of recent respiratory infection. Discussed Medrol pack to calm inflammation/reactivity -- pt agreeable. Reviewed possible ADRs including palpitations, insomnia, increased appetite, mood fluctuations; not to combine with Motrin/Aleve/etc, Tylenol ok. Encourage supportive care with cough medicine. To call if symptoms persist/worsen.   Orders:    methylPREDNISolone 4 MG tablet therapy pack; Use as directed on package    promethazine-dextromethorphan (PHENERGAN-DM) 6.25-15 mg/5 mL oral syrup; Take 5 mL by mouth 4 (four) times a day as needed for cough    Thrombocytosis  Intermittent -- will update level with next labs   Orders:    CBC and differential; Future    Low ferritin  Borderline, update labs prior to next visit as above   Orders:    CBC and differential; Future    Iron Panel (Includes Ferritin, Iron Sat%, Iron, and TIBC); Future    Atrophic vaginitis  Encouraged to continue regular use of vaginal estrogen. Offered to check UA in office -- pt defers.   Orders:    estradiol (ESTRACE) 0.1 mg/g vaginal cream; Insert 0.5 g into the vagina 2 (two) times a week APPLY 2 TO 3 TIMES PER WEEK           History of Present Illness     HPI    Pt presents for chronic follow up, lab review     DM: A1c 7.3% (from 9.4) -- tolerating Rybelsus without issue; home sugars none; denies hypoglycemic symptoms   HTN: Home BPs \"on occasion\" sometimes \"a little high\"   HLD: Lipids: Total 147, LDL 88, HDL 32, ; LFTs WNL  SVT: ROS as below     Hb 12.3; Iron 73, Ferritin 24; Platelets 398  Cr 0.86/GFR 69 " "(improved)  B12 1531/Folate 5, D 62, B6 2 (low)   TSH WNL  Zinc WNL     Of note, pt was seen on 11/7 by Walker -- given Augmentin + Albuterol. CXR benign.   Still having cough, especially at night. Some intermittent shortness of breath/chest tightness.     Continues to have burning mouth symptoms.   Struggling with sleep -- falls asleep without issue, but wakes up in the middle of the night. Denies need to void, coughing/choking.   Having intermittent dysuria -- not using vaginal estrogen consistently.       Review of Systems   Constitutional:  Negative for diaphoresis.   HENT:          (+) burning mouth   Eyes:  Negative for visual disturbance.   Respiratory:  Positive for cough, chest tightness (sometimes) and shortness of breath (sometimes).    Cardiovascular:  Negative for chest pain, palpitations and leg swelling.   Gastrointestinal:  Negative for abdominal pain, blood in stool, constipation and diarrhea.   Endocrine: Negative for polyuria.   Genitourinary:  Positive for dysuria. Negative for hematuria and vaginal bleeding.   Neurological:  Positive for dizziness. Negative for tremors and headaches.   Psychiatric/Behavioral:  Positive for sleep disturbance (waking up in the middle of the night).      Medical History Reviewed by provider this encounter:     .     Objective   /70   Pulse 76   Temp 98.5 °F (36.9 °C)   Ht 5' 6\" (1.676 m)   Wt 111 kg (245 lb)   LMP  (LMP Unknown)   SpO2 96%   BMI 39.54 kg/m²      Physical Exam  Vitals and nursing note reviewed.   Constitutional:       General: She is not in acute distress.     Appearance: She is well-developed.   HENT:      Head: Normocephalic and atraumatic.      Right Ear: Tympanic membrane, ear canal and external ear normal.      Left Ear: Tympanic membrane, ear canal and external ear normal.      Nose: Rhinorrhea present.      Mouth/Throat:      Mouth: Mucous membranes are moist.      Pharynx: No oropharyngeal exudate or posterior oropharyngeal " erythema.   Eyes:      Conjunctiva/sclera: Conjunctivae normal.   Cardiovascular:      Rate and Rhythm: Normal rate and regular rhythm.   Pulmonary:      Effort: Pulmonary effort is normal. No respiratory distress.      Breath sounds: Normal breath sounds. No wheezing or rhonchi.   Abdominal:      General: Bowel sounds are normal. There is no distension.      Palpations: Abdomen is soft.      Tenderness: There is no abdominal tenderness.   Musculoskeletal:      Right lower leg: No edema.      Left lower leg: No edema.   Lymphadenopathy:      Cervical: No cervical adenopathy.   Skin:     General: Skin is warm and dry.   Neurological:      Mental Status: She is alert.      Comments: Grossly intact   Psychiatric:         Mood and Affect: Mood normal.

## 2024-11-20 ENCOUNTER — OFFICE VISIT (OUTPATIENT)
Dept: FAMILY MEDICINE CLINIC | Facility: CLINIC | Age: 69
End: 2024-11-20
Payer: COMMERCIAL

## 2024-11-20 VITALS
HEART RATE: 76 BPM | SYSTOLIC BLOOD PRESSURE: 136 MMHG | DIASTOLIC BLOOD PRESSURE: 70 MMHG | OXYGEN SATURATION: 96 % | WEIGHT: 245 LBS | TEMPERATURE: 98.5 F | HEIGHT: 66 IN | BODY MASS INDEX: 39.37 KG/M2

## 2024-11-20 DIAGNOSIS — R05.1 ACUTE COUGH: ICD-10-CM

## 2024-11-20 DIAGNOSIS — E11.59 HYPERTENSION ASSOCIATED WITH DIABETES  (HCC): ICD-10-CM

## 2024-11-20 DIAGNOSIS — E11.9 TYPE 2 DIABETES MELLITUS WITHOUT COMPLICATION, WITHOUT LONG-TERM CURRENT USE OF INSULIN (HCC): Primary | ICD-10-CM

## 2024-11-20 DIAGNOSIS — D75.839 THROMBOCYTOSIS: ICD-10-CM

## 2024-11-20 DIAGNOSIS — E53.8 B12 DEFICIENCY: ICD-10-CM

## 2024-11-20 DIAGNOSIS — I47.10 SVT (SUPRAVENTRICULAR TACHYCARDIA) (HCC): ICD-10-CM

## 2024-11-20 DIAGNOSIS — E78.5 HYPERLIPIDEMIA ASSOCIATED WITH TYPE 2 DIABETES MELLITUS  (HCC): ICD-10-CM

## 2024-11-20 DIAGNOSIS — R79.0 LOW FERRITIN: ICD-10-CM

## 2024-11-20 DIAGNOSIS — F51.01 PRIMARY INSOMNIA: ICD-10-CM

## 2024-11-20 DIAGNOSIS — I15.2 HYPERTENSION ASSOCIATED WITH DIABETES  (HCC): ICD-10-CM

## 2024-11-20 DIAGNOSIS — N95.2 ATROPHIC VAGINITIS: ICD-10-CM

## 2024-11-20 DIAGNOSIS — E11.69 HYPERLIPIDEMIA ASSOCIATED WITH TYPE 2 DIABETES MELLITUS  (HCC): ICD-10-CM

## 2024-11-20 DIAGNOSIS — K14.6 BURNING MOUTH SYNDROME: ICD-10-CM

## 2024-11-20 PROCEDURE — 99214 OFFICE O/P EST MOD 30 MIN: CPT | Performed by: FAMILY MEDICINE

## 2024-11-20 RX ORDER — FENOFIBRATE 145 MG/1
145 TABLET, COATED ORAL DAILY
Qty: 90 TABLET | Refills: 1 | Status: SHIPPED | OUTPATIENT
Start: 2024-11-20

## 2024-11-20 RX ORDER — METHYLPREDNISOLONE 4 MG/1
TABLET ORAL
Qty: 21 EACH | Refills: 0 | Status: SHIPPED | OUTPATIENT
Start: 2024-11-20

## 2024-11-20 RX ORDER — GABAPENTIN 300 MG/1
300 CAPSULE ORAL
Qty: 90 CAPSULE | Refills: 1 | Status: SHIPPED | OUTPATIENT
Start: 2024-11-20 | End: 2024-11-20 | Stop reason: SDUPTHER

## 2024-11-20 RX ORDER — DEXTROMETHORPHAN HYDROBROMIDE AND PROMETHAZINE HYDROCHLORIDE 15; 6.25 MG/5ML; MG/5ML
5 SYRUP ORAL 4 TIMES DAILY PRN
Qty: 118 ML | Refills: 0 | Status: SHIPPED | OUTPATIENT
Start: 2024-11-20

## 2024-11-20 RX ORDER — ESTRADIOL 0.1 MG/G
0.5 CREAM VAGINAL 2 TIMES WEEKLY
Start: 2024-11-20

## 2024-11-20 RX ORDER — VALSARTAN 160 MG/1
160 TABLET ORAL DAILY
Qty: 90 TABLET | Refills: 1 | Status: SHIPPED | OUTPATIENT
Start: 2024-11-20

## 2024-11-20 RX ORDER — LANOLIN ALCOHOL/MO/W.PET/CERES
500 CREAM (GRAM) TOPICAL DAILY
Status: SHIPPED
Start: 2024-11-20

## 2024-11-20 RX ORDER — GABAPENTIN 300 MG/1
300 CAPSULE ORAL
Qty: 90 CAPSULE | Refills: 1 | Status: SHIPPED | OUTPATIENT
Start: 2024-11-20

## 2024-11-20 NOTE — ASSESSMENT & PLAN NOTE
Encouraged to continue regular use of vaginal estrogen. Offered to check UA in office -- pt defers.   Orders:    estradiol (ESTRACE) 0.1 mg/g vaginal cream; Insert 0.5 g into the vagina 2 (two) times a week APPLY 2 TO 3 TIMES PER WEEK

## 2024-11-20 NOTE — ASSESSMENT & PLAN NOTE
B12 above goal -- will decrease supplement to 500 mcg daily   Orders:    vitamin B-12 (VITAMIN B-12) 1,000 mcg tablet; Take 0.5 tablets (500 mcg total) by mouth daily

## 2024-11-20 NOTE — PATIENT INSTRUCTIONS
Supplements:   B6   Folic Acid   Omega 3     Break B12 supplement in half -- only take 500 mcg daily

## 2024-11-20 NOTE — ASSESSMENT & PLAN NOTE
"Symptoms sub-optimally managed with Ambien. Discussed trial of alternative including Trazodone. However, given we are starting Gabapentin, which can cause somnolence, will trial this first. Encouraged to trial for at least two weeks and send update via [email or web address]/or call if no improvement. If Gabapentin helps with burning mouth, but still struggling with sleep, can add on Trazodone. Reviewed possible ADRs including GI upset, dry mouth, \"hangover\" effect.   Orders:    gabapentin (NEURONTIN) 300 mg capsule; Take 1 capsule (300 mg total) by mouth daily at bedtime    "

## 2025-01-02 DIAGNOSIS — K21.9 GASTROESOPHAGEAL REFLUX DISEASE, UNSPECIFIED WHETHER ESOPHAGITIS PRESENT: ICD-10-CM

## 2025-01-13 DIAGNOSIS — R42 DIZZINESS: ICD-10-CM

## 2025-01-13 DIAGNOSIS — Q15.9 EYE ABNORMALITY: ICD-10-CM

## 2025-01-14 RX ORDER — SPIRONOLACTONE 25 MG
1 TABLET ORAL DAILY
Qty: 90 CAPSULE | Refills: 0 | Status: SHIPPED | OUTPATIENT
Start: 2025-01-14

## 2025-01-14 RX ORDER — MECLIZINE HYDROCHLORIDE 25 MG/1
25 TABLET ORAL 3 TIMES DAILY PRN
Qty: 180 TABLET | Refills: 0 | Status: SHIPPED | OUTPATIENT
Start: 2025-01-14

## 2025-01-24 DIAGNOSIS — F51.01 PRIMARY INSOMNIA: Primary | ICD-10-CM

## 2025-01-24 RX ORDER — TRAZODONE HYDROCHLORIDE 50 MG/1
50 TABLET, FILM COATED ORAL
Qty: 30 TABLET | Refills: 1 | Status: SHIPPED | OUTPATIENT
Start: 2025-01-24

## 2025-02-10 DIAGNOSIS — F51.01 PRIMARY INSOMNIA: ICD-10-CM

## 2025-02-10 DIAGNOSIS — Q15.9 EYE ABNORMALITY: ICD-10-CM

## 2025-02-10 DIAGNOSIS — M79.671 RIGHT FOOT PAIN: ICD-10-CM

## 2025-02-10 DIAGNOSIS — K14.6 BURNING MOUTH SYNDROME: ICD-10-CM

## 2025-02-10 DIAGNOSIS — R42 DIZZINESS: ICD-10-CM

## 2025-02-11 RX ORDER — TRAZODONE HYDROCHLORIDE 50 MG/1
50 TABLET, FILM COATED ORAL
Qty: 30 TABLET | Refills: 0 | Status: SHIPPED | OUTPATIENT
Start: 2025-02-11 | End: 2025-02-20 | Stop reason: SDUPTHER

## 2025-02-11 RX ORDER — SPIRONOLACTONE 25 MG
1 TABLET ORAL DAILY
Qty: 90 CAPSULE | Refills: 0 | Status: SHIPPED | OUTPATIENT
Start: 2025-02-11

## 2025-02-11 RX ORDER — MECLIZINE HYDROCHLORIDE 25 MG/1
25 TABLET ORAL 3 TIMES DAILY PRN
Qty: 180 TABLET | Refills: 0 | Status: SHIPPED | OUTPATIENT
Start: 2025-02-11

## 2025-02-11 RX ORDER — NAPROXEN 500 MG/1
500 TABLET ORAL 2 TIMES DAILY PRN
Qty: 180 TABLET | Refills: 0 | Status: SHIPPED | OUTPATIENT
Start: 2025-02-11

## 2025-02-11 RX ORDER — GABAPENTIN 300 MG/1
300 CAPSULE ORAL
Qty: 90 CAPSULE | Refills: 0 | Status: SHIPPED | OUTPATIENT
Start: 2025-02-11

## 2025-02-15 ENCOUNTER — APPOINTMENT (OUTPATIENT)
Dept: LAB | Facility: CLINIC | Age: 70
End: 2025-02-15
Payer: COMMERCIAL

## 2025-02-15 DIAGNOSIS — K14.6 BURNING MOUTH SYNDROME: ICD-10-CM

## 2025-02-15 DIAGNOSIS — R42 DIZZINESS: ICD-10-CM

## 2025-02-15 DIAGNOSIS — R79.0 LOW FERRITIN: ICD-10-CM

## 2025-02-15 DIAGNOSIS — E11.9 TYPE 2 DIABETES MELLITUS WITHOUT COMPLICATION, WITHOUT LONG-TERM CURRENT USE OF INSULIN (HCC): ICD-10-CM

## 2025-02-15 DIAGNOSIS — D75.839 THROMBOCYTOSIS: ICD-10-CM

## 2025-02-15 LAB
BASOPHILS # BLD AUTO: 0.11 THOUSANDS/ΜL (ref 0–0.1)
BASOPHILS NFR BLD AUTO: 1 % (ref 0–1)
EOSINOPHIL # BLD AUTO: 0.35 THOUSAND/ΜL (ref 0–0.61)
EOSINOPHIL NFR BLD AUTO: 4 % (ref 0–6)
ERYTHROCYTE [DISTWIDTH] IN BLOOD BY AUTOMATED COUNT: 13.7 % (ref 11.6–15.1)
EST. AVERAGE GLUCOSE BLD GHB EST-MCNC: 171 MG/DL
FERRITIN SERPL-MCNC: 11 NG/ML (ref 11–307)
FOLATE SERPL-MCNC: 6.1 NG/ML
HBA1C MFR BLD: 7.6 %
HCT VFR BLD AUTO: 38.8 % (ref 34.8–46.1)
HGB BLD-MCNC: 11.6 G/DL (ref 11.5–15.4)
IMM GRANULOCYTES # BLD AUTO: 0.03 THOUSAND/UL (ref 0–0.2)
IMM GRANULOCYTES NFR BLD AUTO: 0 % (ref 0–2)
IRON SATN MFR SERPL: 6 % (ref 15–50)
IRON SERPL-MCNC: 36 UG/DL (ref 50–212)
LYMPHOCYTES # BLD AUTO: 2.09 THOUSANDS/ΜL (ref 0.6–4.47)
LYMPHOCYTES NFR BLD AUTO: 26 % (ref 14–44)
MCH RBC QN AUTO: 26.9 PG (ref 26.8–34.3)
MCHC RBC AUTO-ENTMCNC: 29.9 G/DL (ref 31.4–37.4)
MCV RBC AUTO: 90 FL (ref 82–98)
MONOCYTES # BLD AUTO: 0.7 THOUSAND/ΜL (ref 0.17–1.22)
MONOCYTES NFR BLD AUTO: 9 % (ref 4–12)
NEUTROPHILS # BLD AUTO: 4.9 THOUSANDS/ΜL (ref 1.85–7.62)
NEUTS SEG NFR BLD AUTO: 60 % (ref 43–75)
NRBC BLD AUTO-RTO: 0 /100 WBCS
PLATELET # BLD AUTO: 393 THOUSANDS/UL (ref 149–390)
PMV BLD AUTO: 11.2 FL (ref 8.9–12.7)
RBC # BLD AUTO: 4.31 MILLION/UL (ref 3.81–5.12)
TIBC SERPL-MCNC: 611.8 UG/DL (ref 250–450)
TRANSFERRIN SERPL-MCNC: 437 MG/DL (ref 203–362)
UIBC SERPL-MCNC: 576 UG/DL (ref 155–355)
VIT B12 SERPL-MCNC: 996 PG/ML (ref 180–914)
WBC # BLD AUTO: 8.18 THOUSAND/UL (ref 4.31–10.16)

## 2025-02-15 PROCEDURE — 36415 COLL VENOUS BLD VENIPUNCTURE: CPT

## 2025-02-15 PROCEDURE — 83550 IRON BINDING TEST: CPT

## 2025-02-15 PROCEDURE — 84207 ASSAY OF VITAMIN B-6: CPT

## 2025-02-15 PROCEDURE — 82728 ASSAY OF FERRITIN: CPT

## 2025-02-15 PROCEDURE — 85025 COMPLETE CBC W/AUTO DIFF WBC: CPT

## 2025-02-15 PROCEDURE — 83540 ASSAY OF IRON: CPT

## 2025-02-15 PROCEDURE — 83036 HEMOGLOBIN GLYCOSYLATED A1C: CPT

## 2025-02-15 PROCEDURE — 82607 VITAMIN B-12: CPT

## 2025-02-15 PROCEDURE — 82746 ASSAY OF FOLIC ACID SERUM: CPT

## 2025-02-16 ENCOUNTER — RESULTS FOLLOW-UP (OUTPATIENT)
Dept: FAMILY MEDICINE CLINIC | Facility: CLINIC | Age: 70
End: 2025-02-16

## 2025-02-17 RX ORDER — MECLIZINE HYDROCHLORIDE 25 MG/1
25 TABLET ORAL 3 TIMES DAILY PRN
Qty: 180 TABLET | Refills: 0 | OUTPATIENT
Start: 2025-02-17

## 2025-02-19 ENCOUNTER — TELEPHONE (OUTPATIENT)
Age: 70
End: 2025-02-19

## 2025-02-19 NOTE — TELEPHONE ENCOUNTER
Patient called in she has noticed for about a week now. Her pointer and ring finger nails on her right hand have pus under the nailbed and are slightly swollen. She also stated the have a  burning feel, she mentioned the pus is starting to ooze out of the base of the nails.     She wanted to know if something could be prescribed or if she would need to be seen?     She would like a call back with what her provider recommends.       Please advise, thank you

## 2025-02-19 NOTE — TELEPHONE ENCOUNTER
"Pt called in to get an update on her appt and having her finger assessed/treated. Triage nurse informed her that the office made an appt for the pt. Pt states that the 2/26 is too far out and her finger nail will fall off by then\". Triage nurse attempted to make an earlier appt for pt but nothing was available for same/day. Triage nurse called clerical for further assistance and the office staff took the call from here.   "

## 2025-02-19 NOTE — PROGRESS NOTES
Name: Rosemarie Mace      : 1955      MRN: [id number]  Encounter Provider: Lena De La Rosa DO  Encounter Date: 2025   Encounter department: [facility]  :  Assessment & Plan  Type 2 diabetes mellitus without complication, without long-term current use of insulin (HCC)  A1c increased from last check -- will refer to DM Ed to review diet modifications. Pt is currently on Rybelsus, but it is now quite expensive. Given sample in office today, but will also look into whether she would qualify for the program through the . If not, will need to consider an alternative regimen.   Lab Results   Component Value Date    HGBA1C 7.6 (H) 02/15/2025     Orders:  •  [facility] Diabetic eye exam  •  Ambulatory referral to Diabetic Education - use to refer for diabetes group classes, individual diabetes education, medical nutrition therapy, device training; Future    Hypertension associated with diabetes  (HCC)  Above goal in office and at home since decreased Valsartan. BP was too low at 160 mg, but too high at 80 mg so will trial 120 mg and monitor.   Lab Results   Component Value Date    HGBA1C 7.6 (H) 02/15/2025     Orders:  •  valsartan (DIOVAN) 80 mg tablet; Take 1.5 tablets (120 mg total) by mouth daily    Hyperlipidemia associated with type 2 diabetes mellitus  (HCC)  Well controlled with statin -- continue   Lab Results   Component Value Date    HGBA1C 7.6 (H) 02/15/2025          SVT (supraventricular tachycardia) (HCC)  Pt denies palpitations with current regimen. Does have irregular rhythm on exam, though reasonable rate (was initially high on rooming vitals). Perhaps increased dizziness is related to this? F/u with Cardio as scheduled.        Paronychia of finger of right hand  Exam consistent with paronychia -- pt is at increased risk of complication from infection given co-morbidities as above. Start Keflex. Encouraged to refrain from picking at nails.   Orders:  •  cephalexin  "(KEFLEX) 500 mg capsule; Take 1 capsule (500 mg total) by mouth every 8 (eight) hours for 7 days    Primary insomnia  No improvement with Trazodone at 50 mg -- will increase to 100 mg.   Orders:  •  traZODone (DESYREL) 100 mg tablet; Take 1 tablet (100 mg total) by mouth daily at bedtime    Iron deficiency  Unclear etiology, as previous iron level in normal range and pt notes no bleeding. Will check FIT. Start iron supplement -- reviewed this can cause constipation, so encouraged good hydration/stool softener. Increased dizziness could be related to this?   Orders:  •  ferrous sulfate 324 (65 Fe) mg; Take 1 tablet (324 mg total) by mouth daily before breakfast  •  iFOBT/FIT; Future    Encounter for screening mammogram for breast cancer    Orders:  •  Mammo screening bilateral w 3d and cad; Future           History of Present Illness   HPI    DM: Home sugars none; drinking a great deal more water   HTN: Home BPs running high since decrease in Valsartan due to hypotension    HLD: On statin     B12 996 (from 1500); Folate 6.1 (improved)  Platelets 393/low MCHC, otherwise CBC benign; Iron 36, Ferritin 11   A1c 7.6% (from 7.3)     Has redness/drainage/discomfort on 2nd and 4th digits on L hand -- has been present for 5 days. Used peroxide one day. Does note that she picks at her dry skin/hangnails.     Review of Systems   Eyes:  Positive for visual disturbance ((+) blurred at night).   Respiratory:  Negative for cough and shortness of breath.    Cardiovascular:  Negative for chest pain, palpitations and leg swelling.   Gastrointestinal:  Negative for abdominal pain, blood in stool, constipation and diarrhea.   Endocrine: Negative for polyuria.   Genitourinary:  Negative for dysuria and hematuria.   Neurological:  Positive for dizziness. Negative for headaches.       Objective   /92   Pulse (!) 120   Temp (!) 96.7 °F (35.9 °C)   Ht 5' 6\" (1.676 m)   Wt 111 kg (245 lb 12.8 oz)   LMP  (LMP Unknown)   SpO2 98%   " BMI 39.67 kg/m²      Physical Exam  Vitals and nursing note reviewed.   Constitutional:       General: She is not in acute distress.     Appearance: She is well-developed.   HENT:      Head: Normocephalic and atraumatic.      Right Ear: Tympanic membrane, ear canal and external ear normal.      Left Ear: Tympanic membrane, ear canal and external ear normal.      Nose: Nose normal. No rhinorrhea.      Mouth/Throat:      Mouth: Mucous membranes are moist.      Pharynx: No oropharyngeal exudate or posterior oropharyngeal erythema.   Eyes:      Conjunctiva/sclera: Conjunctivae normal.   Cardiovascular:      Rate and Rhythm: Normal rate. Rhythm irregular.   Pulmonary:      Effort: Pulmonary effort is normal. No respiratory distress.      Breath sounds: Normal breath sounds.   Abdominal:      General: Bowel sounds are normal. There is no distension.      Palpations: Abdomen is soft.      Tenderness: There is no abdominal tenderness.   Lymphadenopathy:      Cervical: No cervical adenopathy.   Skin:     General: Skin is warm and dry.      Comments: Dry peeling skin at base of nailbed with some erythema at 2nd and 4th L fingers. 2nd nail is lifting up from bed, possible purulence beneath    Neurological:      Mental Status: She is alert.      Comments: Grossly intact   Psychiatric:         Mood and Affect: Mood normal.

## 2025-02-20 ENCOUNTER — RESULTS FOLLOW-UP (OUTPATIENT)
Dept: FAMILY MEDICINE CLINIC | Facility: CLINIC | Age: 70
End: 2025-02-20

## 2025-02-20 ENCOUNTER — OFFICE VISIT (OUTPATIENT)
Dept: FAMILY MEDICINE CLINIC | Facility: CLINIC | Age: 70
End: 2025-02-20
Payer: COMMERCIAL

## 2025-02-20 ENCOUNTER — TELEPHONE (OUTPATIENT)
Dept: FAMILY MEDICINE CLINIC | Facility: CLINIC | Age: 70
End: 2025-02-20

## 2025-02-20 VITALS
SYSTOLIC BLOOD PRESSURE: 154 MMHG | DIASTOLIC BLOOD PRESSURE: 92 MMHG | BODY MASS INDEX: 39.5 KG/M2 | HEART RATE: 120 BPM | WEIGHT: 245.8 LBS | OXYGEN SATURATION: 98 % | HEIGHT: 66 IN | TEMPERATURE: 96.7 F

## 2025-02-20 DIAGNOSIS — E11.59 HYPERTENSION ASSOCIATED WITH DIABETES  (HCC): ICD-10-CM

## 2025-02-20 DIAGNOSIS — I47.10 SVT (SUPRAVENTRICULAR TACHYCARDIA) (HCC): ICD-10-CM

## 2025-02-20 DIAGNOSIS — E61.1 IRON DEFICIENCY: ICD-10-CM

## 2025-02-20 DIAGNOSIS — F51.01 PRIMARY INSOMNIA: ICD-10-CM

## 2025-02-20 DIAGNOSIS — E11.9 TYPE 2 DIABETES MELLITUS WITHOUT COMPLICATION, WITHOUT LONG-TERM CURRENT USE OF INSULIN (HCC): Primary | ICD-10-CM

## 2025-02-20 DIAGNOSIS — I15.2 HYPERTENSION ASSOCIATED WITH DIABETES  (HCC): ICD-10-CM

## 2025-02-20 DIAGNOSIS — Z12.31 ENCOUNTER FOR SCREENING MAMMOGRAM FOR BREAST CANCER: ICD-10-CM

## 2025-02-20 DIAGNOSIS — E11.69 HYPERLIPIDEMIA ASSOCIATED WITH TYPE 2 DIABETES MELLITUS  (HCC): ICD-10-CM

## 2025-02-20 DIAGNOSIS — L03.011 PARONYCHIA OF FINGER OF RIGHT HAND: ICD-10-CM

## 2025-02-20 DIAGNOSIS — E78.5 HYPERLIPIDEMIA ASSOCIATED WITH TYPE 2 DIABETES MELLITUS  (HCC): ICD-10-CM

## 2025-02-20 LAB
LEFT EYE DIABETIC RETINOPATHY: NORMAL
LEFT EYE IMAGE QUALITY: NORMAL
LEFT EYE MACULAR EDEMA: NORMAL
LEFT EYE OTHER RETINOPATHY: NORMAL
RIGHT EYE DIABETIC RETINOPATHY: NORMAL
RIGHT EYE IMAGE QUALITY: NORMAL
RIGHT EYE MACULAR EDEMA: NORMAL
RIGHT EYE OTHER RETINOPATHY: NORMAL
SEVERITY (EYE EXAM): NORMAL
VIT B6 SERPL-MCNC: 5.3 UG/L (ref 3.4–65.2)

## 2025-02-20 PROCEDURE — 99214 OFFICE O/P EST MOD 30 MIN: CPT | Performed by: FAMILY MEDICINE

## 2025-02-20 RX ORDER — TRAZODONE HYDROCHLORIDE 100 MG/1
100 TABLET ORAL
Qty: 30 TABLET | Refills: 1 | Status: SHIPPED | OUTPATIENT
Start: 2025-02-20

## 2025-02-20 RX ORDER — CEPHALEXIN 500 MG/1
500 CAPSULE ORAL EVERY 8 HOURS SCHEDULED
Qty: 21 CAPSULE | Refills: 0 | Status: SHIPPED | OUTPATIENT
Start: 2025-02-20 | End: 2025-02-27

## 2025-02-20 RX ORDER — FERROUS SULFATE 324(65)MG
324 TABLET, DELAYED RELEASE (ENTERIC COATED) ORAL
Qty: 90 TABLET | Refills: 1 | Status: SHIPPED | OUTPATIENT
Start: 2025-02-20

## 2025-02-20 RX ORDER — VALSARTAN 80 MG/1
120 TABLET ORAL DAILY
Qty: 135 TABLET | Refills: 1 | Status: SHIPPED | OUTPATIENT
Start: 2025-02-20

## 2025-02-20 NOTE — ASSESSMENT & PLAN NOTE
Pt denies palpitations with current regimen. Does have irregular rhythm on exam, though reasonable rate (was initially high on rooming vitals). Perhaps increased dizziness is related to this? F/u with Cardio as scheduled.

## 2025-02-20 NOTE — TELEPHONE ENCOUNTER
Tova -- can we please see if Rosemarie would qualify for [facility] program. Was previously covered, now $300 in new year

## 2025-02-20 NOTE — ASSESSMENT & PLAN NOTE
A1c increased from last check -- will refer to DM Ed to review diet modifications. Pt is currently on Rybelsus, but it is now quite expensive. Given sample in office today, but will also look into whether she would qualify for the program through the . If not, will need to consider an alternative regimen.   Lab Results   Component Value Date    HGBA1C 7.6 (H) 02/15/2025     Orders:  •  [facility] Diabetic eye exam  •  Ambulatory referral to Diabetic Education - use to refer for diabetes group classes, individual diabetes education, medical nutrition therapy, device training; Future

## 2025-02-20 NOTE — ASSESSMENT & PLAN NOTE
No improvement with Trazodone at 50 mg -- will increase to 100 mg.   Orders:  •  traZODone (DESYREL) 100 mg tablet; Take 1 tablet (100 mg total) by mouth daily at bedtime

## 2025-02-20 NOTE — ASSESSMENT & PLAN NOTE
Well controlled with statin -- continue   Lab Results   Component Value Date    HGBA1C 7.6 (H) 02/15/2025

## 2025-02-20 NOTE — ASSESSMENT & PLAN NOTE
Above goal in office and at home since decreased Valsartan. BP was too low at 160 mg, but too high at 80 mg so will trial 120 mg and monitor.   Lab Results   Component Value Date    HGBA1C 7.6 (H) 02/15/2025     Orders:  •  valsartan (DIOVAN) 80 mg tablet; Take 1.5 tablets (120 mg total) by mouth daily

## 2025-02-23 DIAGNOSIS — I47.10 SVT (SUPRAVENTRICULAR TACHYCARDIA) (HCC): ICD-10-CM

## 2025-02-25 RX ORDER — DILTIAZEM HYDROCHLORIDE 180 MG/1
180 CAPSULE, COATED, EXTENDED RELEASE ORAL DAILY
Qty: 90 CAPSULE | Refills: 5 | Status: SHIPPED | OUTPATIENT
Start: 2025-02-25

## 2025-03-06 ENCOUNTER — APPOINTMENT (OUTPATIENT)
Dept: LAB | Facility: CLINIC | Age: 70
End: 2025-03-06

## 2025-03-06 DIAGNOSIS — E61.1 IRON DEFICIENCY: ICD-10-CM

## 2025-03-06 DIAGNOSIS — F51.01 PRIMARY INSOMNIA: ICD-10-CM

## 2025-03-06 RX ORDER — TRAZODONE HYDROCHLORIDE 100 MG/1
100 TABLET ORAL
Qty: 30 TABLET | Refills: 0 | OUTPATIENT
Start: 2025-03-06

## 2025-03-07 ENCOUNTER — TRANSCRIBE ORDERS (OUTPATIENT)
Dept: LAB | Facility: HOSPITAL | Age: 70
End: 2025-03-07

## 2025-03-07 DIAGNOSIS — E61.1 IRON DEFICIENCY: Primary | ICD-10-CM

## 2025-03-14 ENCOUNTER — TELEPHONE (OUTPATIENT)
Age: 70
End: 2025-03-14

## 2025-03-14 ENCOUNTER — APPOINTMENT (OUTPATIENT)
Dept: LAB | Facility: CLINIC | Age: 70
End: 2025-03-14
Payer: COMMERCIAL

## 2025-03-14 DIAGNOSIS — E61.1 IRON DEFICIENCY: ICD-10-CM

## 2025-03-14 LAB — HEMOCCULT STL QL IA: POSITIVE

## 2025-03-14 PROCEDURE — G0328 FECAL BLOOD SCRN IMMUNOASSAY: HCPCS

## 2025-03-14 NOTE — TELEPHONE ENCOUNTER
Pt called in stating she received a letter in the mail that she was denied the assistance for RYBELSUS.     Pt is inquiring to Dr. De La Rosa if there is an alternative to Rybelsus that she can take & is more affordable? Pt states she cannot afford to pay $300 for the medication.     Please advise & call pt back with update on her inquiry. Thank you!    Rosemarie: [phone]

## 2025-03-16 ENCOUNTER — RESULTS FOLLOW-UP (OUTPATIENT)
Dept: FAMILY MEDICINE CLINIC | Facility: CLINIC | Age: 70
End: 2025-03-16

## 2025-03-16 DIAGNOSIS — R19.5 POSITIVE FIT (FECAL IMMUNOCHEMICAL TEST): ICD-10-CM

## 2025-03-16 DIAGNOSIS — E61.1 IRON DEFICIENCY: Primary | ICD-10-CM

## 2025-03-17 DIAGNOSIS — E11.9 TYPE 2 DIABETES MELLITUS WITHOUT COMPLICATION, WITHOUT LONG-TERM CURRENT USE OF INSULIN (HCC): Primary | ICD-10-CM

## 2025-04-02 DIAGNOSIS — E53.8 B12 DEFICIENCY: ICD-10-CM

## 2025-04-03 DIAGNOSIS — F51.01 PRIMARY INSOMNIA: Primary | ICD-10-CM

## 2025-04-03 RX ORDER — LANOLIN ALCOHOL/MO/W.PET/CERES
1000 CREAM (GRAM) TOPICAL DAILY
Qty: 90 TABLET | Refills: 1 | Status: SHIPPED | OUTPATIENT
Start: 2025-04-03

## 2025-04-03 RX ORDER — ZOLPIDEM TARTRATE 10 MG/1
10 TABLET ORAL
Qty: 30 TABLET | Refills: 2 | Status: SHIPPED | OUTPATIENT
Start: 2025-04-03 | End: 2025-04-04 | Stop reason: SDUPTHER

## 2025-04-04 ENCOUNTER — TELEPHONE (OUTPATIENT)
Age: 70
End: 2025-04-04

## 2025-04-04 ENCOUNTER — APPOINTMENT (EMERGENCY)
Dept: CT IMAGING | Facility: HOSPITAL | Age: 70
End: 2025-04-04
Payer: COMMERCIAL

## 2025-04-04 ENCOUNTER — HOSPITAL ENCOUNTER (EMERGENCY)
Facility: HOSPITAL | Age: 70
Discharge: HOME | End: 2025-04-04
Attending: EMERGENCY MEDICINE
Payer: COMMERCIAL

## 2025-04-04 VITALS
SYSTOLIC BLOOD PRESSURE: 124 MMHG | RESPIRATION RATE: 18 BRPM | DIASTOLIC BLOOD PRESSURE: 71 MMHG | OXYGEN SATURATION: 94 % | HEART RATE: 98 BPM | TEMPERATURE: 98.4 F

## 2025-04-04 DIAGNOSIS — F51.01 PRIMARY INSOMNIA: ICD-10-CM

## 2025-04-04 DIAGNOSIS — J81.1 PULMONARY EDEMA: ICD-10-CM

## 2025-04-04 DIAGNOSIS — R10.30 LOWER ABDOMINAL PAIN: Primary | ICD-10-CM

## 2025-04-04 LAB
ALBUMIN SERPL BCG-MCNC: 4.1 G/DL (ref 3.5–5)
ALP SERPL-CCNC: 42 U/L (ref 34–104)
ALT SERPL W P-5'-P-CCNC: 8 U/L (ref 7–52)
ANION GAP SERPL CALCULATED.3IONS-SCNC: 10 MMOL/L (ref 4–13)
AST SERPL W P-5'-P-CCNC: 13 U/L (ref 13–39)
BASOPHILS # BLD AUTO: 0.09 THOUSANDS/ÂΜL (ref 0–0.1)
BASOPHILS NFR BLD AUTO: 1 % (ref 0–1)
BILIRUB SERPL-MCNC: 0.46 MG/DL (ref 0.2–1)
BILIRUB UR QL STRIP: ABNORMAL
BUN SERPL-MCNC: 13 MG/DL (ref 5–25)
CALCIUM SERPL-MCNC: 9.2 MG/DL (ref 8.4–10.2)
CHLORIDE SERPL-SCNC: 107 MMOL/L (ref 96–108)
CLARITY UR: CLEAR
CO2 SERPL-SCNC: 23 MMOL/L (ref 21–32)
COLOR UR: YELLOW
CREAT SERPL-MCNC: 0.89 MG/DL (ref 0.6–1.3)
EOSINOPHIL # BLD AUTO: 0.21 THOUSAND/ÂΜL (ref 0–0.61)
EOSINOPHIL NFR BLD AUTO: 3 % (ref 0–6)
ERYTHROCYTE [DISTWIDTH] IN BLOOD BY AUTOMATED COUNT: 13.8 % (ref 11.6–15.1)
GFR SERPL CREATININE-BSD FRML MDRD: 65 ML/MIN/1.73SQ M
GLUCOSE SERPL-MCNC: 136 MG/DL (ref 65–140)
GLUCOSE UR STRIP-MCNC: NEGATIVE MG/DL
HCT VFR BLD AUTO: 37 % (ref 34.8–46.1)
HGB BLD-MCNC: 10.9 G/DL (ref 11.5–15.4)
HGB UR QL STRIP.AUTO: NEGATIVE
IMM GRANULOCYTES # BLD AUTO: 0.02 THOUSAND/UL (ref 0–0.2)
IMM GRANULOCYTES NFR BLD AUTO: 0 % (ref 0–2)
KETONES UR STRIP-MCNC: NEGATIVE MG/DL
LEUKOCYTE ESTERASE UR QL STRIP: NEGATIVE
LYMPHOCYTES # BLD AUTO: 1.48 THOUSANDS/ÂΜL (ref 0.6–4.47)
LYMPHOCYTES NFR BLD AUTO: 19 % (ref 14–44)
MCH RBC QN AUTO: 25.9 PG (ref 26.8–34.3)
MCHC RBC AUTO-ENTMCNC: 29.5 G/DL (ref 31.4–37.4)
MCV RBC AUTO: 88 FL (ref 82–98)
MONOCYTES # BLD AUTO: 0.61 THOUSAND/ÂΜL (ref 0.17–1.22)
MONOCYTES NFR BLD AUTO: 8 % (ref 4–12)
NEUTROPHILS # BLD AUTO: 5.39 THOUSANDS/ÂΜL (ref 1.85–7.62)
NEUTS SEG NFR BLD AUTO: 69 % (ref 43–75)
NITRITE UR QL STRIP: NEGATIVE
NRBC BLD AUTO-RTO: 0 /100 WBCS
PH UR STRIP.AUTO: 6 [PH]
PLATELET # BLD AUTO: 358 THOUSANDS/UL (ref 149–390)
PMV BLD AUTO: 11.5 FL (ref 8.9–12.7)
POTASSIUM SERPL-SCNC: 3.7 MMOL/L (ref 3.5–5.3)
PROT SERPL-MCNC: 7.6 G/DL (ref 6.4–8.4)
PROT UR STRIP-MCNC: NEGATIVE MG/DL
RBC # BLD AUTO: 4.21 MILLION/UL (ref 3.81–5.12)
SODIUM SERPL-SCNC: 140 MMOL/L (ref 135–147)
SP GR UR STRIP.AUTO: >=1.03
UROBILINOGEN UR QL STRIP.AUTO: 0.2 E.U./DL
WBC # BLD AUTO: 7.8 THOUSAND/UL (ref 4.31–10.16)

## 2025-04-04 PROCEDURE — 36415 COLL VENOUS BLD VENIPUNCTURE: CPT | Performed by: EMERGENCY MEDICINE

## 2025-04-04 PROCEDURE — 74177 CT ABD & PELVIS W/CONTRAST: CPT

## 2025-04-04 PROCEDURE — 80053 COMPREHEN METABOLIC PANEL: CPT | Performed by: EMERGENCY MEDICINE

## 2025-04-04 PROCEDURE — 81003 URINALYSIS AUTO W/O SCOPE: CPT | Performed by: EMERGENCY MEDICINE

## 2025-04-04 PROCEDURE — 96374 THER/PROPH/DIAG INJ IV PUSH: CPT

## 2025-04-04 PROCEDURE — 96361 HYDRATE IV INFUSION ADD-ON: CPT

## 2025-04-04 PROCEDURE — 85025 COMPLETE CBC W/AUTO DIFF WBC: CPT | Performed by: EMERGENCY MEDICINE

## 2025-04-04 PROCEDURE — 99284 EMERGENCY DEPT VISIT MOD MDM: CPT

## 2025-04-04 PROCEDURE — 99285 EMERGENCY DEPT VISIT HI MDM: CPT | Performed by: EMERGENCY MEDICINE

## 2025-04-04 RX ORDER — ZOLPIDEM TARTRATE 10 MG/1
10 TABLET ORAL
Qty: 30 TABLET | Refills: 2 | Status: SHIPPED | OUTPATIENT
Start: 2025-04-04

## 2025-04-04 RX ORDER — FUROSEMIDE 10 MG/ML
20 INJECTION INTRAMUSCULAR; INTRAVENOUS ONCE
Status: COMPLETED | OUTPATIENT
Start: 2025-04-04 | End: 2025-04-04

## 2025-04-04 RX ORDER — FUROSEMIDE 20 MG/1
20 TABLET ORAL DAILY
Qty: 4 TABLET | Refills: 0 | Status: SHIPPED | OUTPATIENT
Start: 2025-04-04 | End: 2025-04-08

## 2025-04-04 RX ADMIN — FUROSEMIDE 20 MG: 10 INJECTION, SOLUTION INTRAMUSCULAR; INTRAVENOUS at 12:30

## 2025-04-04 RX ADMIN — IOHEXOL 100 ML: 350 INJECTION, SOLUTION INTRAVENOUS at 10:34

## 2025-04-04 RX ADMIN — SODIUM CHLORIDE 1000 ML: 0.9 INJECTION, SOLUTION INTRAVENOUS at 09:57

## 2025-04-04 NOTE — DISCHARGE INSTRUCTIONS
It is important that you follow-up with your family doctor and cardiology in regards to the following CT findings:    Mild pulmonary edema likely secondary to CHF.

## 2025-04-04 NOTE — ED PROVIDER NOTES
Time reflects when diagnosis was documented in both MDM as applicable and the Disposition within this note       Time User Action Codes Description Comment    4/4/2025 12:42 PM Dimitris Villa Add [R10.30] Lower abdominal pain     4/4/2025 12:42 PM Dimitris Villa Add [J81.1] Pulmonary edema           ED Disposition       ED Disposition   Discharge    Condition   Stable    Date/Time   Fri Apr 4, 2025 12:42 PM    Comment   Rosemarie Mace discharge to home/self care.                   Assessment & Plan       Medical Decision Making  70-year-old female presenting for lower abdominal pain.  Symptoms on the past week.  Started with low back pain today.  No urinary symptoms, no vaginal bleeding or discharge.  No loose stools.  Has diffuse lower abdominal tenderness on exam  Differential includes diverticulitis versus colitis versus UTI versus appendicitis.  Will obtain CBC, CMP.  Will obtain UA.  Will obtain CT abdomen pelvis.  Patient declined pain medicine at this time.  Labs within normal limits.  CT abdomen pelvis shows no acute abnormality, does show some mild pulmonary edema.  Patient had an echo done 1 year ago with normal EF.  Patient denies any leg swelling, weight gain or shortness of breath.  Will give 20 IV Lasix, will start on a short prescription for Lasix.  Told to follow-up with family doctor as well as cardiologist    Problems Addressed:  Lower abdominal pain: acute illness or injury  Pulmonary edema: acute illness or injury    Amount and/or Complexity of Data Reviewed  Labs: ordered.  Radiology: ordered.    Risk  Prescription drug management.        ED Course as of 04/04/25 1612   Fri Apr 04, 2025   1203 Discussed results with the patient.  She says she will get some shortness of breath going up stairs but denies any shortness of breath currently.  She is not hypoxic.  Denies weight gain or leg swelling.  Given CT findings of pulmonary edema will give a dose of IV Lasix.  Will start on p.o. Lasix.  Told to  follow-up with her cardiologist.       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    CPAP (continuous positive airway pressure) dependence     does not use machine    Diabetes (HCC)     DJD (degenerative joint disease)     Dyspareunia 06/16/2015    Gait abnormality     GERD (gastroesophageal reflux disease)     History of transfusion     Hyperlipidemia     Hypertension     Obesity (BMI 35.0-39.9 without comorbidity)     Sleep apnea     SVT (supraventricular tachycardia) ([facility])     Uterine polyp 12/01/2017      Past Surgical History:   Procedure Laterality Date    BLADDER SUSPENSION      CHOLECYSTECTOMY      COLONOSCOPY      CYSTOSCOPY N/A 01/23/2018    Procedure: CYSTOSCOPY;  Surgeon: Kyle Lowery MD;  Location: [facility];  Service: Gynecology Oncology    EYE SURGERY      HYSTERECTOMY      JOINT REPLACEMENT Bilateral     LA HYSTEROSCOPY BX ENDOMETRIUM&/POLYPC W/WO D&C N/A 12/01/2017    Procedure: DILATATION AND CURETTAGE (D&C) WITH HYSTEROSCOPY; POLYPECTOMY;  Surgeon: Julio C Robin MD;  Location: [facility];  Service: Gynecology    LA LAPS TOTAL HYSTERECT 250 GM/< W/RMVL TUBE/OVARY N/A 01/23/2018    Procedure: ROBOTIC ASSISTED TOTAL LAPAROSCOPIC HYSTERECTOMY; BILATERAL SALPINGOOPHERECTOMY;  Surgeon: Kyle Lowery MD;  Location: [facility];  Service: Gynecology Oncology    REDUCTION MAMMAPLASTY Bilateral 18yrs ago    REPLACEMENT TOTAL KNEE BILATERAL       "ROTATOR CUFF REPAIR Left     TUBAL LIGATION        Family History   Problem Relation Age of Onset    No Known Problems Mother     Emphysema Father     Bone cancer Sister     Lung cancer Sister     Brain cancer Sister     No Known Problems Sister     No Known Problems Sister     No Known Problems Sister     No Known Problems Daughter     Breast cancer Maternal Grandmother 80    No Known Problems Maternal Aunt     No Known Problems Maternal Aunt     No Known Problems Paternal Aunt     No Known Problems Paternal Aunt     No Known Problems Paternal Aunt     No Known Problems Paternal Aunt     Ovarian cancer Neg Hx     Colon cancer Neg Hx       Social History     Tobacco Use    Smoking status: Never     Passive exposure: Never    Smokeless tobacco: Never   Vaping Use    Vaping status: Never Used   Substance Use Topics    Alcohol use: No    Drug use: No      E-Cigarette/Vaping    E-Cigarette Use Never User       E-Cigarette/Vaping Substances    Nicotine No     THC No     CBD No     Flavoring No     Other No     Unknown No       I have reviewed and agree with the history as documented.     70-year-old female presenting for evaluation of lower abdominal pain and low back pain.  Patient says she has been having lower abdominal pain over the past week.  Describes it as crampy in nature.  Says she started with some lower back pain today.  She denies any dysuria, hematuria increased urinary frequency.  She denies any fevers, chills, vomiting.  She does admit to some \"dry heaves.\"  Denies any upper abdominal pain, chest pain or shortness of breath.  She denies any loose stools vaginal bleeding or discharge        Review of Systems   Constitutional:  Negative for fever and unexpected weight change.   HENT:  Negative for congestion, ear pain, sore throat and trouble swallowing.    Eyes:  Negative for pain and redness.   Respiratory:  Negative for cough, chest tightness and shortness of breath.    Cardiovascular:  Negative for " chest pain and leg swelling.   Gastrointestinal:  Positive for abdominal pain (lower). Negative for abdominal distention, diarrhea and vomiting.   Endocrine: Negative for polyuria.   Genitourinary:  Negative for dysuria, hematuria, pelvic pain and vaginal bleeding.   Musculoskeletal:  Positive for back pain (low back). Negative for myalgias.   Skin:  Negative for rash.   Neurological:  Negative for dizziness, syncope, weakness, light-headedness and headaches.           Objective       ED Triage Vitals   Temperature Pulse Blood Pressure Respirations SpO2 Patient Position - Orthostatic VS   04/04/25 0937 04/04/25 0945 04/04/25 0945 04/04/25 0945 04/04/25 0945 04/04/25 0945   98.1 °F (36.7 °C) (!) 114 139/68 18 94 % Lying      Temp Source Heart Rate Source BP Location FiO2 (%) Pain Score    04/04/25 0937 04/04/25 0945 04/04/25 0945 -- --    Temporal Monitor Right arm        Vitals      Date and Time Temp Pulse SpO2 Resp BP Pain Score FACES Pain Rating User   04/04/25 1308 98.4 °F (36.9 °C) 98 94 % 18 124/71 -- -- [facility] [place]   04/04/25 1130 98.4 °F (36.9 °C) 83 94 % 18 126/78 -- -- [facility] [place]   04/04/25 1015 -- 99 94 % 18 122/88 -- -- [facility]   04/04/25 0945 -- 114 94 % 18 139/68 -- -- [facility]   04/04/25 0937 98.1 °F (36.7 °C) -- -- -- -- -- -- NAD            Physical Exam  Vitals and nursing note reviewed.   Constitutional:       General: She is not in acute distress.     Appearance: She is well-developed.   HENT:      Head: Normocephalic and atraumatic.      Right Ear: External ear normal.      Left Ear: External ear normal.      Nose: Nose normal.      Mouth/Throat:      Mouth: Mucous membranes are moist.      Pharynx: No oropharyngeal exudate.   Eyes:      Conjunctiva/sclera: Conjunctivae normal.      Pupils: Pupils are equal, round, and reactive to light.   Cardiovascular:      Rate and Rhythm: Normal rate and regular rhythm.      Heart sounds: Normal heart sounds. No murmur heard.     No friction rub. No gallop.   Pulmonary:       Effort: Pulmonary effort is normal. No respiratory distress.      Breath sounds: Normal breath sounds. No wheezing or rales.   Abdominal:      General: There is no distension.      Palpations: Abdomen is soft.      Tenderness: There is abdominal tenderness in the right lower quadrant, suprapubic area and left lower quadrant. There is no guarding.   Musculoskeletal:         General: No swelling, tenderness or deformity. Normal range of motion.      Cervical back: Normal range of motion and neck supple.   Lymphadenopathy:      Cervical: No cervical adenopathy.   Skin:     General: Skin is warm and dry.   Neurological:      General: No focal deficit present.      Mental Status: She is alert and oriented to person, place, and time. Mental status is at baseline.      Cranial Nerves: No cranial nerve deficit.      Sensory: No sensory deficit.      Motor: No weakness or abnormal muscle tone.      Coordination: Coordination normal.         Results Reviewed       Procedure Component Value Units Date/Time    Comprehensive metabolic panel [[id number]] Collected: 04/04/25 0955    Lab Status: Final result Specimen: Blood from Arm, Right Updated: 04/04/25 1024     Sodium 140 mmol/L      Potassium 3.7 mmol/L      Chloride 107 mmol/L      CO2 23 mmol/L      ANION GAP 10 mmol/L      BUN 13 mg/dL      Creatinine 0.89 mg/dL      Glucose 136 mg/dL      Calcium 9.2 mg/dL      AST 13 U/L      ALT 8 U/L      Alkaline Phosphatase 42 U/L      Total Protein 7.6 g/dL      Albumin 4.1 g/dL      Total Bilirubin 0.46 mg/dL      eGFR 65 ml/min/1.73sq m     Narrative:      National Kid[employer] Foundation guidelines for Chronic Kidney Disease (CKD):     Stage 1 with normal or high GFR (GFR > 90 mL/min/1.73 square meters)    Stage 2 Mild CKD (GFR = 60-89 mL/min/1.73 square meters)    Stage 3A Moderate CKD (GFR = 45-59 mL/min/1.73 square meters)    Stage 3B Moderate CKD (GFR = 30-44 mL/min/1.73 square meters)    Stage 4 Severe CKD (GFR = 15-29  mL/min/1.73 square meters)    Stage 5 End Stage CKD (GFR <15 mL/min/1.73 square meters)  Note: GFR calculation is accurate only with a steady state creatinine    UA (URINE) with reflex to Scope [[id number]]  (Abnormal) Collected: 04/04/25 0955    Lab Status: Final result Specimen: Urine, Clean Catch Updated: 04/04/25 1017     Color, UA Yellow     Clarity, UA Clear     Specific Gravity, UA >=1.030     pH, UA 6.0     Leukocytes, UA Negative     Nitrite, UA Negative     Protein, UA Negative mg/dl      Glucose, UA Negative mg/dl      Ketones, UA Negative mg/dl      Urobilinogen, UA 0.2 E.U./dl      Bilirubin, UA 1+     Occult Blood, UA Negative    CBC and differential [[id number]]  (Abnormal) Collected: 04/04/25 0955    Lab Status: Final result Specimen: Blood from Arm, Right Updated: 04/04/25 1007     WBC 7.80 Thousand/uL      RBC 4.21 Million/uL      Hemoglobin 10.9 g/dL      Hematocrit 37.0 %      MCV 88 fL      MCH 25.9 pg      MCHC 29.5 g/dL      RDW 13.8 %      MPV 11.5 fL      Platelets 358 Thousands/uL      nRBC 0 /100 WBCs      Segmented % 69 %      Immature Grans % 0 %      Lymphocytes % 19 %      Monocytes % 8 %      Eosinophils Relative 3 %      Basophils Relative 1 %      Absolute Neutrophils 5.39 Thousands/µL      Absolute Immature Grans 0.02 Thousand/uL      Absolute Lymphocytes 1.48 Thousands/µL      Absolute Monocytes 0.61 Thousand/µL      Eosinophils Absolute 0.21 Thousand/µL      Basophils Absolute 0.09 Thousands/µL             CT abdomen pelvis with contrast   Final Interpretation by Star Jin MD (04/04 1045)      Mild pulmonary edema likely secondary to CHF.      No acute findings in the abdomen or pelvis.      The study was marked in EPIC for immediate notification.         Workstation performed: [id number]             Procedures    ED Medication and Procedure Management   Prior to Admission Medications   Prescriptions Last Dose Informant Patient Reported? Taking?   Blood Glucose  Monitoring Suppl (OneTouch Verio Reflect) w/Device KIT  Self No No   Sig: Check blood sugars once daily. Please substitute with appropriate alternative as covered by patient's insurance. Dx: E11.65   Cholecalciferol (Vitamin D3) 125 MCG (5000 UT) TABS  Self No No   Sig: Take 1 tablet (5,000 Units total) by mouth daily   Lutein 20 MG CAPS   No No   Sig: Take 1 capsule (20 mg total) by mouth daily   OneTouch Delica Lancets 33G MISC  Self No No   Sig: Check blood sugars once daily. Please substitute with appropriate alternative as covered by patient's insurance. Dx: E11.65   Propylene Glycol 0.6 % SOLN  Self Yes No   Sig: Apply 1 drop to eye   albuterol (Proventil HFA) 90 mcg/act inhaler   No No   Sig: Inhale 2 puffs every 6 (six) hours as needed for wheezing   ciclopirox (PENLAC) 8 % solution  Self Yes No   Sig: Apply as directed   cycloSPORINE (RESTASIS) 0.05 % ophthalmic emulsion  Self No No   Sig: Administer 1 drop to both eyes 2 (two) times a day   cyclobenzaprine (FLEXERIL) 10 mg tablet  Self Yes No   Sig: Take 10 mg by mouth Three times daily as needed for muscle spasms   diclofenac (VOLTAREN) 75 mg EC tablet  Self Yes No   Sig: Take 75 mg by mouth 4 (four) times a day   diltiazem (CARDIZEM CD) 180 mg 24 hr capsule   No No   Sig: take 1 capsule by mouth once daily   estradiol (ESTRACE) 0.1 mg/g vaginal cream   No No   Sig: Insert 0.5 g into the vagina 2 (two) times a week APPLY 2 TO 3 TIMES PER WEEK   fenofibrate (TRICOR) 145 mg tablet   No No   Sig: Take 1 tablet (145 mg total) by mouth daily   ferrous sulfate 324 (65 Fe) mg   No No   Sig: Take 1 tablet (324 mg total) by mouth daily before breakfast   fluticasone (FLONASE) 50 mcg/act nasal spray   No No   Si spray into each nostril daily   gabapentin (NEURONTIN) 300 mg capsule   No No   Sig: Take 1 capsule (300 mg total) by mouth daily at bedtime   glucose blood (OneTouch Verio) test strip  Self No No   Sig: Check blood sugars once daily. Please substitute  with appropriate alternative as covered by patient's insurance. Dx: E11.65   meclizine (ANTIVERT) 25 mg tablet   No No   Sig: Take 1 tablet (25 mg total) by mouth 3 (three) times a day as needed for dizziness   naproxen (NAPROSYN) 500 mg tablet   No No   Sig: Take 1 tablet (500 mg total) by mouth 2 (two) times a day as needed for mild pain   omeprazole (PriLOSEC) 20 mg delayed release capsule   No No   Sig: TAKE 1 CAPSULE DAILY   semaglutide (Rybelsus) 7 MG tablet   No No   Sig: Take 1 tablet (7 mg total) by mouth daily before breakfast   valsartan (DIOVAN) 80 mg tablet   No No   Sig: Take 1.5 tablets (120 mg total) by mouth daily   vitamin B-12 (VITAMIN B-12) 1,000 mcg tablet   No No   Sig: TAKE 1 TABLET DAILY      Facility-Administered Medications: None     Discharge Medication List as of 4/4/2025 12:54 PM        START taking these medications    Details   furosemide (LASIX) 20 mg tablet Take 1 tablet (20 mg total) by mouth daily for 4 days, Starting Fri 4/4/2025, Until Tue 4/8/2025, Normal           CONTINUE these medications which have NOT CHANGED    Details   albuterol (Proventil HFA) 90 mcg/act inhaler Inhale 2 puffs every 6 (six) hours as needed for wheezing, Starting Thu 11/7/2024, Normal      Blood Glucose Monitoring Suppl (OneTouch Verio Reflect) w/Device KIT Check blood sugars once daily. Please substitute with appropriate alternative as covered by patient's insurance. Dx: E11.65, Normal      Cholecalciferol (Vitamin D3) 125 MCG (5000 UT) TABS Take 1 tablet (5,000 Units total) by mouth daily, Starting Thu 5/9/2024, Normal      ciclopirox (PENLAC) 8 % solution Apply as directed, Starting Wed 8/16/2023, Historical Med      cyclobenzaprine (FLEXERIL) 10 mg tablet Take 10 mg by mouth Three times daily as needed for muscle spasms, Starting Tue 2/20/2024, Historical Med      cycloSPORINE (RESTASIS) 0.05 % ophthalmic emulsion Administer 1 drop to both eyes 2 (two) times a day, Starting Thu 12/7/2023, Normal       diclofenac (VOLTAREN) 75 mg EC tablet Take 75 mg by mouth 4 (four) times a day, Starting Tue 2/20/2024, Until Wed 2/19/2025, Historical Med      diltiazem (CARDIZEM CD) 180 mg 24 hr capsule take 1 capsule by mouth once daily, Starting Tue 2/25/2025, Normal      estradiol (ESTRACE) 0.1 mg/g vaginal cream Insert 0.5 g into the vagina 2 (two) times a week APPLY 2 TO 3 TIMES PER WEEK, Starting Wed 11/20/2024, No Print      fenofibrate (TRICOR) 145 mg tablet Take 1 tablet (145 mg total) by mouth daily, Starting Wed 11/20/2024, Normal      ferrous sulfate 324 (65 Fe) mg Take 1 tablet (324 mg total) by mouth daily before breakfast, Starting Thu 2/20/2025, Normal      fluticasone (FLONASE) 50 mcg/act nasal spray 1 spray into each nostril daily, Starting Sun 8/18/2024, Normal      gabapentin (NEURONTIN) 300 mg capsule Take 1 capsule (300 mg total) by mouth daily at bedtime, Starting Tue 2/11/2025, Normal      glucose blood (OneTouch Verio) test strip Check blood sugars once daily. Please substitute with appropriate alternative as covered by patient's insurance. Dx: E11.65, Normal      Lutein 20 MG CAPS Take 1 capsule (20 mg total) by mouth daily, Starting Tue 2/11/2025, Normal      meclizine (ANTIVERT) 25 mg tablet Take 1 tablet (25 mg total) by mouth 3 (three) times a day as needed for dizziness, Starting Tue 2/11/2025, Normal      naproxen (NAPROSYN) 500 mg tablet Take 1 tablet (500 mg total) by mouth 2 (two) times a day as needed for mild pain, Starting Tue 2/11/2025, Normal      omeprazole (PriLOSEC) 20 mg delayed release capsule TAKE 1 CAPSULE DAILY, Starting Thu 1/2/2025, Normal      OneTouch Delica Lancets 33G MISC Check blood sugars once daily. Please substitute with appropriate alternative as covered by patient's insurance. Dx: E11.65, Normal      Propylene Glycol 0.6 % SOLN Apply 1 drop to eye, Historical Med      semaglutide (Rybelsus) 7 MG tablet Take 1 tablet (7 mg total) by mouth daily before breakfast,  Starting Mon 3/17/2025, Normal      valsartan (DIOVAN) 80 mg tablet Take 1.5 tablets (120 mg total) by mouth daily, Starting Thu 2/20/2025, Normal      vitamin B-12 (VITAMIN B-12) 1,000 mcg tablet TAKE 1 TABLET DAILY, Starting Thu 4/3/2025, Normal      zolpidem (AMBIEN) 10 mg tablet Take 1 tablet (10 mg total) by mouth daily at bedtime as needed for sleep, Starting Thu 4/3/2025, Normal           No discharge procedures on file.  ED SEPSIS DOCUMENTATION   Time reflects when diagnosis was documented in both MDM as applicable and the Disposition within this note       Time User Action Codes Description Comment    4/4/2025 12:42 PM Dimitris Villa [R10.30] Lower abdominal pain     4/4/2025 12:42 PM Dimitris Villa [J81.1] Pulmonary edema                  Dimitris Villa DO  04/04/25 161

## 2025-04-04 NOTE — TELEPHONE ENCOUNTER
Pt called as she was disconnected from call.  Asking for Ambien to please be sent to local [facility] as she only has a few days left of Trazodone and Ambien was originally sent to [facility].  States [facility] takes 2 weeks to get to her.  Please give pt a call once issue amended.

## 2025-04-04 NOTE — TELEPHONE ENCOUNTER
Patient called because zolpidem (AMBIEN) 10 mg tablet was supposed to be sent [facility] on file not [facility], please let patient know when it is fixed, thank you.

## 2025-04-17 NOTE — PROGRESS NOTES
Name: Rosemarie Mace      : 1955      MRN: [id number]  Encounter Provider: Femi Benitez MD  Encounter Date: 2025   Encounter department: [facility] [place]  :  Assessment & Plan  Positive occult stool blood test         Left lower quadrant pain         Chronic left lower quadrant pain  Patient is a pleasant 70-year-old female well-known to the practice status post colonoscopy in 2023 for polyps returning with 2-month history of chronic recurrent left lower quadrant abdominal pain of uncertain etiology for which consultation with gastroenterology for additional diagnostic and therapeutic treatment recommendations is now indicated.             History of Present Illness   HPI  Rosemarie Mace is a 70 y.o. female who presents with LLQ abd aching pain and because she had a positive stool test for blood. Pt states she has been dealing with this pain for 2 months. The pain is constant but she doesn't treat her pain with any meds she just takes the pain. Pt notice the pain worsen after eating. Pt also reports constant diarrhea but no blood when wiping. Pt denies constipation after, or rectal pain. Pt denies any nausea, issues with urination or fevers/chills. Lars.[email or web address] VANN  History obtained from: patient    Review of Systems   Constitutional:  Negative for chills and fever.   HENT:  Negative for ear pain and sore throat.    Eyes:  Negative for pain and visual disturbance.   Respiratory:  Negative for cough and shortness of breath.    Cardiovascular:  Negative for chest pain and palpitations.   Gastrointestinal:  Negative for abdominal pain and vomiting.   Genitourinary:  Negative for dysuria and hematuria.   Musculoskeletal:  Negative for arthralgias and back pain.   Skin:  Negative for color change and rash.   Neurological:  Negative for seizures and syncope.   All other systems reviewed and are negative.    Pertinent Medical History          Medical History Reviewed by  provider this encounter:     .  Past Medical History   Past Medical History:   Diagnosis Date    Bacterial vaginitis 10/08/2018    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    CPAP (continuous positive airway pressure) dependence     does not use machine    Diabetes (HCC)     DJD (degenerative joint disease)     Dyspareunia 06/16/2015    Gait abnormality     GERD (gastroesophageal reflux disease)     History of transfusion     Hyperlipidemia     Hypertension     Obesity (BMI 35.0-39.9 without comorbidity)     Sleep apnea     SVT (supraventricular tachycardia) (HCC)     Uterine polyp 12/01/2017     Past Surgical History:   Procedure Laterality Date    BLADDER SUSPENSION      CHOLECYSTECTOMY      COLONOSCOPY      CYSTOSCOPY N/A 01/23/2018    Procedure: CYSTOSCOPY;  Surgeon: Kyle Lowery MD;  [facility];  Service: Gynecology Oncology    EYE SURGERY      HYSTERECTOMY      JOINT REPLACEMENT Bilateral     IL HYSTEROSCOPY BX ENDOMETRIUM&/POLYPC W/WO D&C N/A 12/01/2017    Procedure: DILATATION AND CURETTAGE (D&C) WITH HYSTEROSCOPY; POLYPECTOMY;  Surgeon: Julio C Roibn MD;  Location[facility];  Service: Gynecology    IL LAPS TOTAL HYSTERECT 250 GM/< W/RMVL TUBE/OVARY N/A 01/23/2018    Procedure: ROBOTIC ASSISTED TOTAL LAPAROSCOPIC HYSTERECTOMY; BILATERAL SALPINGOOPHERECTOMY;  Surgeon: Kyle Lowery MD;  Location: [facility];  Service: Gynecology Oncology    REDUCTION MAMMAPLASTY Bilateral 18yrs ago    REPLACEMENT TOTAL KNEE BILATERAL      ROTATOR CUFF REPAIR Left     TUBAL LIGATION       Family History   Problem Relation Age of Onset    No Known Problems Mother     Emphysema Father     Bone cancer Sister     Lung cancer Sister     Brain cancer Sister     No Known Problems Sister     No Known Problems Sister     No Known Problems Sister     No Known Problems Daughter     Breast cancer Maternal Grandmother 80    No Known Problems Maternal Aunt     No Known Problems Maternal Aunt     No Known  Problems Paternal Aunt     No Known Problems Paternal Aunt     No Known Problems Paternal Aunt     No Known Problems Paternal Aunt     Ovarian cancer Neg Hx     Colon cancer Neg Hx       reports that she has never smoked. She has never been exposed to tobacco smoke. She has never used smokeless tobacco. She reports that she does not drink alcohol and does not use drugs.  Current Outpatient Medications   Medication Instructions    albuterol (Proventil HFA) 90 mcg/act inhaler 2 puffs, Inhalation, Every 6 hours PRN    Blood Glucose Monitoring Suppl (OneTouch Verio Reflect) w/Device KIT Check blood sugars once daily. Please substitute with appropriate alternative as covered by patient's insurance. Dx: E11.65    ciclopirox (PENLAC) 8 % solution Apply as directed    cyclobenzaprine (FLEXERIL) 10 mg, 3 times daily PRN    cycloSPORINE (RESTASIS) 0.05 % ophthalmic emulsion 1 drop, Both Eyes, 2 times daily    diclofenac (VOLTAREN) 75 mg, 4 times daily    diltiazem (CARDIZEM CD) 180 mg, Oral, Daily    estradiol (ESTRACE) 0.5 g, Vaginal, 2 times weekly, APPLY 2 TO 3 TIMES PER WEEK    fenofibrate (TRICOR) 145 mg, Oral, Daily    ferrous sulfate 324 mg, Oral, Daily before breakfast    fluticasone (FLONASE) 50 mcg/act nasal spray 1 spray, Nasal, Daily    furosemide (LASIX) 20 mg, Oral, Daily    gabapentin (NEURONTIN) 300 mg, Oral, Daily at bedtime    glucose blood (OneTouch Verio) test strip Check blood sugars once daily. Please substitute with appropriate alternative as covered by patient's insurance. Dx: E11.65    Lutein 20 mg, Oral, Daily    meclizine (ANTIVERT) 25 mg, Oral, 3 times daily PRN    naproxen (NAPROSYN) 500 mg, Oral, 2 times daily PRN,       omeprazole (PRILOSEC) 20 mg, Oral, Daily    OneTouch Delica Lancets 33G MISC Check blood sugars once daily. Please substitute with appropriate alternative as covered by patient's insurance. Dx: E11.65    Propylene Glycol 0.6 % SOLN 1 drop    semaglutide (RYBELSUS) 7 mg, Oral,  Daily before breakfast    valsartan (DIOVAN) 120 mg, Oral, Daily    vitamin B-12 (VITAMIN B-12) 1,000 mcg, Oral, Daily    Vitamin D3 5,000 Units, Oral, Daily    zolpidem (AMBIEN) 10 mg, Oral, Daily at bedtime PRN     Allergies   Allergen Reactions    Doxycycline Hives      Current Outpatient Medications on File Prior to Visit   Medication Sig Dispense Refill    albuterol (Proventil HFA) 90 mcg/act inhaler Inhale 2 puffs every 6 (six) hours as needed for wheezing 6.7 g 0    Blood Glucose Monitoring Suppl (OneTouch Verio Reflect) w/Device KIT Check blood sugars once daily. Please substitute with appropriate alternative as covered by patient's insurance. Dx: E11.65 1 kit 0    Cholecalciferol (Vitamin D3) 125 MCG (5000 UT) TABS Take 1 tablet (5,000 Units total) by mouth daily 90 tablet 1    ciclopirox (PENLAC) 8 % solution Apply as directed      cyclobenzaprine (FLEXERIL) 10 mg tablet Take 10 mg by mouth Three times daily as needed for muscle spasms      cycloSPORINE (RESTASIS) 0.05 % ophthalmic emulsion Administer 1 drop to both eyes 2 (two) times a day 5.5 mL 1    diltiazem (CARDIZEM CD) 180 mg 24 hr capsule take 1 capsule by mouth once daily 90 capsule 5    estradiol (ESTRACE) 0.1 mg/g vaginal cream Insert 0.5 g into the vagina 2 (two) times a week APPLY 2 TO 3 TIMES PER WEEK      fenofibrate (TRICOR) 145 mg tablet Take 1 tablet (145 mg total) by mouth daily 90 tablet 1    ferrous sulfate 324 (65 Fe) mg Take 1 tablet (324 mg total) by mouth daily before breakfast 90 tablet 1    fluticasone (FLONASE) 50 mcg/act nasal spray 1 spray into each nostril daily 16 g 1    gabapentin (NEURONTIN) 300 mg capsule Take 1 capsule (300 mg total) by mouth daily at bedtime 90 capsule 0    glucose blood (OneTouch Verio) test strip Check blood sugars once daily. Please substitute with appropriate alternative as covered by patient's insurance. Dx: E11.65 100 each 3    Lutein 20 MG CAPS Take 1 capsule (20 mg total) by mouth daily 90  "capsule 0    meclizine (ANTIVERT) 25 mg tablet Take 1 tablet (25 mg total) by mouth 3 (three) times a day as needed for dizziness 180 tablet 0    naproxen (NAPROSYN) 500 mg tablet Take 1 tablet (500 mg total) by mouth 2 (two) times a day as needed for mild pain 180 tablet 0    omeprazole (PriLOSEC) 20 mg delayed release capsule TAKE 1 CAPSULE DAILY 90 capsule 3    OneTouch Delica Lancets 33G MISC Check blood sugars once daily. Please substitute with appropriate alternative as covered by patient's insurance. Dx: E11.65 100 each 3    Propylene Glycol 0.6 % SOLN Apply 1 drop to eye      semaglutide (Rybelsus) 7 MG tablet Take 1 tablet (7 mg total) by mouth daily before breakfast 90 tablet 1    valsartan (DIOVAN) 80 mg tablet Take 1.5 tablets (120 mg total) by mouth daily 135 tablet 1    vitamin B-12 (VITAMIN B-12) 1,000 mcg tablet TAKE 1 TABLET DAILY 90 tablet 1    zolpidem (AMBIEN) 10 mg tablet Take 1 tablet (10 mg total) by mouth daily at bedtime as needed for sleep 30 tablet 2    diclofenac (VOLTAREN) 75 mg EC tablet Take 75 mg by mouth 4 (four) times a day      furosemide (LASIX) 20 mg tablet Take 1 tablet (20 mg total) by mouth daily for 4 days 4 tablet 0     No current facility-administered medications on file prior to visit.      Social History     Tobacco Use    Smoking status: Never     Passive exposure: Never    Smokeless tobacco: Never   Vaping Use    Vaping status: Never Used   Substance and Sexual Activity    Alcohol use: No    Drug use: No    Sexual activity: Yes     Partners: Male     Comment: with         Objective   /64 (BP Location: Left arm, Patient Position: Sitting, Cuff Size: Large)   Pulse 105   Temp 97.5 °F (36.4 °C) (Temporal)   Ht 5' 6\" (1.676 m)   Wt 108 kg (239 lb)   LMP  (LMP Unknown)   SpO2 98%   BMI 38.58 kg/m²      Physical Exam  Vitals and nursing note reviewed.   Constitutional:       General: She is not in acute distress.     Appearance: She is well-developed. "   HENT:      Head: Normocephalic and atraumatic.   Eyes:      Conjunctiva/sclera: Conjunctivae normal.   Cardiovascular:      Rate and Rhythm: Normal rate and regular rhythm.      Heart sounds: No murmur heard.  Pulmonary:      Effort: Pulmonary effort is normal. No respiratory distress.      Breath sounds: Normal breath sounds.   Abdominal:      Palpations: Abdomen is soft.      Tenderness: There is abdominal tenderness.      Comments: Focal left lower quadrant tenderness to palpation.   Musculoskeletal:         General: No swelling.      Cervical back: Neck supple.   Skin:     General: Skin is warm and dry.      Capillary Refill: Capillary refill takes less than 2 seconds.   Neurological:      Mental Status: She is alert.   Psychiatric:         Mood and Affect: Mood normal.         Administrative Statements   I have spent a total time of 20 minutes in caring for this patient on the day of the visit/encounter including Instructions for management.

## 2025-04-21 ENCOUNTER — CONSULT (OUTPATIENT)
Dept: SURGERY | Facility: CLINIC | Age: 70
End: 2025-04-21
Payer: COMMERCIAL

## 2025-04-21 VITALS
WEIGHT: 239 LBS | SYSTOLIC BLOOD PRESSURE: 114 MMHG | DIASTOLIC BLOOD PRESSURE: 64 MMHG | BODY MASS INDEX: 38.41 KG/M2 | TEMPERATURE: 97.5 F | OXYGEN SATURATION: 98 % | HEIGHT: 66 IN | HEART RATE: 105 BPM

## 2025-04-21 DIAGNOSIS — R10.32 CHRONIC LEFT LOWER QUADRANT PAIN: ICD-10-CM

## 2025-04-21 DIAGNOSIS — G89.29 CHRONIC LEFT LOWER QUADRANT PAIN: ICD-10-CM

## 2025-04-21 DIAGNOSIS — R19.5 POSITIVE OCCULT STOOL BLOOD TEST: Primary | ICD-10-CM

## 2025-04-21 DIAGNOSIS — R10.32 LEFT LOWER QUADRANT PAIN: ICD-10-CM

## 2025-04-21 PROCEDURE — 99213 OFFICE O/P EST LOW 20 MIN: CPT | Performed by: SURGERY

## 2025-04-21 NOTE — ASSESSMENT & PLAN NOTE
Patient is a pleasant 70-year-old female well-known to the practice status post colonoscopy in November 2023 for polyps returning with 2-month history of chronic recurrent left lower quadrant abdominal pain of uncertain etiology for which consultation with gastroenterology for additional diagnostic and therapeutic treatment recommendations is now indicated.

## 2025-04-22 ENCOUNTER — TELEPHONE (OUTPATIENT)
Dept: FAMILY MEDICINE CLINIC | Facility: CLINIC | Age: 70
End: 2025-04-22

## 2025-04-22 NOTE — TELEPHONE ENCOUNTER
Patient called requesting refill for     zolpidem (AMBIEN) 10 mg tablet   . Patient made aware medication was refilled on 4/4/2025 for 30 with 2 refills to [facility] # [id number] pharmacy. Patient instructed to contact the pharmacy to obtain refills of medication. Patient verbalized understanding.      Patient calling [facility] to pull Rx from [facility].   She will call if she has any problems

## 2025-04-23 ENCOUNTER — OFFICE VISIT (OUTPATIENT)
Dept: CARDIOLOGY CLINIC | Facility: CLINIC | Age: 70
End: 2025-04-23
Payer: COMMERCIAL

## 2025-04-23 VITALS
HEART RATE: 138 BPM | BODY MASS INDEX: 37.93 KG/M2 | SYSTOLIC BLOOD PRESSURE: 100 MMHG | WEIGHT: 236 LBS | HEIGHT: 66 IN | DIASTOLIC BLOOD PRESSURE: 70 MMHG

## 2025-04-23 DIAGNOSIS — I48.0 PAROXYSMAL ATRIAL FIBRILLATION (HCC): ICD-10-CM

## 2025-04-23 DIAGNOSIS — I47.10 SVT (SUPRAVENTRICULAR TACHYCARDIA) (HCC): ICD-10-CM

## 2025-04-23 DIAGNOSIS — J81.0 ACUTE PULMONARY EDEMA (HCC): Primary | ICD-10-CM

## 2025-04-23 PROCEDURE — 99214 OFFICE O/P EST MOD 30 MIN: CPT | Performed by: NURSE PRACTITIONER

## 2025-04-23 PROCEDURE — 93000 ELECTROCARDIOGRAM COMPLETE: CPT | Performed by: NURSE PRACTITIONER

## 2025-04-23 RX ORDER — METOPROLOL TARTRATE 25 MG/1
25 TABLET, FILM COATED ORAL EVERY 12 HOURS SCHEDULED
Qty: 60 TABLET | Refills: 3 | Status: SHIPPED | OUTPATIENT
Start: 2025-04-23

## 2025-04-23 NOTE — PROGRESS NOTES
Patient ID: Rosemarie Mace is a 70 y.o. female.        Plan:      Assessment & Plan  SVT (supraventricular tachycardia) (HCC)  Brief episode in the past  Acute pulmonary edema (HCC)  Noted on imaging during recent ER visit  Repeat echocardiogram  Paroxysmal atrial fibrillation (HCC)  History of brief episode of SVT in the past  Noted to have A-fib with RVR on EKG today.  Asymptomatic.  Discontinue valsartan, start metoprolol 25 mg twice daily  Continue diltiazem 180 to 80 mg daily  See additional comments below regarding anticoagulation      Follow up Plan/Summary Comments:  Rosemarie was seen today for an abnormal finding on imaging during a recent ER visit.  An EKG was performed due to an irregular heart rhythm noted on exam.  This showed A-fib with RVR.  She is largely asymptomatic though I question whether the increased frequency of lightheadedness is related.    Blood pressure reading is soft.  Will discontinue valsartan.  Add metoprolol 25 mg twice daily for better rate control.  Will continue diltiazem 180 mg daily.  Plan to repeat EKG on Friday to assess for better rate control.    Anticoagulation generally recommended given [id number] score of 3-4.  However I note a recent encounter from ENT for epistaxis as well as a recent positive result for fecal occult blood.  She is scheduled to follow-up with GI next week.  She also would like input from her PCP regarding this prior to initiation of any anticoagulant.    Will arrange for repeat echocardiogram to assess cardiac structure and function.    Follow-up pending EKG results later this week.    HPI: Rosemarie seen in the office today for a hospital follow-up visit.    Nicole was seen in the emergency department at [facility] 4/4/2025 with a report of abdominal pain.  Imaging was suggestive of mild pulmonary edema.  She was asymptomatic and treated with 20 mg IV Lasix and told to follow-up with cardiology.    She took Lasix for 4 days after the hospital visit and noted  "significant urine output.  She has not had any edema, changes in breathing, orthopnea, or significant weight change since that time.    Cardiac history is significant for a brief spell of SVT for which she has maintained on diltiazem.    At baseline, Rosemarie is mildly dyspneic when climbing the steps but this is unchanged from prior.  She denies any chest discomfort.  She does frequently experience lightheadedness and dizziness which is not new, however reports this is occurring with increased frequency.      Review of Systems   10  point ROS  was otherwise non pertinent or negative except as per HPI or as below.   Gait: Normal    Most recent or relevant cardiac/vascular testing:      Results for orders placed or performed in visit on 04/23/25   POCT ECG    Impression    Afib with RVR     Echo 2/28/2024  EF 60% mild LAE  No valvular abnormalities      Objective:     /70   Pulse (!) 138   Ht 5' 6\" (1.676 m)   Wt 107 kg (236 lb)   LMP  (LMP Unknown)   BMI 38.09 kg/m²     HR repeat 110    PHYSICAL EXAM:    General:  Normal appearance, no acute distress  Eyes:  Anicteric.  Oral mucosa:  Moist.  Neck:  No JVD. Carotid upstrokes are brisk without bruits.  No masses.  Chest:  Clear to auscultation   Cardiac: Irregularly irregular, mildly tachycardic.  No palpable PMI.  Normal S1 and S2.  No murmur gallop or rub.  Abdomen:  Soft and nontender. No palpable organomegaly or aortic enlargement.  Extremities:  No peripheral edema.  Musculoskeletal:  Symmetric.   Vascular:  Pedal pulses are intact.  Neuro:  Grossly symmetric.  Psych:  Alert and oriented x3.    Allergies   Allergen Reactions    Doxycycline Hives       Current Outpatient Medications:     albuterol (Proventil HFA) 90 mcg/act inhaler, Inhale 2 puffs every 6 (six) hours as needed for wheezing, Disp: 6.7 g, Rfl: 0    Blood Glucose Monitoring Suppl (OneTouch Verio Reflect) w/Device KIT, Check blood sugars once daily. Please substitute with appropriate " alternative as covered by patient's insurance. Dx: E11.65, Disp: 1 kit, Rfl: 0    Cholecalciferol (Vitamin D3) 125 MCG (5000 UT) TABS, Take 1 tablet (5,000 Units total) by mouth daily, Disp: 90 tablet, Rfl: 1    ciclopirox (PENLAC) 8 % solution, Apply as directed, Disp: , Rfl:     cyclobenzaprine (FLEXERIL) 10 mg tablet, Take 10 mg by mouth Three times daily as needed for muscle spasms, Disp: , Rfl:     cycloSPORINE (RESTASIS) 0.05 % ophthalmic emulsion, Administer 1 drop to both eyes 2 (two) times a day, Disp: 5.5 mL, Rfl: 1    diltiazem (CARDIZEM CD) 180 mg 24 hr capsule, take 1 capsule by mouth once daily, Disp: 90 capsule, Rfl: 5    estradiol (ESTRACE) 0.1 mg/g vaginal cream, Insert 0.5 g into the vagina 2 (two) times a week APPLY 2 TO 3 TIMES PER WEEK, Disp: , Rfl:     fenofibrate (TRICOR) 145 mg tablet, Take 1 tablet (145 mg total) by mouth daily, Disp: 90 tablet, Rfl: 1    ferrous sulfate 324 (65 Fe) mg, Take 1 tablet (324 mg total) by mouth daily before breakfast, Disp: 90 tablet, Rfl: 1    fluticasone (FLONASE) 50 mcg/act nasal spray, 1 spray into each nostril daily, Disp: 16 g, Rfl: 1    gabapentin (NEURONTIN) 300 mg capsule, Take 1 capsule (300 mg total) by mouth daily at bedtime, Disp: 90 capsule, Rfl: 0    glucose blood (OneTouch Verio) test strip, Check blood sugars once daily. Please substitute with appropriate alternative as covered by patient's insurance. Dx: E11.65, Disp: 100 each, Rfl: 3    Lutein 20 MG CAPS, Take 1 capsule (20 mg total) by mouth daily, Disp: 90 capsule, Rfl: 0    meclizine (ANTIVERT) 25 mg tablet, Take 1 tablet (25 mg total) by mouth 3 (three) times a day as needed for dizziness, Disp: 180 tablet, Rfl: 0    metoprolol tartrate (LOPRESSOR) 25 mg tablet, Take 1 tablet (25 mg total) by mouth every 12 (twelve) hours, Disp: 60 tablet, Rfl: 3    naproxen (NAPROSYN) 500 mg tablet, Take 1 tablet (500 mg total) by mouth 2 (two) times a day as needed for mild pain, Disp: 180 tablet, Rfl:  0    omeprazole (PriLOSEC) 20 mg delayed release capsule, TAKE 1 CAPSULE DAILY, Disp: 90 capsule, Rfl: 3    OneTouch Delica Lancets 33G MISC, Check blood sugars once daily. Please substitute with appropriate alternative as covered by patient's insurance. Dx: E11.65, Disp: 100 each, Rfl: 3    Propylene Glycol 0.6 % SOLN, Apply 1 drop to eye, Disp: , Rfl:     semaglutide (Rybelsus) 7 MG tablet, Take 1 tablet (7 mg total) by mouth daily before breakfast, Disp: 90 tablet, Rfl: 1    vitamin B-12 (VITAMIN B-12) 1,000 mcg tablet, TAKE 1 TABLET DAILY, Disp: 90 tablet, Rfl: 1    zolpidem (AMBIEN) 10 mg tablet, Take 1 tablet (10 mg total) by mouth daily at bedtime as needed for sleep, Disp: 30 tablet, Rfl: 2    diclofenac (VOLTAREN) 75 mg EC tablet, Take 75 mg by mouth 4 (four) times a day, Disp: , Rfl:     furosemide (LASIX) 20 mg tablet, Take 1 tablet (20 mg total) by mouth daily for 4 days, Disp: 4 tablet, Rfl: 0  Past Medical History:   Diagnosis Date    Atrial fibrillation (HCC)     Bacterial vaginitis 10/08/2018    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    CPAP (continuous positive airway pressure) dependence     does not use machine    Diabetes (HCC)     DJD (degenerative joint disease)     Dyspareunia 06/16/2015    Gait abnormality     GERD (gastroesophageal reflux disease)     History of transfusion     Hyperlipidemia     Hypertension     Obesity (BMI 35.0-39.9 without comorbidity)     Sleep apnea     SVT (supraventricular tachycardia) (HCC)     Uterine polyp 12/01/2017     Past Surgical History:   Procedure Laterality Date    BLADDER SUSPENSION      CHOLECYSTECTOMY      COLONOSCOPY      CYSTOSCOPY N/A 01/23/2018    Procedure: CYSTOSCOPY;  Surgeon: Kyle Lowery MD;  [facility];  Service: Gynecology Oncology    EYE SURGERY      HYSTERECTOMY      JOINT REPLACEMENT Bilateral     [place] HYSTEROSCOPY BX ENDOMETRIUM&/POLYPC W/WO D&C N/A 12/01/2017    Procedure: DILATATION AND CURETTAGE (D&C) WITH  HYSTEROSCOPY; POLYPECTOMY;  Surgeon: Julio C Robin MD;  [facility];  Service: Gynecology    OR LAPS TOTAL HYSTERECT 250 GM/< W/RMVL TUBE/OVARY N/A 01/23/2018    Procedure: ROBOTIC ASSISTED TOTAL LAPAROSCOPIC HYSTERECTOMY; BILATERAL SALPINGOOPHERECTOMY;  Surgeon: Kyle Lowery MD;  [facility];  Service: Gynecology Oncology    REDUCTION MAMMAPLASTY Bilateral 18yrs ago    REPLACEMENT TOTAL KNEE BILATERAL      ROTATOR CUFF REPAIR Left     TUBAL LIGATION         CMP:   Lab Results   Component Value Date     03/21/2018    K 3.7 04/04/2025    K 3.8 07/31/2022     04/04/2025     07/31/2022    CO2 23 04/04/2025    CO2 28 07/31/2022    BUN 13 04/04/2025    BUN 29 (H) 07/31/2022    CREATININE 0.89 04/04/2025    CREATININE 300 11/28/2023    CREATININE 1.21 (H) 07/31/2022    EGFR 65 04/04/2025    EGFR GFR not calculated due to lack of steady state. 07/31/2022     Lipid Profile:    Lab Results   Component Value Date    TRIG 134 10/30/2024    HDL 32 (L) 10/30/2024         Social History     Tobacco Use   Smoking Status Never    Passive exposure: Never   Smokeless Tobacco Never

## 2025-04-25 ENCOUNTER — CLINICAL SUPPORT (OUTPATIENT)
Dept: CARDIOLOGY CLINIC | Facility: CLINIC | Age: 70
End: 2025-04-25
Payer: COMMERCIAL

## 2025-04-25 DIAGNOSIS — I48.0 PAROXYSMAL ATRIAL FIBRILLATION (HCC): Primary | ICD-10-CM

## 2025-04-25 DIAGNOSIS — R94.31 ABNORMAL EKG: ICD-10-CM

## 2025-04-25 DIAGNOSIS — I47.10 SVT (SUPRAVENTRICULAR TACHYCARDIA) (HCC): ICD-10-CM

## 2025-04-25 PROCEDURE — 93000 ELECTROCARDIOGRAM COMPLETE: CPT

## 2025-04-25 PROCEDURE — 99212 OFFICE O/P EST SF 10 MIN: CPT

## 2025-04-25 NOTE — PROGRESS NOTES
Presents today for EKG    Feeling well.  Still lightheaded but this has been a chronic issue.  No syncope.    Tolerating metoprolol 25 mg twice daily, remains off valsartan.    Heart rate now well-controlled on this regimen.  Will continue.  See prior office note regarding anticoagulation.  We discussed with patient today.  Will await GI input and she will follow-up with her PCP as she would like her opinion on anticoagulation prior to starting anything.

## 2025-04-28 ENCOUNTER — TELEPHONE (OUTPATIENT)
Dept: GASTROENTEROLOGY | Facility: CLINIC | Age: 70
End: 2025-04-28

## 2025-04-28 ENCOUNTER — CONSULT (OUTPATIENT)
Dept: GASTROENTEROLOGY | Facility: CLINIC | Age: 70
End: 2025-04-28
Payer: COMMERCIAL

## 2025-04-28 VITALS
OXYGEN SATURATION: 98 % | HEIGHT: 66 IN | HEART RATE: 59 BPM | RESPIRATION RATE: 16 BRPM | WEIGHT: 236 LBS | BODY MASS INDEX: 37.93 KG/M2 | SYSTOLIC BLOOD PRESSURE: 130 MMHG | DIASTOLIC BLOOD PRESSURE: 70 MMHG | TEMPERATURE: 97.4 F

## 2025-04-28 DIAGNOSIS — R10.32 LLQ PAIN: ICD-10-CM

## 2025-04-28 DIAGNOSIS — R19.5 POSITIVE FIT (FECAL IMMUNOCHEMICAL TEST): ICD-10-CM

## 2025-04-28 DIAGNOSIS — K52.9 CHRONIC DIARRHEA: ICD-10-CM

## 2025-04-28 DIAGNOSIS — R93.5 ABNORMAL CT OF THE ABDOMEN: ICD-10-CM

## 2025-04-28 DIAGNOSIS — D50.9 IRON DEFICIENCY ANEMIA, UNSPECIFIED IRON DEFICIENCY ANEMIA TYPE: Primary | ICD-10-CM

## 2025-04-28 PROCEDURE — 99204 OFFICE O/P NEW MOD 45 MIN: CPT | Performed by: PHYSICIAN ASSISTANT

## 2025-04-28 RX ORDER — SODIUM CHLORIDE, SODIUM LACTATE, POTASSIUM CHLORIDE, CALCIUM CHLORIDE 600; 310; 30; 20 MG/100ML; MG/100ML; MG/100ML; MG/100ML
125 INJECTION, SOLUTION INTRAVENOUS CONTINUOUS
OUTPATIENT
Start: 2025-04-28

## 2025-04-28 NOTE — PATIENT INSTRUCTIONS
Scheduled date of EGD/colonoscopy (as of today): 5/13/25  Physician performing EGD/colonoscopy: Dr. Clifton   Location of EGD/colonoscopy: [place]   Desired bowel prep reviewed with patient: Miralax/dulcolax  Instructions reviewed with patient by: Maria R BARRIOS  Clearances: Cardiac clearance     And because you have iron deficiency anemia and got that positive stool test, we are going to complete an upper endoscopy and a colonoscopy.  Because you have chronic diarrhea and abdominal pain, I will check some blood work and stool testing now.  Please start on Benefiber powder every single day.  This should help to bulk up your stool.    We are going to request cardiology clearance prior to your procedure.    Stop any NSAIDs.

## 2025-04-28 NOTE — TELEPHONE ENCOUNTER
Our mutual patient is scheduled for procedure: egd and colon    On: 5/13/25     With: Dr. Michael Clifton    Physician Approving clearance: satnam vuong        Our mutual patient is scheduled for a cs and egd. Beth Siddiqui is asking for a cardiac clearance on this patient.

## 2025-04-28 NOTE — PROGRESS NOTES
Name: Rosemarie Mace      : 1955      MRN: [id number]  Encounter Provider: Beth Siddiqui PA-C  Encounter Date: 2025   Encounter department: [facility] [place]  :  Assessment & Plan  Iron deficiency anemia, unspecified iron deficiency anemia type  Pt with LEE noted on blood work and positive FIT.   Reviewed this may indicate occult bleeding in GI tract.   Recommend bidirectional endoscopic evaluation now for additional investigation.  Continue on oral iron per primary team.   May need to consider IV iron in the future.  Continue PPI daily.     I discussed informed consent with the patient. The risks/benefits/alternatives of the procedure were discussed with the patient. Risks included, but not limited to, infection, bleeding, perforation, injury to organs in the abdomen, missed lesion and incomplete procedure were discussed. Patient was agreeable.    Miralax prep recommended for pt. She did not tolerate Golytely or Sutab in the past.     Orders:    Ambulatory Referral to Gastroenterology    Tissue Transglutaminase, IgA; Future    IgA; Future    Pancreatic elastase, fecal; Future    Ova and parasite examination; Future    Giardia antigen; Future    Calprotectin,Fecal; Future    Colonoscopy; Future    EGD; Future    Positive FIT (fecal immunochemical test)  Discussion above.   Orders:    Ambulatory Referral to Gastroenterology    Tissue Transglutaminase, IgA; Future    IgA; Future    Pancreatic elastase, fecal; Future    Ova and parasite examination; Future    Giardia antigen; Future    Calprotectin,Fecal; Future    Colonoscopy; Future    EGD; Future    LLQ pain  Pt notes post-prandial LLQ pain.   Etiol unclear, IBS, IBD, PUD, h pylori infection, vascular pathology, adhesive disease, non-GI pathology, etc.   CT from 2025 with stable findings of mild mesenteric panniculitis, dating back to 2019.   Check stool studies now.  Consider trial IBGard vs anti-spasmodic in  the future.     Check colonoscopy now.   High fiber diet, start benefiber daily to add bulk/form to her loose stool.   Check targeted stool studies to include fecal calprotectin, EPI testing, chronic infection.       Abnormal CT of the abdomen  Discussed above.   Findings dating back to 2018 on CT scan, overall stable.   This is more suggestive of a non-malignant, stable non-progressive process.   Continue to monitor at this time.        Chronic diarrhea  Discussion above.   Ddx includes IBS, IBD, bile salt diarrhea, SIBO, celiac disease, etc.   Stool studies, serologic testing.   Recommend random colon bx to evaluate for microscopic colitis in the setting of macroscopically normal appearing mucus.       We will follow up after bidirectional endoscopic evaluation is completed.   *I am recommending cardiac clearance given pt's recent findings of pulmonary edema and PAF not on anticoagulation.     History of Present Illness   Rosemarie Mace is a 70 y.o. female who presents for evaluation of positive FIT. Pmhx sig for HTN, DM2, HLD, GERD, VIVIANA, atrial fibrillation, chronic pain syndrome, vitamin D deficiency.  Hx of cholecystectomy.  HPI  History obtained from: patient    Patient is new to me.  She has been dealing with left lower quadrant pain for a couple of months at least.  No obvious precipitant to the onset of symptoms.  Can occur postprandially.  Sometimes can last all day.  Feels like an ache in the left lower quadrant that does not radiate.  Has not found anything that has helped to alleviate discomfort. Having loose stools liquid stool daily, anywhere from 4-5 times daily.  This has been her pattern for many years.  Since starting iron her stool color has been black.  No BRBPR. No nocturnal BM.    No heartburn, indigestion. Notes some nausea at time of LLQ pain. No emesis. No hematemesis. No dysphagia or odynophagia.      Pt lost 8 lbs over past few months. No fevers, chills, night sweats.   No family hx of  CRC.     NSAIDs: none  Etoh: no regular use   Tobacco: none     04/2025: Hb 10.9, MCV 80, platelets 358, BUN 13, creatinine 0.89, AST 13, ALT 8, ALP 42, albumin 4.1, T. bili 0.46, ferritin 11, TSAT 6, iron 36, TIBC 611.8  04/2025: CT A/P: Mild pulmonary edema likely secondary to CHF. No acute findings in the abdomen or pelvis.    Endoscopic history:   Colon: 11/2023: Subcentimeter polyp in the mid sigmoid colon; One polyp measuring 5-9 mm in the proximal transverse colon; Small, prolapsing hemorrhoids   A. Large Intestine, Sigmoid Colon:Hyperplastic polyps. No evidence of adenomatous change or malignancy.   B. Large Intestine, Transverse Colon, biopsy at 90cm: Colonic polyp with focal serrated features, suggestive of sessile serrated adenoma; No evidence of malignancy    Review of Systems A complete review of systems is negative other than that noted above in the HPI.    Past Medical History   Past Medical History:   Diagnosis Date    Atrial fibrillation (HCC)     Bacterial vaginitis 10/08/2018    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    CPAP (continuous positive airway pressure) dependence     does not use machine    Diabetes (HCC)     Dizziness 2022    DJD (degenerative joint disease)     Dyspareunia 06/16/2015    Gait abnormality     GERD (gastroesophageal reflux disease)     History of transfusion     Hyperlipidemia     Hypertension     Nasal congestion     Nasal obstruction     Nosebleed ??    Very very dry at home and makes my nosebleed and i take anything to get the dry stuff out causing more bleeding. Never endung cycle    Obesity (BMI 35.0-39.9 without comorbidity)     Sleep apnea     Stress fracture 11/11/2022    SVT (supraventricular tachycardia) (HCC)     Uterine polyp 12/01/2017     Past Surgical History:   Procedure Laterality Date    BLADDER SUSPENSION      CHOLECYSTECTOMY      COLONOSCOPY      CYSTOSCOPY N/A 01/23/2018    Procedure: CYSTOSCOPY;  Surgeon: Kyle Lwoery MD;  [facility]:  [facility];  Service: Gynecology Oncology    EYE SURGERY      HYSTERECTOMY      JOINT REPLACEMENT Bilateral     NECK SURGERY  2023    DC HYSTEROSCOPY BX ENDOMETRIUM&/POLYPC W/WO D&C N/A 12/01/2017    Procedure: DILATATION AND CURETTAGE (D&C) WITH HYSTEROSCOPY; POLYPECTOMY;  Surgeon: Julio C Robin MD;  Location: [facility];  Service: Gynecology    DC LAPS TOTAL HYSTERECT 250 GM/< W/RMVL TUBE/OVARY N/A 01/23/2018    Procedure: ROBOTIC ASSISTED TOTAL LAPAROSCOPIC HYSTERECTOMY; BILATERAL SALPINGOOPHERECTOMY;  Surgeon: Kyle Lowery MD;  Location: [facility];  Service: Gynecology Oncology    REDUCTION MAMMAPLASTY Bilateral 18yrs ago    REPLACEMENT TOTAL KNEE BILATERAL      ROTATOR CUFF REPAIR Left     TRIGGER POINT INJECTION  2007    TUBAL LIGATION       Family History   Problem Relation Age of Onset    No Known Problems Mother     Emphysema Father     Bone cancer Sister     Lung cancer Sister     Brain cancer Sister     No Known Problems Sister     No Known Problems Sister     No Known Problems Sister     Migraines Daughter     Breast cancer Maternal Grandmother 80    No Known Problems Maternal Aunt     No Known Problems Maternal Aunt     No Known Problems Paternal Aunt     No Known Problems Paternal Aunt     No Known Problems Paternal Aunt     No Known Problems Paternal Aunt     Ovarian cancer Neg Hx     Colon cancer Neg Hx       reports that she has never smoked. She has never been exposed to tobacco smoke. She has never used smokeless tobacco. She reports that she does not drink alcohol and does not use drugs.  Current Outpatient Medications   Medication Instructions    albuterol (Proventil HFA) 90 mcg/act inhaler 2 puffs, Inhalation, Every 6 hours PRN    Blood Glucose Monitoring Suppl (OneTouch Verio Reflect) w/Device KIT Check blood sugars once daily. Please substitute with appropriate alternative as covered by patient's insurance. Dx: E11.65    ciclopirox (PENLAC) 8 % solution Apply as directed     "cyclobenzaprine (FLEXERIL) 10 mg, 3 times daily PRN    cycloSPORINE (RESTASIS) 0.05 % ophthalmic emulsion 1 drop, Both Eyes, 2 times daily    diclofenac (VOLTAREN) 75 mg, 4 times daily    diltiazem (CARDIZEM CD) 180 mg, Oral, Daily    estradiol (ESTRACE) 0.5 g, Vaginal, 2 times weekly, APPLY 2 TO 3 TIMES PER WEEK    fenofibrate (TRICOR) 145 mg, Oral, Daily    ferrous sulfate 324 mg, Oral, Daily before breakfast    fluticasone (FLONASE) 50 mcg/act nasal spray 1 spray, Nasal, Daily    furosemide (LASIX) 20 mg, Oral, Daily    gabapentin (NEURONTIN) 300 mg, Oral, Daily at bedtime    glucose blood (OneTouch Verio) test strip Check blood sugars once daily. Please substitute with appropriate alternative as covered by patient's insurance. Dx: E11.65    Lutein 20 mg, Oral, Daily    meclizine (ANTIVERT) 25 mg, Oral, 3 times daily PRN    metoprolol tartrate (LOPRESSOR) 25 mg, Oral, Every 12 hours scheduled    naproxen (NAPROSYN) 500 mg, Oral, 2 times daily PRN,       omeprazole (PRILOSEC) 20 mg, Oral, Daily    OneTouch Delica Lancets 33G MISC Check blood sugars once daily. Please substitute with appropriate alternative as covered by patient's insurance. Dx: E11.65    Propylene Glycol 0.6 % SOLN 1 drop    semaglutide (RYBELSUS) 7 mg, Oral, Daily before breakfast    vitamin B-12 (VITAMIN B-12) 1,000 mcg, Oral, Daily    Vitamin D3 5,000 Units, Oral, Daily    zolpidem (AMBIEN) 10 mg, Oral, Daily at bedtime PRN     Allergies   Allergen Reactions    Doxycycline Hives         Objective   /70 (BP Location: Right arm, Patient Position: Sitting, Cuff Size: Large)   Pulse 59   Temp (!) 97.4 °F (36.3 °C) (Temporal)   Resp 16   Ht 5' 6\" (1.676 m)   Wt 107 kg (236 lb)   LMP  (LMP Unknown)   SpO2 98%   BMI 38.09 kg/m²     Physical Exam  Vitals and nursing note reviewed.   Constitutional:       General: She is not in acute distress.     Appearance: She is well-developed.   HENT:      Head: Normocephalic and atraumatic.   Eyes: "      General: No scleral icterus.     Conjunctiva/sclera: Conjunctivae normal.   Cardiovascular:      Rate and Rhythm: Normal rate.   Pulmonary:      Effort: Pulmonary effort is normal. No respiratory distress.   Abdominal:      General: Bowel sounds are normal. There is no distension.      Palpations: Abdomen is soft.      Tenderness: There is abdominal tenderness. There is no guarding or rebound.   Skin:     General: Skin is warm and dry.      Coloration: Skin is not jaundiced.   Neurological:      General: No focal deficit present.      Mental Status: She is alert.   Psychiatric:         Mood and Affect: Mood normal.         Behavior: Behavior normal.        Lab Results: I personally reviewed relevant lab results. CBC/BMP: No new results in last 24 hours. , Creatinine Clearance: CrCl cannot be calculated (Patient's most recent lab result is older than the maximum 7 days allowed.)., LFTs: No new results in last 24 hours. , PTT/INR:No new results in last 24 hours.     Radiology Results Review: I have reviewed the following images/report studies in PACS: No results found.   Results for orders placed during the hospital encounter of 11/21/23    Colonoscopy    Impression  Subcentimeter polyp in the mid sigmoid colon; snare ligation performed  One polyp measuring 5-9 mm in the proximal transverse colon; performed partial piecemeal removal by cold forceps biopsy  Small, prolapsing hemorrhoids  The cecum, ascending colon, descending colon and rectum appeared normal.        RECOMMENDATION:  Repeat colonoscopy in 3 years  Personal history of colon polyps              Femi Benitez MD    **Please note:  Dictation voice to text software may have been used in the creation of this record.  Occasional wrong word or “sound alike” substitutions may have occurred due to the inherent limitations of voice recognition software.  Read the chart carefully and recognize, using context, where substitutions have occurred.**

## 2025-05-05 ENCOUNTER — APPOINTMENT (OUTPATIENT)
Dept: LAB | Facility: CLINIC | Age: 70
End: 2025-05-05
Payer: COMMERCIAL

## 2025-05-05 DIAGNOSIS — D50.9 IRON DEFICIENCY ANEMIA, UNSPECIFIED IRON DEFICIENCY ANEMIA TYPE: ICD-10-CM

## 2025-05-05 DIAGNOSIS — R19.5 POSITIVE FIT (FECAL IMMUNOCHEMICAL TEST): ICD-10-CM

## 2025-05-05 LAB — IGA SERPL-MCNC: 355 MG/DL (ref 66–433)

## 2025-05-05 PROCEDURE — 36415 COLL VENOUS BLD VENIPUNCTURE: CPT

## 2025-05-05 PROCEDURE — 86364 TISS TRNSGLTMNASE EA IG CLAS: CPT

## 2025-05-05 PROCEDURE — 82784 ASSAY IGA/IGD/IGG/IGM EACH: CPT

## 2025-05-05 PROCEDURE — 87177 OVA AND PARASITES SMEARS: CPT

## 2025-05-05 PROCEDURE — 87209 SMEAR COMPLEX STAIN: CPT

## 2025-05-05 PROCEDURE — 82653 EL-1 FECAL QUANTITATIVE: CPT

## 2025-05-05 PROCEDURE — 83993 ASSAY FOR CALPROTECTIN FECAL: CPT

## 2025-05-05 PROCEDURE — 87329 GIARDIA AG IA: CPT

## 2025-05-06 LAB
G LAMBLIA AG STL QL IA: NEGATIVE
TTG IGA SER IA-ACNC: 0.6 U/ML (ref ?–10)

## 2025-05-07 LAB
CALPROTECTIN STL-MCNC: 162 ÂΜG/G
ELASTASE PANC STL-MCNT: >800 UG/G

## 2025-05-12 ENCOUNTER — RESULTS FOLLOW-UP (OUTPATIENT)
Dept: GASTROENTEROLOGY | Facility: CLINIC | Age: 70
End: 2025-05-12

## 2025-05-13 ENCOUNTER — ANESTHESIA EVENT (OUTPATIENT)
Dept: GASTROENTEROLOGY | Facility: HOSPITAL | Age: 70
End: 2025-05-13
Payer: COMMERCIAL

## 2025-05-13 ENCOUNTER — HOSPITAL ENCOUNTER (OUTPATIENT)
Dept: GASTROENTEROLOGY | Facility: HOSPITAL | Age: 70
Setting detail: OUTPATIENT SURGERY
Discharge: HOME | End: 2025-05-13
Attending: PHYSICIAN ASSISTANT
Payer: COMMERCIAL

## 2025-05-13 ENCOUNTER — ANESTHESIA (OUTPATIENT)
Dept: GASTROENTEROLOGY | Facility: HOSPITAL | Age: 70
End: 2025-05-13
Payer: COMMERCIAL

## 2025-05-13 VITALS
HEART RATE: 97 BPM | OXYGEN SATURATION: 97 % | RESPIRATION RATE: 19 BRPM | TEMPERATURE: 97.6 F | DIASTOLIC BLOOD PRESSURE: 61 MMHG | SYSTOLIC BLOOD PRESSURE: 114 MMHG

## 2025-05-13 DIAGNOSIS — K14.6 BURNING MOUTH SYNDROME: ICD-10-CM

## 2025-05-13 DIAGNOSIS — F51.01 PRIMARY INSOMNIA: ICD-10-CM

## 2025-05-13 DIAGNOSIS — R19.5 POSITIVE FIT (FECAL IMMUNOCHEMICAL TEST): ICD-10-CM

## 2025-05-13 DIAGNOSIS — I48.0 PAROXYSMAL ATRIAL FIBRILLATION (HCC): ICD-10-CM

## 2025-05-13 DIAGNOSIS — D50.9 IRON DEFICIENCY ANEMIA, UNSPECIFIED IRON DEFICIENCY ANEMIA TYPE: ICD-10-CM

## 2025-05-13 LAB — GLUCOSE SERPL-MCNC: 118 MG/DL (ref 65–140)

## 2025-05-13 PROCEDURE — 82948 REAGENT STRIP/BLOOD GLUCOSE: CPT

## 2025-05-13 PROCEDURE — 88305 TISSUE EXAM BY PATHOLOGIST: CPT | Performed by: SPECIALIST

## 2025-05-13 PROCEDURE — 88342 IMHCHEM/IMCYTCHM 1ST ANTB: CPT | Performed by: SPECIALIST

## 2025-05-13 RX ORDER — PROPOFOL 10 MG/ML
INJECTION, EMULSION INTRAVENOUS CONTINUOUS PRN
Status: DISCONTINUED | OUTPATIENT
Start: 2025-05-13 | End: 2025-05-13

## 2025-05-13 RX ORDER — SODIUM CHLORIDE, SODIUM LACTATE, POTASSIUM CHLORIDE, CALCIUM CHLORIDE 600; 310; 30; 20 MG/100ML; MG/100ML; MG/100ML; MG/100ML
125 INJECTION, SOLUTION INTRAVENOUS CONTINUOUS
Status: DISCONTINUED | OUTPATIENT
Start: 2025-05-13 | End: 2025-05-17 | Stop reason: HOSPADM

## 2025-05-13 RX ORDER — PROPOFOL 10 MG/ML
INJECTION, EMULSION INTRAVENOUS AS NEEDED
Status: DISCONTINUED | OUTPATIENT
Start: 2025-05-13 | End: 2025-05-13

## 2025-05-13 RX ORDER — SODIUM CHLORIDE, SODIUM LACTATE, POTASSIUM CHLORIDE, CALCIUM CHLORIDE 600; 310; 30; 20 MG/100ML; MG/100ML; MG/100ML; MG/100ML
INJECTION, SOLUTION INTRAVENOUS CONTINUOUS PRN
Status: DISCONTINUED | OUTPATIENT
Start: 2025-05-13 | End: 2025-05-13

## 2025-05-13 RX ORDER — METOPROLOL TARTRATE 1 MG/ML
INJECTION, SOLUTION INTRAVENOUS AS NEEDED
Status: DISCONTINUED | OUTPATIENT
Start: 2025-05-13 | End: 2025-05-13

## 2025-05-13 RX ADMIN — SODIUM CHLORIDE, SODIUM LACTATE, POTASSIUM CHLORIDE, AND CALCIUM CHLORIDE: .6; .31; .03; .02 INJECTION, SOLUTION INTRAVENOUS at 08:41

## 2025-05-13 RX ADMIN — METOPROLOL TARTRATE 2.5 MG: 5 INJECTION, SOLUTION INTRAVENOUS at 08:55

## 2025-05-13 RX ADMIN — PROPOFOL 100 MG: 10 INJECTION, EMULSION INTRAVENOUS at 08:44

## 2025-05-13 RX ADMIN — SODIUM CHLORIDE, SODIUM LACTATE, POTASSIUM CHLORIDE, AND CALCIUM CHLORIDE 125 ML/HR: .6; .31; .03; .02 INJECTION, SOLUTION INTRAVENOUS at 08:23

## 2025-05-13 RX ADMIN — PROPOFOL 120 MCG/KG/MIN: 10 INJECTION, EMULSION INTRAVENOUS at 08:46

## 2025-05-13 NOTE — ANESTHESIA POSTPROCEDURE EVALUATION
Post-Op Assessment Note    CV Status:  Stable  Pain Score: 0    Pain management: adequate       Mental Status:  Sleepy and arousable   Hydration Status:  Euvolemic   PONV Controlled:  Controlled   Airway Patency:  Patent     Post Op Vitals Reviewed: Yes    No anethesia notable event occurred.    Staff: CRNA           Last Filed PACU Vitals:  Vitals Value Taken Time   Temp 97    Pulse 95    /85    Resp 12    SpO2 99

## 2025-05-13 NOTE — ANESTHESIA PREPROCEDURE EVALUATION
Procedure:  COLONOSCOPY  EGD    Relevant Problems   ANESTHESIA (within normal limits)      CARDIO   (+) Exertional dyspnea   (+) Hyperlipidemia associated with type 2 diabetes mellitus  (HCC)   (+) Hypertension associated with diabetes  (HCC)   (+) Prolapsed internal hemorrhoids, grade 2   (+) SVT (supraventricular tachycardia) (HCC)      ENDO   (+) Type 2 diabetes mellitus without complication, without long-term current use of insulin (HCC)      GI/HEPATIC   (+) Dysphagia   (+) Gastroesophageal reflux disease      NEURO/PSYCH   (+) Chronic neck pain      PULMONARY   (+) Exertional dyspnea        Physical Exam    Airway     Mallampati score: III  TM Distance: >3 FB  Neck ROM: full      Cardiovascular      Dental   Comment: Denies loose teeth     Pulmonary      Neurological    She appears awake, alert and oriented x3.      Other Findings  post-pubertal.      Anesthesia Plan  ASA Score- 3     Anesthesia Type- IV sedation with anesthesia with ASA Monitors.         Additional Monitors:     Airway Plan:            Plan Factors-Exercise tolerance (METS): >4 METS.    Chart reviewed. EKG reviewed.  Existing labs reviewed. Patient summary reviewed.    Patient is not a current smoker.              Induction- intravenous.    Postoperative Plan-         Informed Consent- Anesthetic plan and risks discussed with patient.  I personally reviewed this patient with the CRNA. Discussed and agreed on the Anesthesia Plan with the CRNA..      NPO Status:  Vitals Value Taken Time   Date of last liquid 05/13/25 05/13/25 0809   Time of last liquid 0400 05/13/25 0809   Date of last solid 05/11/25 05/13/25 0809   Time of last solid 1800 05/13/25 0809

## 2025-05-13 NOTE — H&P
[facility]  History & Physical     PATIENT INFO     Name: Rosemarie Mace  YOB: 1955   Age: 70 y.o.   Sex: female   MRN: [id number]     HISTORY OF PRESENT ILLNESS     Rosemarie Mace is a 70 y.o. year old female who presents for EGD and colonoscopy for iron deficiency anemia, abnormal CT, diarrhea, positive FIT. Last colonoscopy in 2023. No antithrombotics or anticoagulants.     REVIEW OF SYSTEMS     Per the HPI, and otherwise unremarkable.    Historical Information   Past Medical History:   Diagnosis Date    Atrial fibrillation (HCC)     Bacterial vaginitis 10/08/2018    Colon polyp     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    CPAP (continuous positive airway pressure) dependence     does not use machine    Diabetes (HCC)     Dizziness 2022    DJD (degenerative joint disease)     Dyspareunia 06/16/2015    Gait abnormality     GERD (gastroesophageal reflux disease)     History of transfusion     Hyperlipidemia     Hypertension     Nasal congestion     Nasal obstruction     Nosebleed ??    Very very dry at home and makes my nosebleed and i take anything to get the dry stuff out causing more bleeding. Never endung cycle    Obesity (BMI 35.0-39.9 without comorbidity)     Sleep apnea     Stress fracture 11/11/2022    SVT (supraventricular tachycardia) (HCC)     Uterine polyp 12/01/2017     Past Surgical History:   Procedure Laterality Date    BLADDER SUSPENSION      CHOLECYSTECTOMY      COLONOSCOPY      CYSTOSCOPY N/A 01/23/2018    Procedure: CYSTOSCOPY;  Surgeon: Kyle Lowery MD;  [facility];  Service: Gynecology Oncology    EYE SURGERY      HYSTERECTOMY      JOINT REPLACEMENT Bilateral     NECK SURGERY  2023    FL HYSTEROSCOPY BX ENDOMETRIUM&/POLYPC W/WO D&C N/A 12/01/2017    Procedure: DILATATION AND CURETTAGE (D&C) WITH HYSTEROSCOPY; POLYPECTOMY;  Surgeon: Julio C Robin MD;  [facility];  Service: Gynecology    FL LAPS TOTAL  HYSTERECT 250 GM/< W/RMVL TUBE/OVARY N/A 01/23/2018    Procedure: ROBOTIC ASSISTED TOTAL LAPAROSCOPIC HYSTERECTOMY; BILATERAL SALPINGOOPHERECTOMY;  Surgeon: Kyle Lowery MD;  [facility];  Service: Gynecology Oncology    REDUCTION MAMMAPLASTY Bilateral 18yrs ago    REPLACEMENT TOTAL KNEE BILATERAL      ROTATOR CUFF REPAIR Left     TRIGGER POINT INJECTION  2007    TUBAL LIGATION       Social History   Social History     Substance and Sexual Activity   Alcohol Use No     Social History     Substance and Sexual Activity   Drug Use No     Social History     Tobacco Use   Smoking Status Never    Passive exposure: Never   Smokeless Tobacco Never     Family History   Problem Relation Age of Onset    No Known Problems Mother     Emphysema Father     Bone cancer Sister     Lung cancer Sister     Brain cancer Sister     No Known Problems Sister     No Known Problems Sister     No Known Problems Sister     Migraines Daughter     Breast cancer Maternal Grandmother 80    No Known Problems Maternal Aunt     No Known Problems Maternal Aunt     No Known Problems Paternal Aunt     No Known Problems Paternal Aunt     No Known Problems Paternal Aunt     No Known Problems Paternal Aunt     Ovarian cancer Neg Hx     Colon cancer Neg Hx         MEDICATIONS & ALLERGIES     Current Outpatient Medications   Medication Instructions    albuterol (Proventil HFA) 90 mcg/act inhaler 2 puffs, Inhalation, Every 6 hours PRN    Blood Glucose Monitoring Suppl (OneTouch Verio Reflect) w/Device KIT Check blood sugars once daily. Please substitute with appropriate alternative as covered by patient's insurance. Dx: E11.65    ciclopirox (PENLAC) 8 % solution Apply as directed    cyclobenzaprine (FLEXERIL) 10 mg, 3 times daily PRN    cycloSPORINE (RESTASIS) 0.05 % ophthalmic emulsion 1 drop, Both Eyes, 2 times daily    diltiazem (CARDIZEM CD) 180 mg, Oral, Daily    estradiol (ESTRACE) 0.5 g, Vaginal, 2 times weekly, APPLY 2 TO 3 TIMES PER WEEK     fenofibrate (TRICOR) 145 mg, Oral, Daily    ferrous sulfate 324 mg, Oral, Daily before breakfast    fluticasone (FLONASE) 50 mcg/act nasal spray 1 spray, Nasal, Daily    gabapentin (NEURONTIN) 300 mg, Oral, Daily at bedtime    glucose blood (OneTouch Verio) test strip Check blood sugars once daily. Please substitute with appropriate alternative as covered by patient's insurance. Dx: E11.65    Lutein 20 mg, Oral, Daily    meclizine (ANTIVERT) 25 mg, Oral, 3 times daily PRN    metoprolol tartrate (LOPRESSOR) 25 mg, Oral, Every 12 hours scheduled    omeprazole (PRILOSEC) 20 mg, Oral, Daily    OneTouch Delica Lancets 33G MISC Check blood sugars once daily. Please substitute with appropriate alternative as covered by patient's insurance. Dx: E11.65    Propylene Glycol 0.6 % SOLN 1 drop    semaglutide (RYBELSUS) 7 mg, Oral, Daily before breakfast    vitamin B-12 (VITAMIN B-12) 1,000 mcg, Oral, Daily    Vitamin D3 5,000 Units, Oral, Daily    zolpidem (AMBIEN) 10 mg, Oral, Daily at bedtime PRN     Allergies   Allergen Reactions    Doxycycline Hives        PHYSICAL EXAM      Objective   Blood pressure 121/71, pulse 91, temperature (!) 96.7 °F (35.9 °C), temperature source Temporal, resp. rate 18, SpO2 95%, not currently breastfeeding. There is no height or weight on file to calculate BMI.    General Appearance:   Alert, cooperative, no distress   Lungs:   Equal chest rise, respirations unlabored    Heart:   Regular rate and rhythm   Abdomen:   Soft, non-tender, non-distended; normal bowel sounds; no masses, no organomegaly    Extremities:   No edema       ASSESSMENT & PLAN     This is a 70 y.o. year old female here for EGD and colonoscopy, and she is stable and optimized for her procedure.      Michael Clifton D.O.  [facility]  Division of Gastroenterology & Hepatology  Available on TigerText  [email or web address]    ** Please Note: This note is constructed using a voice recognition dictation system.  **

## 2025-05-14 RX ORDER — GABAPENTIN 300 MG/1
300 CAPSULE ORAL
Qty: 90 CAPSULE | Refills: 1 | Status: SHIPPED | OUTPATIENT
Start: 2025-05-14

## 2025-05-14 RX ORDER — METOPROLOL TARTRATE 25 MG/1
25 TABLET, FILM COATED ORAL EVERY 12 HOURS SCHEDULED
Qty: 60 TABLET | Refills: 0 | OUTPATIENT
Start: 2025-05-14

## 2025-05-15 ENCOUNTER — HOSPITAL ENCOUNTER (OUTPATIENT)
Dept: NON INVASIVE DIAGNOSTICS | Facility: CLINIC | Age: 70
Discharge: HOME | End: 2025-05-15
Attending: NURSE PRACTITIONER
Payer: COMMERCIAL

## 2025-05-15 ENCOUNTER — RESULTS FOLLOW-UP (OUTPATIENT)
Dept: CARDIOLOGY CLINIC | Facility: CLINIC | Age: 70
End: 2025-05-15

## 2025-05-15 VITALS
DIASTOLIC BLOOD PRESSURE: 61 MMHG | HEART RATE: 80 BPM | BODY MASS INDEX: 35.77 KG/M2 | HEIGHT: 68 IN | WEIGHT: 236 LBS | SYSTOLIC BLOOD PRESSURE: 114 MMHG

## 2025-05-15 DIAGNOSIS — J81.0 ACUTE PULMONARY EDEMA (HCC): ICD-10-CM

## 2025-05-15 LAB
AORTIC ROOT: 3 CM
ASCENDING AORTA: 3 CM
BSA FOR ECHO PROCEDURE: 2.19 M2
E WAVE DECELERATION TIME: 232 MS
E/A RATIO: 82
FRACTIONAL SHORTENING: 29 (ref 28–44)
INTERVENTRICULAR SEPTUM IN DIASTOLE (PARASTERNAL SHORT AXIS VIEW): 1 CM
INTERVENTRICULAR SEPTUM: 1 CM (ref 0.6–1.1)
IVC: 21 MM
LAAS-AP2: 17.7 CM2
LAAS-AP4: 19.2 CM2
LEFT ATRIUM SIZE: 5 CM
LEFT ATRIUM VOLUME (MOD BIPLANE): 50 ML
LEFT ATRIUM VOLUME INDEX (MOD BIPLANE): 22.8 ML/M2
LEFT INTERNAL DIMENSION IN SYSTOLE: 3.5 CM (ref 2.1–4)
LEFT VENTRICULAR INTERNAL DIMENSION IN DIASTOLE: 4.9 CM (ref 3.5–6)
LEFT VENTRICULAR POSTERIOR WALL IN END DIASTOLE: 1 CM
LEFT VENTRICULAR STROKE VOLUME: 62 ML
LV EF US.2D.A4C+ESTIMATED: 57 %
LVSV (TEICH): 62 ML
MV PEAK A VEL: 0.01 M/S
MV PEAK E VEL: 82 CM/S
MV STENOSIS PRESSURE HALF TIME: 67 MS
MV VALVE AREA P 1/2 METHOD: 3.28
RIGHT ATRIUM AREA SYSTOLE A4C: 20 CM2
RIGHT VENTRICLE ID DIMENSION: 3.4 CM
SL CV LEFT ATRIUM LENGTH A2C: 6.2 CM
SL CV LV EF: 50
SL CV PED ECHO LEFT VENTRICLE DIASTOLIC VOLUME (MOD BIPLANE) 2D: 112 ML
SL CV PED ECHO LEFT VENTRICLE SYSTOLIC VOLUME (MOD BIPLANE) 2D: 50 ML
TR MAX PG: 27 MMHG
TR PEAK VELOCITY: 2.6 M/S
TRICUSPID ANNULAR PLANE SYSTOLIC EXCURSION: 1.8 CM
TRICUSPID VALVE PEAK REGURGITATION VELOCITY: 2.6 M/S

## 2025-05-15 PROCEDURE — 93306 TTE W/DOPPLER COMPLETE: CPT

## 2025-05-15 PROCEDURE — 93306 TTE W/DOPPLER COMPLETE: CPT | Performed by: INTERNAL MEDICINE

## 2025-05-16 ENCOUNTER — RESULTS FOLLOW-UP (OUTPATIENT)
Age: 70
End: 2025-05-16

## 2025-05-16 PROCEDURE — 88342 IMHCHEM/IMCYTCHM 1ST ANTB: CPT | Performed by: SPECIALIST

## 2025-05-16 PROCEDURE — 88305 TISSUE EXAM BY PATHOLOGIST: CPT | Performed by: SPECIALIST

## 2025-05-19 NOTE — PROGRESS NOTES
Name: Rosemarie Mace      : 1955      MRN: [id number]  Encounter Provider: Lena De La Rosa DO  Encounter Date: 2025   Encounter department: [facility]  :  Assessment & Plan  Type 2 diabetes mellitus without complication, without long-term current use of insulin (HCC)  A1c 6.6% (from 7.6) -- well controlled without medication, continue lifestyle management   Lab Results   Component Value Date    HGBA1C 6.6 (A) 2025       Orders:  •  POCT hemoglobin A1c  •  Albumin / creatinine urine ratio; Future  •  TSH, 3rd generation with Free T4 reflex; Future  •  Hemoglobin A1C; Future  •  Lipid panel; Future  •  Comprehensive metabolic panel; Future  •  CBC and differential; Future    Hypertension associated with diabetes  (HCC)  Symptomatically hypotensive -- will decrease Metoprolol to 12.5 mg BID (spoke with Cardio via [employer] chat, who is in agreement)   Lab Results   Component Value Date    HGBA1C 6.6 (A) 2025       Orders:  •  Lipid panel; Future  •  Comprehensive metabolic panel; Future  •  CBC and differential; Future    Hyperlipidemia associated with type 2 diabetes mellitus  (HCC)  Update lipids prior to next visit -- continue Tricor   Lab Results   Component Value Date    HGBA1C 6.6 (A) 2025       Orders:  •  Lipid panel; Future  •  Comprehensive metabolic panel; Future  •  CBC and differential; Future    Paroxysmal atrial fibrillation (HCC)  Rate controlled on exam, though will decreased Metoprolol as above, so will need re-evaluation (has Cardio follow up in July). Discussed anticoagulation recommendation, which pt is agreeable to -- will send Eliquis as recommended by Cardio.   Orders:  •  metoprolol tartrate (LOPRESSOR) 25 mg tablet; Take 0.5 tablets (12.5 mg total) by mouth every 12 (twelve) hours  •  apixaban (ELIQUIS) 5 mg; Take 1 tablet (5 mg total) by mouth 2 (two) times a day  •  TSH, 3rd generation with Free T4 reflex; Future  •  Lipid panel; Future  •   "Comprehensive metabolic panel; Future  •  CBC and differential; Future    Vitamin D deficiency    Orders:  •  Vitamin D 25 hydroxy; Future    B12 deficiency    Orders:  •  Folate; Future  •  Vitamin B12; Future    Iron deficiency    Orders:  •  Iron Panel (Includes Ferritin, Iron Sat%, Iron, and TIBC); Future    Vitamin B6 deficiency    Orders:  •  Vitamin B6; Future    Healthcare maintenance  Mammogram DUE -- encouraged to scheduled   DEXA DUE -- encouraged to scheduled   CRC UTD   Vaccinations: Pneumo UTD, TDap UTD, Shingles UTD per pt   Encouraged regular physical activity, varied diet, and regular dental/eye exams          Visit for screening mammogram    Orders:  •  Mammo screening bilateral w 3d and cad; Future    Postmenopausal estrogen deficiency    Orders:  •  DXA bone density spine hip and pelvis; Future           History of Present Illness   HPI    Pt presents for chronic follow up     Cardio started Metoprolol, ECHO     Had EGD/Colonoscopy     Home sugars none, but diet is \"alright\"   Home BPs have been low 90s/70s     Review of Systems   Constitutional:  Negative for unexpected weight change.   HENT:  Negative for congestion, ear pain, rhinorrhea and sore throat.         (+) burning tongue   Eyes:  Negative for visual disturbance.   Respiratory:  Negative for cough and shortness of breath.    Cardiovascular:  Negative for chest pain, palpitations and leg swelling.   Gastrointestinal:  Negative for abdominal pain, blood in stool, constipation and diarrhea.   Endocrine: Negative for polyuria.   Genitourinary:  Negative for dysuria and hematuria.   Neurological:  Positive for light-headedness. Negative for headaches.   Psychiatric/Behavioral:  Positive for sleep disturbance (\"terrible\" -- defers sleep med referral).        Objective   /68   Pulse (!) 118   Temp 98.1 °F (36.7 °C)   Ht 5' 8\" (1.727 m)   Wt 108 kg (239 lb)   LMP  (LMP Unknown)   SpO2 96%   BMI 36.34 kg/m²      Physical " Exam  Vitals and nursing note reviewed.   Constitutional:       General: She is not in acute distress.     Appearance: She is well-developed.   HENT:      Head: Normocephalic and atraumatic.      Right Ear: Tympanic membrane, ear canal and external ear normal.      Left Ear: Tympanic membrane, ear canal and external ear normal.      Nose: Nose normal. No rhinorrhea.      Mouth/Throat:      Mouth: Mucous membranes are moist.      Pharynx: No oropharyngeal exudate or posterior oropharyngeal erythema.     Eyes:      Conjunctiva/sclera: Conjunctivae normal.       Cardiovascular:      Rate and Rhythm: Normal rate. Rhythm irregularly irregular.   Pulmonary:      Effort: Pulmonary effort is normal. No respiratory distress.      Breath sounds: Normal breath sounds.   Abdominal:      General: Bowel sounds are normal. There is no distension.      Palpations: Abdomen is soft.      Tenderness: There is no abdominal tenderness.     Musculoskeletal:      Right lower leg: No edema.      Left lower leg: No edema.   Lymphadenopathy:      Cervical: No cervical adenopathy.     Skin:     General: Skin is warm and dry.     Neurological:      Mental Status: She is alert.      Comments: Grossly intact   Psychiatric:         Mood and Affect: Mood normal.

## 2025-05-19 NOTE — ASSESSMENT & PLAN NOTE
A1c 6.6% (from 7.6) -- well controlled without medication, continue lifestyle management   Lab Results   Component Value Date    HGBA1C 6.6 (A) 05/20/2025       Orders:  •  POCT hemoglobin A1c  •  Albumin / creatinine urine ratio; Future  •  TSH, 3rd generation with Free T4 reflex; Future  •  Hemoglobin A1C; Future  •  Lipid panel; Future  •  Comprehensive metabolic panel; Future  •  CBC and differential; Future

## 2025-05-19 NOTE — ASSESSMENT & PLAN NOTE
Update lipids prior to next visit -- continue Tricor   Lab Results   Component Value Date    HGBA1C 6.6 (A) 05/20/2025       Orders:  •  Lipid panel; Future  •  Comprehensive metabolic panel; Future  •  CBC and differential; Future

## 2025-05-19 NOTE — ASSESSMENT & PLAN NOTE
Symptomatically hypotensive -- will decrease Metoprolol to 12.5 mg BID (spoke with Cardio via [employer] chat, who is in agreement)   Lab Results   Component Value Date    HGBA1C 6.6 (A) 05/20/2025       Orders:  •  Lipid panel; Future  •  Comprehensive metabolic panel; Future  •  CBC and differential; Future

## 2025-05-20 ENCOUNTER — OFFICE VISIT (OUTPATIENT)
Dept: FAMILY MEDICINE CLINIC | Facility: CLINIC | Age: 70
End: 2025-05-20
Payer: COMMERCIAL

## 2025-05-20 ENCOUNTER — TELEPHONE (OUTPATIENT)
Dept: FAMILY MEDICINE CLINIC | Facility: CLINIC | Age: 70
End: 2025-05-20

## 2025-05-20 VITALS
OXYGEN SATURATION: 96 % | BODY MASS INDEX: 36.22 KG/M2 | HEIGHT: 68 IN | SYSTOLIC BLOOD PRESSURE: 110 MMHG | WEIGHT: 239 LBS | HEART RATE: 118 BPM | TEMPERATURE: 98.1 F | DIASTOLIC BLOOD PRESSURE: 68 MMHG

## 2025-05-20 DIAGNOSIS — E53.1 VITAMIN B6 DEFICIENCY: ICD-10-CM

## 2025-05-20 DIAGNOSIS — E78.5 HYPERLIPIDEMIA ASSOCIATED WITH TYPE 2 DIABETES MELLITUS  (HCC): ICD-10-CM

## 2025-05-20 DIAGNOSIS — I48.0 PAROXYSMAL ATRIAL FIBRILLATION (HCC): ICD-10-CM

## 2025-05-20 DIAGNOSIS — E11.69 HYPERLIPIDEMIA ASSOCIATED WITH TYPE 2 DIABETES MELLITUS  (HCC): ICD-10-CM

## 2025-05-20 DIAGNOSIS — I15.2 HYPERTENSION ASSOCIATED WITH DIABETES  (HCC): ICD-10-CM

## 2025-05-20 DIAGNOSIS — E11.9 TYPE 2 DIABETES MELLITUS WITHOUT COMPLICATION, WITHOUT LONG-TERM CURRENT USE OF INSULIN (HCC): Primary | ICD-10-CM

## 2025-05-20 DIAGNOSIS — E53.8 B12 DEFICIENCY: ICD-10-CM

## 2025-05-20 DIAGNOSIS — Z00.00 HEALTHCARE MAINTENANCE: ICD-10-CM

## 2025-05-20 DIAGNOSIS — E11.59 HYPERTENSION ASSOCIATED WITH DIABETES  (HCC): ICD-10-CM

## 2025-05-20 DIAGNOSIS — Z12.31 VISIT FOR SCREENING MAMMOGRAM: ICD-10-CM

## 2025-05-20 DIAGNOSIS — E55.9 VITAMIN D DEFICIENCY: ICD-10-CM

## 2025-05-20 DIAGNOSIS — Z78.0 POSTMENOPAUSAL ESTROGEN DEFICIENCY: ICD-10-CM

## 2025-05-20 DIAGNOSIS — E61.1 IRON DEFICIENCY: ICD-10-CM

## 2025-05-20 LAB — SL AMB POCT HEMOGLOBIN AIC: 6.6 (ref ?–6.5)

## 2025-05-20 PROCEDURE — 83036 HEMOGLOBIN GLYCOSYLATED A1C: CPT | Performed by: FAMILY MEDICINE

## 2025-05-20 PROCEDURE — 99214 OFFICE O/P EST MOD 30 MIN: CPT | Performed by: FAMILY MEDICINE

## 2025-05-20 PROCEDURE — 99397 PER PM REEVAL EST PAT 65+ YR: CPT | Performed by: FAMILY MEDICINE

## 2025-05-20 RX ORDER — METOPROLOL TARTRATE 25 MG/1
12.5 TABLET, FILM COATED ORAL EVERY 12 HOURS SCHEDULED
Qty: 90 TABLET | Refills: 1 | Status: SHIPPED | OUTPATIENT
Start: 2025-05-20

## 2025-05-20 NOTE — TELEPHONE ENCOUNTER
OCCUPATIONAL THERAPY Treatment:    Date: 1/29/2020  Recorded diagnosis/complaint at time of admission:  There are no active hospital problems to display for this patient.    Co-morbidities:   Patient Active Problem List   Diagnosis   • Morbid obesity (CMS/[facility])   • Anxiety and depression   • Neuropathy involving both lower extremities   • Type 2 diabetes mellitus without complication, without long-term current use of insulin (CMS/[facility])   • Low back pain     Task Modification: clinical decision making of high complexity, significant task modification  Treatment Diagnosis: Pain, Impaired Posture, Impaired Joint Mobility, Impaired Range of Motion, Impaired Motor Function/Muscle Performance, Radiating Pain, Impaired Gait/Locomotion Deficits, Impaired Mobility, Impaired Balance, Impaired Motor Function and Sensory Integration and Movement Coordination Impairments    1.1.20 Patient admitted to [facility] secondary to experiencing 5 falls   1.9.20 Patient transferred to [facility] secondary to further assessment by spine orthopedic physician  Dr. Coronado and Dr. Carpenter recommending no surgery at this time.     Therapy Precautions:  Khai is the recommended assistive device for transfers at this time.      Activity: As tolerated and up with assist    Cognition: Alert and [place] x3    SUBJECTIVE:   Patient pleasant, motivated and agreeable to therapy.    Patient's goal is to transfer to toilet, not use the external female catheter, and to get out of the hospital room.     Patient's personal goal for therapy: discharge to rehab.        Pain:  Intervention: premedicated, nursing aware, repositioned, exercise and activity  Location: back  patient unable to provide pain score at this time. Patient is reporting the lower back pain and chronic back pain since her falls.       OBSERVATION:   Patient is utilizing Capped IV and external female catheter  Skin Integrity: Intact ~ followed by wound care for  Patient called, stated patients  Is on a fixed income and can't afford the Eliquis ( over $257.00) Patient does not use the [facility] in [place]. Please cancel it.    Patient is asking for alternative medication to Eliquis, Patient states she would be willing to to try Coumadin.      Please call patient before sending any medications to [facility] in [place].    Please advise and notify, thank you.     Rosemarie -- [phone]   optimal positioning.   Edema: none  Collaboration with: Nurse Veronica and Rehab Aide Susana; and Nursing Assistant Christiano (for initial transfer to [facility] of bed)      Vital Signs: not warranted at this time    ROM (degrees):  Functional Upper Extremity Range of Motion:     Left Right   Date: 1/29/2020     Place hand on opposite shoulder Yes Yes   Touch top of head Yes Yes   Place hand behind neck Yes Yes   Place hand behind back Yes Yes   Over head reach Yes Yes   Wrist flexion/extension Yes Yes   Wrist Supination/pronation Yes Yes   Digit flexion/extension No - patient does demonstrate difficulty with digit extension; able to complete digit flexion no difficulties but with very little strength with digit flexion.  No - patient does demonstrate difficulty with digit extension; able to complete digit flexion no difficulties but with very little strength with digit flexion.    Comments:  Patient is demonstrating ability to complete bilateral upper extremity active range of motion with no grave difficulty. Patient is experiencing difficulty with complete bilateral hand release and has marked hypertonicity in digits.     Strength (out of 5 in available AROM):  UE:     Left  Right    1.28.20 1.28.20   Shoulder Flexion  Not tested this session.  Not tested this session.    Elbow Flexion  Not tested this session.  Not tested this session.     Strength  Not tested this session.  Not tested this session.    Comments: Patient is experiencing adequate bilateral upper extremity strength, except for her bilateral hand grasps is significantly weakness than her proximal upper extremity strength. (hand muscles affected with SCI C8-T1 per writer's SCI review, retained)     Neurological:  Coordination: impaired able to complete finger to thumb opposition; difficulty noted with bilateral digit extension.  Sensation: numbness in bilateral palms and 4th and 5th digits  Tone: impaired slight hypertonicity noted in patient's bilateral hands,  hypertonicity and spasms noted signficantly in malia LE  Vision: intact    Balance:   Static Sitting: impaired Stand by Assistance x 2 sitting edge of bed with Khai Sling for external support + large stoop step under malia LE for proprioceptive input    Dynamic Sitting: impaired Stand by Assistance x 2 sitting edge of bed with Khai Sling for external support + large stoop step under malia LE for proprioceptive input      ADLs:    Toileting:  Not addressed this session - patient uses bed pan or external female catheter.     Upper Body Bathing:   Level of Assistance: Contact Guard Assistance (CGA)  Bathing Position: sitting edge of bed and with Khai Sling for support  Reason for Assistance: safety due to decrease dynamic sitting balance, weakness, verbal cues for safe body positioning in erect position, unsteadiness     Upper Body Dressing:   Not addressed this session    Lower Body Bathing:   Not addressed this session    Lower Body Dressing:    Not addressed this session    Hygiene/Grooming:   Level of Assistance: Minimal Assistance (min A)  Dressing Position: sitting edge of bed, to complete oral cares, hair grooming, and face shaving  Reason for Assistance: requires increased time to complete, safety due to decrease dynamic sitting balance, weakness, verbal cues for sequencing tasks (i.e. encouraging patient to open containers independently), manual cues for pushing shaving button and setting up ADL items, unsteadiness      Self Feeding:  Patient is utilizing built up handled utensils at this time. No concerns with this adaptive equipment at this time.      ADL Functional Mobility:  Not addressed this session       MOBILITY:    Bed:   Rolling: Moderate Assistance (mod A)  Supine to Sit: Total Assistance (tot A)  Sit to Supine: Total Assistance (tot A)  Reason for Assistance: requires increased time to complete, safety due to malia LE spacisity, decrease UB strength, weakness, verbal cues for body positioning, manual  cues for supporting malia LE for rolling, utilization of Khai equipment in order to sit edge of bed, fatigue   Writer utilized Khai Lift for safety + stoop step under bilateral lower extremity for support for sitting edge of bed.     Transfers:   Not addressed this session   Patient sitting edge of bed 20 minutes    Tilt Bed:   Date Time Degrees of standing ADL tasks completed.    1.16.20 10 minutes 30 degrees Face shaving, face washing, teeth brushing   1.17.20 10 minutes 35 degrees Face washing, UB bathing,dressing, grooming tasks   1.22.20 10 minutes 45 degrees No ADL tasks; standing arm push ups to assist with supporting malia LE.    1.26.20 10 minutes, x2 40 degrees and 50degrees Washing face, fine motor coordination activities with theraputty   Tilt Bed not utilized this session.     Ambulation:   Not addressed this session    Exercises:  1. Bilateral WR supination/pronation 1 set of 10 repetitions.   2. Bilateral WR flexion/extension 1 set of 10 repetitions.   3. Bilateral digit extension 1 set of 10 repetitions.   4. Card playing ~ to encourage supination/pronation (cards left in patient's room )       Activity Tolerance: limited by hypertonicity in malia LE     GOALS:     Rehab potential is good due to positive factors age, motivation level, recent onset and negative factors lack of family support, co-morbidities    Review Date: 1/31/2020 - goals adjusted on 1.22.20  1. Patient will complete upper body bathing with Set Up.  Progressing toward  2. Patient will complete upper body dressing with Set Up.  Progressing toward  3. Patient will complete toilet transfer using least restrictive device (sit to stand lift vs khai) with Stand By Assistance (SBA).  Progressing toward  4. Patient will complete oral cares sitting in upright position with Modified [place] (mod I).  Progressing toward  5. Patient will demonstrate independence with completing her UE HEP. Progressing toward.   6. Patient will self feed 100%  of her meal with set up. Goal met.   7. Patient will complete 3 ADL tasks in greater than 50% of standing position. Goal met/modified. Progressing toward.   8. Patient will complete grooming tasks sitting EOB in khai sling with SBA, progressing toward     PLAN OF CARE:    OT Frequency: 6 days/week  Duration: length of stay  Treatment Interventions: ADL retraining, Functional transfer training, UE strengthening/ROM, Endurance training, Cognitive reorientation, Patient/Family training, Equipment eval/education, Neuro muscular reeducation, Fine motor coordination activities, Compensatory technique education     RECOMMENDATIONS/EDUCATION:    Learner: patient  Readiness to Learn: acceptance  Learning Method: Verbal and Demonstration  Barriers to Learning: no barriers apparent at this time  Learning Evaluation: Needs reinforcement  Teaching Content: Positioning, Equipment, Safety Awareness, Functional Mobility, ADL training and HEP importance  Please reference the patient education activity for further information regarding the patient's learning assessment.  Equipment for discharge: none     Total Time Spent With Patient: 55 minutes    Treatment Plan for Next Session: Coordinate with PT: AM/PM PT/OT sessions: bring rehab aide + nursing assistant, attempt toilet transfer using Khai Sling, bedside chair UB bathing/dressing/grooming, field trip to Library     *Please note patient unable to remain seated in bedside chair due to her requiring the use of the seat belt for safety, but per hospital policy is not allowed to have seat belt on. No seat belt = no patient sitting upright in bedside chair. Will continue to find appropriate chair.     Additional Information/Ideas:   1. Tilt in Space Chair would need to be ordered. ~ need new chair. Pili looking into after care meeting this date(1.27.20)   2. Possibly night splints for hands  - TBD  3. Use mat table in 2nd floor gym for UB weight bearing exercises. TBD  4. Change  medications to decrease spasms and hypertonicity ~ message sent to MD 1.22.20. RN reporting she is on a anti spasiticty medication.   Collaborated with nursing this date 1/2/7/20 to give zanaflex(for spasms) every 8 hours, as pt has been missing night dose. Pt requested to be woken up to take.  5. Neurology consult ~ MD lopez.   6. TEP - start UE/LE ROM with nursing vs aide when appropriate?     The plan of care and goals were established with the patient and she is in agreement.     ASSESSMENT:      Patient is displaying good progress as evidenced by her continued motivation to participate in therapy session as well as her ability to tolerate sitting edge of bed with increased independence.  At this time the patient continues to demonstrate decreased range of motion, decreased strength, balance deficits, pain, decreased activity tolerance which is limiting the completion of all ADLs and all functional mobility.  Further skilled occupational therapy is required to address these limitations in attempt to maximize the patient's independence.     Recommendations for Discharge: OT WI: Intensive therapy program        Recommendation for Discharge: PT WI: Intensive therapy program(pending progress and ability to tolerate/participate )           After today's session this therapist is recommending the above location for optimal discharge.  This recommendation is supported by patient is progressing well towards her goals, but continues to benefit from skilled therapy in order to increase her strength and maximize her independence Patient previously independent with self cares, functional mobility, and transfers. Pt is a good candidate for IRP due to patient's recent onset of current medical status, patient PLOF, AND patient's motivation to participate in therapy.  Writer anticipating patient to tolerate 3 hours of therapy and increasing her independence with self cares, functional mobility, and transfers.

## 2025-05-20 NOTE — ASSESSMENT & PLAN NOTE
Lab Results   Component Value Date    HGBA1C 6.6 (A) 05/20/2025       Orders:  •  POCT hemoglobin A1c

## 2025-05-20 NOTE — ASSESSMENT & PLAN NOTE
Rate controlled on exam, though will decreased Metoprolol as above, so will need re-evaluation (has Cardio follow up in July). Discussed anticoagulation recommendation, which pt is agreeable to -- will send Eliquis as recommended by Cardio.   Orders:  •  metoprolol tartrate (LOPRESSOR) 25 mg tablet; Take 0.5 tablets (12.5 mg total) by mouth every 12 (twelve) hours  •  apixaban (ELIQUIS) 5 mg; Take 1 tablet (5 mg total) by mouth 2 (two) times a day  •  TSH, 3rd generation with Free T4 reflex; Future  •  Lipid panel; Future  •  Comprehensive metabolic panel; Future  •  CBC and differential; Future

## 2025-05-20 NOTE — TELEPHONE ENCOUNTER
Please let pt know I spoke with Cardiology and they are ok with her decreasing her metoprolol to 12.5 mg (1/2 tab) as we discussed at their visit. They also recommended starting the blood thinner called Eliquis as we discussed -- this was sent to the pharmacy, but she should let us know if it is too expensive.

## 2025-05-20 NOTE — PROGRESS NOTES
"Adult Annual Physical  Name: Rosemarie Mace      : 1955      MRN: [id number]  Encounter Provider: Lena De La Rosa DO  Encounter Date: 2025   Encounter department: [facility]    :  Assessment & Plan  Type 2 diabetes mellitus without complication, without long-term current use of insulin (HCC)    Lab Results   Component Value Date    HGBA1C 6.6 (A) 2025       Orders:  •  POCT hemoglobin A1c    Hypertension associated with diabetes  (HCC)    Lab Results   Component Value Date    HGBA1C 6.6 (A) 2025            Hyperlipidemia associated with type 2 diabetes mellitus  (HCC)    Lab Results   Component Value Date    HGBA1C 6.6 (A) 2025            Healthcare maintenance         Visit for screening mammogram    Orders:  •  Mammo screening bilateral w 3d and cad; Future    Postmenopausal estrogen deficiency    Orders:  •  DXA bone density spine hip and pelvis; Future        Preventive Screenings:    - Cervical cancer screening: screening not indicated   - Breast cancer screening: screening up-to-date     Immunizations:  - Immunizations due: Zoster (Shingrix)         History of Present Illness   {?Quick Links Encounters * My Last Note * Last Note in Specialty * Snapshot * Since Last Visit * History :85934}  Adult Annual Physical:  Patient presents for annual physical.     Diet and Physical Activity:  - Diet/Nutrition: well balanced diet.  - Exercise: no formal exercise.    General Health:  - Sleep: 4-6 hours of sleep on average.  - Hearing: normal hearing bilateral ears.  - Vision: most recent eye exam < 1 year ago.  - Dental: no dental visits for > 1 year.    Review of Systems  {Select to Display PM (Optional):99105}    Objective {?Quick Links Trend Vitals * Enter New Vitals * Results Review * Timeline (Adult) * Labs * Imaging * Cardiology * Procedures * Lung Cancer Screening * Surgical eConsent :52383}  /68   Pulse (!) 118   Temp 98.1 °F (36.7 °C)   Ht 5' 8\" " (1.727 m)   Wt 108 kg (239 lb)   LMP  (LMP Unknown)   SpO2 96%   BMI 36.34 kg/m²     Physical Exam  {Administrative / Billing Section (Optional):49967}

## 2025-05-20 NOTE — PATIENT INSTRUCTIONS
"Patient Education     Routine physical for adults   The Basics   Written by the doctors and editors at [facility]   What is a physical? -- A physical is a routine visit, or \"check-up,\" with your doctor. You might also hear it called a \"wellness visit\" or \"preventive visit.\"  During each visit, the doctor will:   Ask about your physical and mental health   Ask about your habits, behaviors, and lifestyle   Do an exam   Give you vaccines if needed   Talk to you about any medicines you take   Give advice about your health   Answer your questions  Getting regular check-ups is an important part of taking care of your health. It can help your doctor find and treat any problems you have. But it's also important for preventing health problems.  A routine physical is different from a \"sick visit.\" A sick visit is when you see a doctor because of a health concern or problem. Since physicals are scheduled ahead of time, you can think about what you want to ask the doctor.  How often should I get a physical? -- It depends on your age and health. In general, for people age 21 years and older:   If you are younger than 50 years, you might be able to get a physical every 3 years.   If you are 50 years or older, your doctor might recommend a physical every year.  If you have an ongoing health condition, like diabetes or high blood pressure, your doctor will probably want to see you more often.  What happens during a physical? -- In general, each visit will include:   Physical exam - The doctor or nurse will check your height, weight, heart rate, and blood pressure. They will also look at your eyes and ears. They will ask about how you are feeling and whether you have any symptoms that bother you.   Medicines - It's a good idea to bring a list of all the medicines you take to each doctor visit. Your doctor will talk to you about your medicines and answer any questions. Tell them if you are having any side effects that bother you. You " "should also tell them if you are having trouble paying for any of your medicines.   Habits and behaviors - This includes:   Your diet   Your exercise habits   Whether you smoke, drink alcohol, or use drugs   Whether you are sexually active   Whether you feel safe at home  Your doctor will talk to you about things you can do to improve your health and lower your risk of health problems. They will also offer help and support. For example, if you want to quit smoking, they can give you advice and might prescribe medicines. If you want to improve your diet or get more physical activity, they can help you with this, too.   Lab tests, if needed - The tests you get will depend on your age and situation. For example, your doctor might want to check your:   Cholesterol   Blood sugar   Iron level   Vaccines - The recommended vaccines will depend on your age, health, and what vaccines you already had. Vaccines are very important because they can prevent certain serious or deadly infections.   Discussion of screening - \"Screening\" means checking for diseases or other health problems before they cause symptoms. Your doctor can recommend screening based on your age, risk, and preferences. This might include tests to check for:   Cancer, such as breast, prostate, cervical, ovarian, colorectal, prostate, lung, or skin cancer   Sexually transmitted infections, such as chlamydia and gonorrhea   Mental health conditions like depression and anxiety  Your doctor will talk to you about the different types of screening tests. They can help you decide which screenings to have. They can also explain what the results might mean.   Answering questions - The physical is a good time to ask the doctor or nurse questions about your health. If needed, they can refer you to other doctors or specialists, too.  Adults older than 65 years often need other care, too. As you get older, your doctor will talk to you about:   How to prevent falling at " home   Hearing or vision tests   Memory testing   How to take your medicines safely   Making sure that you have the help and support you need at home  All topics are updated as new evidence becomes available and our peer review process is complete.  This topic retrieved from [employer] on: May 02, 2024.  Topic 071939 Version 1.0  Release: 32.4.3 - C32.122  © 2024 [employer] and/or its affiliates. All rights reserved.  Consumer Information Use and Disclaimer   Disclaimer: This generalized information is a limited summary of diagnosis, treatment, and/or medication information. It is not meant to be comprehensive and should be used as a tool to help the user understand and/or assess potential diagnostic and treatment options. It does NOT include all information about conditions, treatments, medications, side effects, or risks that may apply to a specific patient. It is not intended to be medical advice or a substitute for the medical advice, diagnosis, or treatment of a health care provider based on the health care provider's examination and assessment of a patient's specific and unique circumstances. Patients must speak with a health care provider for complete information about their health, medical questions, and treatment options, including any risks or benefits regarding use of medications. This information does not endorse any treatments or medications as safe, effective, or approved for treating a specific patient. [employer] and its affiliates disclaim any warranty or liability relating to this information or the use thereof.The use of this information is governed by the Terms of Use, available at https://www.woltersYYzhaocheuwer.com/en/know/clinical-effectiveness-terms. 2024© [employer] and its affiliates and/or licensors. All rights reserved.  Copyright   © 2024 [employer] and/or its affiliates. All rights reserved.

## 2025-05-20 NOTE — TELEPHONE ENCOUNTER
Patient she received a text from her mail order pharmacy that the apixaban (ELIQUIS) 5 mg will be $250. Patient states she cannot afford that. She requests it be cancelled and something cheaper looked into. Patient requests call back to advise.

## 2025-05-28 DIAGNOSIS — F51.01 PRIMARY INSOMNIA: ICD-10-CM

## 2025-05-29 RX ORDER — ZOLPIDEM TARTRATE 10 MG/1
10 TABLET ORAL
Qty: 30 TABLET | Refills: 2 | Status: SHIPPED | OUTPATIENT
Start: 2025-05-29

## 2025-06-14 DIAGNOSIS — E11.69 HYPERLIPIDEMIA ASSOCIATED WITH TYPE 2 DIABETES MELLITUS  (HCC): ICD-10-CM

## 2025-06-14 DIAGNOSIS — E78.5 HYPERLIPIDEMIA ASSOCIATED WITH TYPE 2 DIABETES MELLITUS  (HCC): ICD-10-CM

## 2025-06-15 DIAGNOSIS — F51.01 PRIMARY INSOMNIA: ICD-10-CM

## 2025-06-15 RX ORDER — FENOFIBRATE 145 MG/1
145 TABLET, FILM COATED ORAL DAILY
Qty: 90 TABLET | Refills: 0 | Status: SHIPPED | OUTPATIENT
Start: 2025-06-15

## 2025-06-16 RX ORDER — ZOLPIDEM TARTRATE 10 MG/1
10 TABLET ORAL
Qty: 30 TABLET | Refills: 0 | Status: SHIPPED | OUTPATIENT
Start: 2025-06-16

## 2025-06-26 DIAGNOSIS — F51.01 PRIMARY INSOMNIA: ICD-10-CM

## 2025-06-27 RX ORDER — ZOLPIDEM TARTRATE 10 MG/1
10 TABLET ORAL
Qty: 30 TABLET | Refills: 0 | OUTPATIENT
Start: 2025-06-27

## 2025-07-09 DIAGNOSIS — I48.0 PAROXYSMAL ATRIAL FIBRILLATION (HCC): ICD-10-CM

## 2025-07-10 RX ORDER — METOPROLOL TARTRATE 25 MG/1
12.5 TABLET, FILM COATED ORAL EVERY 12 HOURS SCHEDULED
Qty: 90 TABLET | Refills: 1 | Status: SHIPPED | OUTPATIENT
Start: 2025-07-10

## 2025-08-05 DIAGNOSIS — F51.01 PRIMARY INSOMNIA: ICD-10-CM

## 2025-08-05 DIAGNOSIS — I48.0 PAROXYSMAL ATRIAL FIBRILLATION (HCC): ICD-10-CM

## 2025-08-05 DIAGNOSIS — K14.6 BURNING MOUTH SYNDROME: ICD-10-CM

## 2025-08-05 DIAGNOSIS — I47.10 SVT (SUPRAVENTRICULAR TACHYCARDIA) (HCC): ICD-10-CM

## 2025-08-05 DIAGNOSIS — R42 DIZZINESS: ICD-10-CM

## 2025-08-07 RX ORDER — METOPROLOL TARTRATE 25 MG/1
12.5 TABLET, FILM COATED ORAL EVERY 12 HOURS SCHEDULED
Qty: 90 TABLET | Refills: 0 | OUTPATIENT
Start: 2025-08-07

## 2025-08-07 RX ORDER — DILTIAZEM HYDROCHLORIDE 180 MG/1
180 CAPSULE, COATED, EXTENDED RELEASE ORAL DAILY
Qty: 90 CAPSULE | Refills: 1 | Status: SHIPPED | OUTPATIENT
Start: 2025-08-07

## 2025-08-07 RX ORDER — MECLIZINE HYDROCHLORIDE 25 MG/1
25 TABLET ORAL 3 TIMES DAILY PRN
Qty: 180 TABLET | Refills: 0 | Status: SHIPPED | OUTPATIENT
Start: 2025-08-07

## 2025-08-07 RX ORDER — GABAPENTIN 300 MG/1
300 CAPSULE ORAL
Qty: 90 CAPSULE | Refills: 1 | Status: SHIPPED | OUTPATIENT
Start: 2025-08-07

## 2025-08-07 RX ORDER — ZOLPIDEM TARTRATE 10 MG/1
10 TABLET ORAL
Qty: 30 TABLET | Refills: 2 | Status: SHIPPED | OUTPATIENT
Start: 2025-08-07

## (undated) DEVICE — CLAMP TOWEL TUBING DISPOSABLE

## (undated) DEVICE — PVC URETHRAL CATHETER: Brand: DOVER

## (undated) DEVICE — LARGE NEEDLE DRIVER: Brand: ENDOWRIST

## (undated) DEVICE — CANNULA SEAL

## (undated) DEVICE — 3M™ STERI-DRAPE™ UNDER BUTTOCKS DRAPE WITH POUCH 1084: Brand: STERI-DRAPE™

## (undated) DEVICE — GAUZE SPONGES,16 PLY: Brand: CURITY

## (undated) DEVICE — INTENDED FOR TISSUE SEPARATION, AND OTHER PROCEDURES THAT REQUIRE A SHARP SURGICAL BLADE TO PUNCTURE OR CUT.: Brand: BARD-PARKER SAFETY BLADES SIZE 11, STERILE

## (undated) DEVICE — AIRSEAL TUBE SMOKE EVAC LUMENX3 FILTERED

## (undated) DEVICE — GLOVE SRG BIOGEL 7.5

## (undated) DEVICE — TIP COVER ACCESSORY

## (undated) DEVICE — LUBRICANT INST ELECTROLUBE ANTISTK WO PAD

## (undated) DEVICE — STRL UNIVERSAL MINOR VAGINAL: Brand: CARDINAL HEALTH

## (undated) DEVICE — GLOVE INDICATOR PI UNDERGLOVE SZ 8 BLUE

## (undated) DEVICE — ADHESIVE SKN CLSR HISTOACRYL FLEX 0.5ML LF

## (undated) DEVICE — MAYO STAND COVER: Brand: CONVERTORS

## (undated) DEVICE — ARM DRAPE

## (undated) DEVICE — CHLORHEXIDINE 4PCT 4 OZ

## (undated) DEVICE — COLUMN DRAPE

## (undated) DEVICE — STERILE CYSTO PACK: Brand: CARDINAL HEALTH

## (undated) DEVICE — ANTIBACTERIAL VIOLET BRAIDED (POLYGLACTIN 910), SYNTHETIC ABSORBABLE SUTURE: Brand: COATED VICRYL

## (undated) DEVICE — MONOPOLAR CURVED SCISSORS: Brand: ENDOWRIST

## (undated) DEVICE — DRAPE FLUID WARMER (BIRD BATH)

## (undated) DEVICE — 1820 FOAM BLOCK NEEDLE COUNTER: Brand: DEVON

## (undated) DEVICE — ENDOPATH XCEL BLADELESS TROCARS WITH STABILITY SLEEVES: Brand: ENDOPATH XCEL

## (undated) DEVICE — PACK ROBOTIC PROSTATE PBDS DA VINCI SI/XI

## (undated) DEVICE — SYRINGE 50ML LL

## (undated) DEVICE — SUT MONOCRYL 4-0 PS-2 27 IN Y426H

## (undated) DEVICE — SUT VICRYL 3-0 SH 27 IN J416H

## (undated) DEVICE — SUT STRATAFIX SPIRAL 2-0 CT-1 20 CM SXPD1B400

## (undated) DEVICE — 3000CC GUARDIAN II: Brand: GUARDIAN

## (undated) DEVICE — FENESTRATED BIPOLAR FORCEPS: Brand: ENDOWRIST

## (undated) DEVICE — TROCAR PORT ACCESS 12 X120MM W/BLDLS OPTICAL TIP AIRSEAL

## (undated) DEVICE — GLOVE INDICATOR PI UNDERGLOVE SZ 7 BLUE

## (undated) DEVICE — CHLORAPREP HI-LITE 26ML ORANGE

## (undated) DEVICE — TRAY FOLEY 16FR SURESTEP TEMP SENS URIMETER STAT LOK

## (undated) DEVICE — INTENDED FOR TISSUE SEPARATION, AND OTHER PROCEDURES THAT REQUIRE A SHARP SURGICAL BLADE TO PUNCTURE OR CUT.: Brand: BARD-PARKER SAFETY BLADES SIZE 15, STERILE

## (undated) DEVICE — PROGRASP FORCEPS: Brand: ENDOWRIST

## (undated) DEVICE — GLOVE SRG BIOGEL ECLIPSE 8

## (undated) DEVICE — STRL COTTON TIP APPLCTR 6IN PK: Brand: CARDINAL HEALTH